# Patient Record
Sex: MALE | Race: WHITE | Employment: FULL TIME | URBAN - METROPOLITAN AREA
[De-identification: names, ages, dates, MRNs, and addresses within clinical notes are randomized per-mention and may not be internally consistent; named-entity substitution may affect disease eponyms.]

---

## 2017-11-23 ENCOUNTER — APPOINTMENT (OUTPATIENT)
Dept: GENERAL RADIOLOGY | Age: 67
End: 2017-11-23
Payer: COMMERCIAL

## 2017-11-23 ENCOUNTER — HOSPITAL ENCOUNTER (EMERGENCY)
Age: 67
Discharge: HOME | End: 2017-11-23
Attending: EMERGENCY MEDICINE
Payer: COMMERCIAL

## 2017-11-23 ENCOUNTER — APPOINTMENT (OUTPATIENT)
Dept: CT IMAGING | Age: 67
End: 2017-11-23
Payer: COMMERCIAL

## 2017-11-23 VITALS
RESPIRATION RATE: 18 BRPM | OXYGEN SATURATION: 98 % | SYSTOLIC BLOOD PRESSURE: 117 MMHG | WEIGHT: 175 LBS | BODY MASS INDEX: 25.05 KG/M2 | DIASTOLIC BLOOD PRESSURE: 91 MMHG | HEIGHT: 70 IN | TEMPERATURE: 99.6 F | HEART RATE: 77 BPM

## 2017-11-23 DIAGNOSIS — I26.99 PULMONARY INFARCT (HCC): Primary | ICD-10-CM

## 2017-11-23 LAB
ALBUMIN SERPL-MCNC: 2.9 G/DL (ref 3.9–4.9)
ALP BLD-CCNC: 71 U/L (ref 35–104)
ALT SERPL-CCNC: 63 U/L (ref 0–41)
ANION GAP SERPL CALCULATED.3IONS-SCNC: 17 MEQ/L (ref 7–13)
APTT: 29.2 SEC (ref 21.6–35.4)
AST SERPL-CCNC: 40 U/L (ref 0–40)
BASOPHILS ABSOLUTE: 0.1 K/UL (ref 0–0.2)
BASOPHILS RELATIVE PERCENT: 1.4 %
BILIRUB SERPL-MCNC: 0.3 MG/DL (ref 0–1.2)
BILIRUBIN URINE: NEGATIVE
BLOOD, URINE: ABNORMAL
BUN BLDV-MCNC: 21 MG/DL (ref 8–23)
CALCIUM SERPL-MCNC: 8.9 MG/DL (ref 8.6–10.2)
CHLORIDE BLD-SCNC: 94 MEQ/L (ref 98–107)
CLARITY: ABNORMAL
CO2: 20 MEQ/L (ref 22–29)
COLOR: YELLOW
CREAT SERPL-MCNC: 1 MG/DL (ref 0.7–1.2)
EOSINOPHILS ABSOLUTE: 0.1 K/UL (ref 0–0.7)
EOSINOPHILS RELATIVE PERCENT: 1.4 %
GFR AFRICAN AMERICAN: >60
GFR NON-AFRICAN AMERICAN: >60
GLOBULIN: 3.8 G/DL (ref 2.3–3.5)
GLUCOSE BLD-MCNC: 123 MG/DL (ref 74–109)
GLUCOSE URINE: NEGATIVE MG/DL
HCT VFR BLD CALC: 33.1 % (ref 42–52)
HEMOGLOBIN: 11.2 G/DL (ref 14–18)
INR BLD: 1.2
KETONES, URINE: NEGATIVE MG/DL
LEUKOCYTE ESTERASE, URINE: NEGATIVE
LYMPHOCYTES ABSOLUTE: 1.8 K/UL (ref 1–4.8)
LYMPHOCYTES RELATIVE PERCENT: 25.2 %
MCH RBC QN AUTO: 31 PG (ref 27–31.3)
MCHC RBC AUTO-ENTMCNC: 33.9 % (ref 33–37)
MCV RBC AUTO: 91.4 FL (ref 80–100)
MONOCYTES ABSOLUTE: 0.8 K/UL (ref 0.2–0.8)
MONOCYTES RELATIVE PERCENT: 11.6 %
MUCUS: PRESENT
NEUTROPHILS ABSOLUTE: 4.3 K/UL (ref 1.4–6.5)
NEUTROPHILS RELATIVE PERCENT: 60.4 %
NITRITE, URINE: NEGATIVE
PDW BLD-RTO: 16.6 % (ref 11.5–14.5)
PH UA: 6 (ref 5–9)
PLATELET # BLD: 240 K/UL (ref 130–400)
POTASSIUM SERPL-SCNC: 4.5 MEQ/L (ref 3.5–5.1)
PROTEIN UA: 100 MG/DL
PROTHROMBIN TIME: 12.8 SEC (ref 8.1–13.7)
RBC # BLD: 3.62 M/UL (ref 4.7–6.1)
RBC UA: ABNORMAL /HPF (ref 0–2)
SODIUM BLD-SCNC: 131 MEQ/L (ref 132–144)
SPECIFIC GRAVITY UA: 1.02 (ref 1–1.03)
TOTAL PROTEIN: 6.7 G/DL (ref 6.4–8.1)
TROPONIN: <0.01 NG/ML (ref 0–0.01)
URINE REFLEX TO CULTURE: YES
UROBILINOGEN, URINE: 0.2 E.U./DL
WBC # BLD: 7.1 K/UL (ref 4.8–10.8)
WBC UA: ABNORMAL /HPF (ref 0–5)

## 2017-11-23 PROCEDURE — 80053 COMPREHEN METABOLIC PANEL: CPT

## 2017-11-23 PROCEDURE — 96361 HYDRATE IV INFUSION ADD-ON: CPT

## 2017-11-23 PROCEDURE — 71020 XR CHEST STANDARD TWO VW: CPT

## 2017-11-23 PROCEDURE — 96375 TX/PRO/DX INJ NEW DRUG ADDON: CPT

## 2017-11-23 PROCEDURE — 74176 CT ABD & PELVIS W/O CONTRAST: CPT

## 2017-11-23 PROCEDURE — 84484 ASSAY OF TROPONIN QUANT: CPT

## 2017-11-23 PROCEDURE — 87086 URINE CULTURE/COLONY COUNT: CPT

## 2017-11-23 PROCEDURE — 85610 PROTHROMBIN TIME: CPT

## 2017-11-23 PROCEDURE — 6360000002 HC RX W HCPCS: Performed by: EMERGENCY MEDICINE

## 2017-11-23 PROCEDURE — 2580000003 HC RX 258: Performed by: EMERGENCY MEDICINE

## 2017-11-23 PROCEDURE — 85025 COMPLETE CBC W/AUTO DIFF WBC: CPT

## 2017-11-23 PROCEDURE — 93005 ELECTROCARDIOGRAM TRACING: CPT

## 2017-11-23 PROCEDURE — 36415 COLL VENOUS BLD VENIPUNCTURE: CPT

## 2017-11-23 PROCEDURE — 96374 THER/PROPH/DIAG INJ IV PUSH: CPT

## 2017-11-23 PROCEDURE — 85730 THROMBOPLASTIN TIME PARTIAL: CPT

## 2017-11-23 PROCEDURE — 81001 URINALYSIS AUTO W/SCOPE: CPT

## 2017-11-23 PROCEDURE — 99284 EMERGENCY DEPT VISIT MOD MDM: CPT

## 2017-11-23 PROCEDURE — 87040 BLOOD CULTURE FOR BACTERIA: CPT

## 2017-11-23 RX ORDER — ASCORBIC ACID 500 MG
500 TABLET ORAL DAILY
Status: ON HOLD | COMMUNITY
End: 2018-11-09

## 2017-11-23 RX ORDER — LORAZEPAM 2 MG/ML
2 INJECTION INTRAMUSCULAR ONCE
Status: COMPLETED | OUTPATIENT
Start: 2017-11-23 | End: 2017-11-23

## 2017-11-23 RX ORDER — LEVOFLOXACIN 500 MG/1
500 TABLET, FILM COATED ORAL DAILY
Status: ON HOLD | COMMUNITY
End: 2018-11-09 | Stop reason: HOSPADM

## 2017-11-23 RX ORDER — KETOROLAC TROMETHAMINE 30 MG/ML
30 INJECTION, SOLUTION INTRAMUSCULAR; INTRAVENOUS ONCE
Status: COMPLETED | OUTPATIENT
Start: 2017-11-23 | End: 2017-11-23

## 2017-11-23 RX ORDER — PREDNISONE 1 MG/1
5 TABLET ORAL DAILY
COMMUNITY

## 2017-11-23 RX ORDER — OXYCODONE HYDROCHLORIDE AND ACETAMINOPHEN 5; 325 MG/1; MG/1
1-2 TABLET ORAL EVERY 6 HOURS PRN
Qty: 20 TABLET | Refills: 0 | Status: SHIPPED | OUTPATIENT
Start: 2017-11-23 | End: 2017-11-30

## 2017-11-23 RX ORDER — LANOLIN ALCOHOL/MO/W.PET/CERES
1000 CREAM (GRAM) TOPICAL DAILY
Status: ON HOLD | COMMUNITY
End: 2018-11-09

## 2017-11-23 RX ORDER — 0.9 % SODIUM CHLORIDE 0.9 %
500 INTRAVENOUS SOLUTION INTRAVENOUS ONCE
Status: COMPLETED | OUTPATIENT
Start: 2017-11-23 | End: 2017-11-23

## 2017-11-23 RX ORDER — AZITHROMYCIN 250 MG/1
TABLET, FILM COATED ORAL
Qty: 1 PACKET | Refills: 0 | Status: ON HOLD | OUTPATIENT
Start: 2017-11-23 | End: 2018-11-09

## 2017-11-23 RX ORDER — ALENDRONATE SODIUM 70 MG/1
70 TABLET ORAL
Status: ON HOLD | COMMUNITY
End: 2019-01-11

## 2017-11-23 RX ORDER — ONDANSETRON 2 MG/ML
4 INJECTION INTRAMUSCULAR; INTRAVENOUS ONCE
Status: COMPLETED | OUTPATIENT
Start: 2017-11-23 | End: 2017-11-23

## 2017-11-23 RX ADMIN — SODIUM CHLORIDE 500 ML: 9 INJECTION, SOLUTION INTRAVENOUS at 17:50

## 2017-11-23 RX ADMIN — KETOROLAC TROMETHAMINE 30 MG: 30 INJECTION, SOLUTION INTRAMUSCULAR at 17:51

## 2017-11-23 RX ADMIN — CEFTRIAXONE 1 G: 1 INJECTION, POWDER, FOR SOLUTION INTRAMUSCULAR; INTRAVENOUS at 20:43

## 2017-11-23 RX ADMIN — HYDROMORPHONE HYDROCHLORIDE 0.5 MG: 1 INJECTION, SOLUTION INTRAMUSCULAR; INTRAVENOUS; SUBCUTANEOUS at 20:22

## 2017-11-23 RX ADMIN — ONDANSETRON 4 MG: 2 INJECTION INTRAMUSCULAR; INTRAVENOUS at 20:22

## 2017-11-23 RX ADMIN — LORAZEPAM 2 MG: 2 INJECTION INTRAMUSCULAR at 17:51

## 2017-11-23 ASSESSMENT — ENCOUNTER SYMPTOMS
RHINORRHEA: 0
PHOTOPHOBIA: 0
CONSTIPATION: 0
VOMITING: 0
WHEEZING: 0
CHOKING: 0
ANAL BLEEDING: 0
ABDOMINAL PAIN: 0
SORE THROAT: 0
DIARRHEA: 0
ABDOMINAL DISTENTION: 0
EYE ITCHING: 0
CHEST TIGHTNESS: 0
COLOR CHANGE: 0
TROUBLE SWALLOWING: 0
VOICE CHANGE: 0
BACK PAIN: 0
SINUS PRESSURE: 0
EYE PAIN: 0
EYE REDNESS: 0
EYE DISCHARGE: 0
BLOOD IN STOOL: 0
COUGH: 0
NAUSEA: 0
SHORTNESS OF BREATH: 1
FACIAL SWELLING: 0
STRIDOR: 0

## 2017-11-23 ASSESSMENT — PAIN DESCRIPTION - PAIN TYPE: TYPE: ACUTE PAIN

## 2017-11-23 ASSESSMENT — PAIN SCALES - GENERAL
PAINLEVEL_OUTOF10: 7
PAINLEVEL_OUTOF10: 7
PAINLEVEL_OUTOF10: 4
PAINLEVEL_OUTOF10: 3

## 2017-11-23 ASSESSMENT — PAIN DESCRIPTION - FREQUENCY: FREQUENCY: CONTINUOUS

## 2017-11-23 ASSESSMENT — PAIN DESCRIPTION - ORIENTATION: ORIENTATION: RIGHT

## 2017-11-23 ASSESSMENT — PAIN DESCRIPTION - DESCRIPTORS: DESCRIPTORS: SHARP

## 2017-11-23 ASSESSMENT — PAIN DESCRIPTION - PROGRESSION: CLINICAL_PROGRESSION: GRADUALLY WORSENING

## 2017-11-23 ASSESSMENT — PAIN DESCRIPTION - ONSET: ONSET: SUDDEN

## 2017-11-23 ASSESSMENT — PAIN DESCRIPTION - LOCATION: LOCATION: BACK

## 2017-11-23 NOTE — ED TRIAGE NOTES
Pt to ER with c/o pain in back 7/10, pt states he was in [facility] and was released on Monday and was diagnosed with 2 PE's and an unknown infection, pt states that the pain is getting worse, pt is a&ox3, resp even and deep, pt gasping and grimacing when he breathes

## 2017-11-23 NOTE — ED PROVIDER NOTES
[address] [facility] ED  eMERGENCY dEPARTMENT eNCOUnter      Pt Name: Alexandrea Granados  MRN: [id number]  [place] 1950  Date of evaluation: 11/23/2017  Provider: Lior Lara MD    CHIEF COMPLAINT       Chief Complaint   Patient presents with    Back Pain     diagnosed with 2 PE's on monday, is on eliquis says pain and sob getting worse today         HISTORY OF PRESENT ILLNESS   (Location/Symptom, Timing/Onset, Context/Setting, Quality, Duration, Modifying Factors, Severity)  Note limiting factors. Alexandrea Granados is a 74 y.o. male who presents to the emergency department With right flank and posterior back pain. He states this started a week ago he went to the [facility] clinic was diagnosed with a pulmonary embolism. He was admitted for 3 days, discharged and felt relatively well until 5 or 6 hours ago when the pain returned. He describes the pain as sharp and unrelenting it is not spasmodic in nature and does not radiate he points to an area in his right flank posteriorly between the ninth and 12th ribs. HPI    Nursing Notes were reviewed. REVIEW OF SYSTEMS    (2-9 systems for level 4, 10 or more for level 5)     Review of Systems   Constitutional: Negative for appetite change, chills, diaphoresis, fatigue and fever. HENT: Negative for congestion, dental problem, ear discharge, ear pain, facial swelling, hearing loss, mouth sores, nosebleeds, postnasal drip, rhinorrhea, sinus pressure, sneezing, sore throat, tinnitus, trouble swallowing and voice change. Eyes: Negative for photophobia, pain, discharge, redness, itching and visual disturbance. Respiratory: Positive for shortness of breath. Negative for cough, choking, chest tightness, wheezing and stridor. Cardiovascular: Negative for chest pain, palpitations and leg swelling. Gastrointestinal: Negative for abdominal distention, abdominal pain, anal bleeding, blood in stool, constipation, diarrhea, nausea and vomiting. Endocrine: Negative for cold intolerance, heat intolerance, polydipsia and polyphagia. Genitourinary: Positive for flank pain. Negative for decreased urine volume, difficulty urinating, dysuria, enuresis, frequency, genital sores, hematuria and urgency. Musculoskeletal: Negative for arthralgias, back pain, gait problem, joint swelling, myalgias, neck pain and neck stiffness. Skin: Negative for color change, pallor, rash and wound. Allergic/Immunologic: Negative for environmental allergies and food allergies. Neurological: Negative for dizziness, tremors, seizures, syncope, facial asymmetry, speech difficulty, weakness, light-headedness, numbness and headaches. Hematological: Negative for adenopathy. Does not bruise/bleed easily. Psychiatric/Behavioral: Negative for agitation, behavioral problems, confusion, decreased concentration, dysphoric mood, hallucinations, self-injury, sleep disturbance and suicidal ideas. The patient is not nervous/anxious and is not hyperactive. Except as noted above the remainder of the review of systems was reviewed and negative.        PAST MEDICAL HISTORY     Past Medical History:   Diagnosis Date    Depression 2007    Hypertension     RA (rheumatoid arthritis) ([facility], [place] [address]     [facility] Dr. Harsha Logan arthritis(714.0)          SURGICAL HISTORY       Past Surgical History:   Procedure Laterality Date    FRACTURE SURGERY  2004    OPEN REDUCTION LEFT LEG         CURRENT MEDICATIONS       Discharge Medication List as of 11/23/2017  8:36 PM      CONTINUE these medications which have NOT CHANGED    Details   apixaban (ELIQUIS) 5 MG TABS tablet Take 10 mg by mouth 2 times dailyHistorical Med      LACTOBACILLUS RHAMNOSUS, GG, PO Take 1 capsule by mouth dailyHistorical Med      levofloxacin (LEVAQUIN) 500 MG tablet Take 500 mg by mouth dailyHistorical Med      metoprolol tartrate (LOPRESSOR) 25 MG tablet Take 12.5 mg by mouth 2 times dailyHistorical Med      alendronate well-nourished. No distress. HENT:   Head: Normocephalic and atraumatic. Right Ear: External ear normal.   Left Ear: External ear normal.   Nose: Nose normal.   Mouth/Throat: Oropharynx is clear and moist. No oropharyngeal exudate. Eyes: Conjunctivae and EOM are normal. Pupils are equal, round, and reactive to light. Right eye exhibits no discharge. Left eye exhibits no discharge. No scleral icterus. Neck: Normal range of motion. Neck supple. No JVD present. No tracheal deviation present. No thyromegaly present. Cardiovascular: Normal rate, regular rhythm, normal heart sounds and intact distal pulses. Exam reveals no gallop and no friction rub. No murmur heard. Pulmonary/Chest: Effort normal and breath sounds normal. No stridor. No respiratory distress. He has no wheezes. He has no rales. He exhibits no tenderness. Abdominal: Soft. Bowel sounds are normal. He exhibits no distension and no mass. There is no tenderness. There is no rebound and no guarding. Genitourinary:   Genitourinary Comments: Tender right flank to palpation and percussion   Musculoskeletal: Normal range of motion. He exhibits no edema or tenderness. Lymphadenopathy:     He has no cervical adenopathy. Neurological: He is alert and oriented to person, place, and time. He has normal reflexes. No cranial nerve deficit. He exhibits normal muscle tone. Coordination normal.   Skin: Skin is warm and dry. No rash noted. He is not diaphoretic. No erythema. No pallor. Psychiatric: He has a normal mood and affect. His behavior is normal. Judgment and thought content normal.   Nursing note and vitals reviewed. DIAGNOSTIC RESULTS     EKG: All EKG's are interpreted by the Emergency Department Physician who either signs or Co-signs this chart in the absence of a cardiologist.    A 12-lead EKG was performed which shows sinus tachycardia with a rate of 109 bpm there is a right axis deviation.   No ST segment elevation or depression in any limb later precordial lead. There is no ectopy. Summary: Borderline EKG without acute findings of a STEMI MI    RADIOLOGY:   Non-plain film images such as CT, Ultrasound and MRI are read by the radiologist. Plain radiographic images are visualized and preliminarily interpreted by the emergency physician with the below findings:    CT scan of the abdomen was ordered    Interpretation per the Radiologist below, if available at the time of this note:    RADIOLOGY REPORT   Final Result      XR CHEST STANDARD (2 VW)   Final Result      MILD INFILTRATES OF THE LOWER LOBES, LIKELY INFARCTS RELATED TO THE PATIENT'S KNOWN PULMONARY EMBOLI. CT ABDOMEN PELVIS WO IV CONTRAST Additional Contrast? None   Final Result   1. NORMAL SIZE KIDNEYS WITHOUT NEPHROLITHIASIS OR HYDRONEPHROSIS. 2. THERE IS NO UROLITHIASIS OR CT EVIDENCE OF OBSTRUCTIVE UROPATHY. 3. APPENDIX NOT CLEARLY VISUALIZED HOWEVER, NO CT FINDINGS SUGGESTING APPENDICITIS. 4. MILD COLONIC DIVERTICULOSIS WITHOUT CT EVIDENCE OF DIVERTICULITIS. 5. NO MASS, \"FREE FLUID\", ABSCESS OR PNEUMOPERITONEUM IN THE ABDOMEN. 6. BIBASILAR PULMONARY INFILTRATES OR PNEUMONIA WITH LARGEST PULMONARY INFILTRATE IN RLL. All CT scans at this facility use dose modulation, iterative reconstruction, and/or weight based dosing when appropriate to reduce radiation dose to as low as reasonably achievable.             ED BEDSIDE ULTRASOUND:   Performed by ED Physician - none    LABS:  Labs Reviewed   CBC WITH AUTO DIFFERENTIAL - Abnormal; Notable for the following:        Result Value    RBC 3.62 (*)     Hemoglobin 11.2 (*)     Hematocrit 33.1 (*)     RDW 16.6 (*)     All other components within normal limits   COMPREHENSIVE METABOLIC PANEL - Abnormal; Notable for the following:     Sodium 131 (*)     Chloride 94 (*)     CO2 20 (*)     Anion Gap 17 (*)     Glucose 123 (*)     Alb 2.9 (*)     ALT 63 (*)     Globulin 3.8 (*)     All other components within normal limits   URINE RT REFLEX TO CULTURE - Abnormal; Notable for the following:     Clarity, UA CLOUDY (*)     Blood, Urine MODERATE (*)     Protein,  (*)     All other components within normal limits   MICROSCOPIC URINALYSIS - Abnormal; Notable for the following:     RBC, UA 3-5 (*)     All other components within normal limits   CULTURE BLOOD #1    Narrative:     ORDER#: [id number]                          ORDERED BY: DAVIS CORLEY  SOURCE: Blood                              COLLECTED:  11/23/17 21:12  ANTIBIOTICS AT TRISH.:                      RECEIVED :  11/23/17 21:12   URINE CULTURE    Narrative:     ORDER#: [id number]                          ORDERED BY: ARSENIO BAIRD  SOURCE: Urine Clean Catch                  COLLECTED:  11/23/17 18:00  ANTIBIOTICS AT TRISH.:                      RECEIVED :  11/23/17 20:15   TROPONIN   PROTIME-INR   APTT       All other labs were within normal range or not returned as of this dictation. EMERGENCY DEPARTMENT COURSE and DIFFERENTIAL DIAGNOSIS/MDM:   Vitals:    Vitals:    11/23/17 1740 11/23/17 1810 11/23/17 1905 11/23/17 2026   BP:  129/79 115/78 (!) 117/91   Pulse:  94 86 77   Resp: 22 20 18 18   Temp:       TempSrc:       SpO2:  96% 94% 98%   Weight:       Height:           The patient will be DISCHARGED. MDM      CRITICAL CARE TIME     CONSULTS:  None    PROCEDURES:  Unless otherwise noted below, none     Procedures    FINAL IMPRESSION      1. Pulmonary infarct [facility])          DISPOSITION/PLAN   DISPOSITION Decision to Discharge    PATIENT REFERRED TO:  return or primary doctor 2-4 days            DISCHARGE MEDICATIONS:  Discharge Medication List as of 11/23/2017  8:36 PM      START taking these medications    Details   oxyCODONE-acetaminophen (PERCOCET) 5-325 MG per tablet Take 1-2 tablets by mouth every 6 hours as needed for Pain  WARNING:  May cause drowsiness. May impair ability to operate vehicles or machinery. Do not use in combination with alcohol. ., Disp-20 tablet, R-0Print      azithromycin (ZITHROMAX) 250 MG tablet Take 2 tablets (500 mg) on Day 1, followed by 1 tablet (250 mg) once daily on Days 2 through 5., Disp-1 packet, R-0Print                (Please note that portions of this note were completed with a voice recognition program.  Efforts were made to edit the dictations but occasionally words are mis-transcribed.)    Elian Gleason MD (electronically signed)  Attending Emergency Physician            Elian Gleason MD  12/09/17 5298

## 2017-11-24 NOTE — ED PROVIDER NOTES
[address] [facility] ED  eMERGENCY dEPARTMENT eNCOUnter      Pt Name: Ricky Powell  MRN: [id number]  [place] 1950  Date of evaluation: 11/23/2017  Provider: Charly Torre MD    CHIEF COMPLAINT       Chief Complaint   Patient presents with    Back Pain     diagnosed with 2 PE's on monday, is on eliquis says pain and sob getting worse today         HISTORY OF PRESENT ILLNESS   (Location/Symptom, Timing/Onset, Context/Setting, Quality, Duration, Modifying Factors, Severity)  Note limiting factors. Ricky Powell is a 74 y.o. male who presents to the emergency department Who was first seen by Dr. Tori Schwartz, please see note by him. He was diagnosed with 2 pulmonary embolisms at the clinic clinic and sent home on [facility].  He had pain in the right-sided flank pain at that point, was not diagnosed with kidney stones. There was shortness of breath at that time which has resolved. Tonight, he got suddenly worse in the right flank region. Again no history dysuria frequency or fever. Shortness of breath and anterior chest pain are not present. Productive cough and hemoptysis or not present. Pain is somewhat better now    HPI    Nursing Notes were reviewed. REVIEW OF SYSTEMS    (2-9 systems for level 4, 10 or more for level 5)     Review of Systems  Constitutional symptoms:  no Fatigue, no fever, no chills. Skin symptoms:  Negative except as documented in HPI. ENMT symptoms:  Negative except as documented in HPI. Respiratory symptoms:  Negative except as documented in HPI. Right-sided flank pain as above  Cardiovascular symptoms:  Negative except as documented in HPI. Gastrointestinal symptoms: Negative except for documented as above in the HPI   Genitourinary symptoms:  Negative except as documented in HPI. Musculoskeletal symptoms:  Negative except as documented in HPI. Neurologic symptoms:  Negative except as documented in HPI.    Remainder of 10 systems, all negative except for -Good turgor warm and dry. -Apparent lesions or rashes: No    NEURO: -Patient: alert                -Oriented to: person, place and time. -Appearance and judgment: appropriate.                -Cranial Nerves: Normal.                -Speech: Normal                -Finger-to-Nose testing: Normal                - and plantar flexion: Equal      DIAGNOSTIC RESULTS     EKG: All EKG's are interpreted by the Emergency Department Physician who either signs or Co-signs this chart in the absence of a cardiologist.    EKG was revewed  and revealed normal sinus rhythm, rate is 70-85. no acute ST elevations,no flipped T waves , no Q waves. DC interval is normal.QRS duration is normal. Axes are normal. There are no PVCs    RADIOLOGY:   Non-plain film images such as CT, Ultrasound and MRI are read by the radiologist. Plain radiographic images are visualized and preliminarily interpreted by the emergency physician with the below findings:    Chest x-ray reveals very mild oppose cities in the bases. CT scan of the abdomen pelvis fails to reveal any pathology but does show possible acute pulmonary infarct on top of a pulmonary embolism already present. Unclear that this could also represent an infection patient had been on antibiotics until 2 days ago. He is not having hemoptysis productive cough fever or malaise. He was advised to be admitted to the hospital for observation because pulmonary embolism and superimposed pulmonary infarct can suddenly get worse even cause an effusion. Also sudden death was a possibility mentioned to him and his wife, however he wished to go home enjoy the rest of us thinks having dinner with pain medications and antibiotics she will return for worsening also close family doctor follow-up he will not fail to take his Eliquis.     Interpretation per the Radiologist below, if available at the time of this note:    CT ABDOMEN PELVIS WO IV CONTRAST Additional Contrast? None (Results Pending)   XR CHEST STANDARD (2 VW)    (Results Pending)         ED BEDSIDE ULTRASOUND:   Performed by ED Physician - none    LABS:  Labs Reviewed   CBC WITH AUTO DIFFERENTIAL - Abnormal; Notable for the following:        Result Value    RBC 3.62 (*)     Hemoglobin 11.2 (*)     Hematocrit 33.1 (*)     RDW 16.6 (*)     All other components within normal limits   COMPREHENSIVE METABOLIC PANEL - Abnormal; Notable for the following:     Sodium 131 (*)     Chloride 94 (*)     CO2 20 (*)     Anion Gap 17 (*)     Glucose 123 (*)     Alb 2.9 (*)     ALT 63 (*)     Globulin 3.8 (*)     All other components within normal limits   URINE RT REFLEX TO CULTURE - Abnormal; Notable for the following:     Clarity, UA CLOUDY (*)     Blood, Urine MODERATE (*)     Protein,  (*)     All other components within normal limits   CULTURE BLOOD #1   URINE CULTURE   TROPONIN   PROTIME-INR   APTT   MICROSCOPIC URINALYSIS       All other labs were within normal range or not returned as of this dictation. EMERGENCY DEPARTMENT COURSE and DIFFERENTIAL DIAGNOSIS/MDM:   Vitals:    Vitals:    11/23/17 1702 11/23/17 1740 11/23/17 1810 11/23/17 1905   BP: 133/78  129/79 115/78   Pulse: 123  94 86   Resp:  22 20 18   Temp: 99.6 °F (37.6 °C)      TempSrc: Oral      SpO2: 96%  96% 94%   Weight: 175 lb (79.4 kg)      Height: 5' 10\" (1.778 m)              MDM    CRITICAL CARE TIME   Total Critical Care time was  minutes, excluding separately reportable procedures. There was a high probability of clinically significant/life threatening deterioration in the patient's condition which required my urgent intervention. CONSULTS:  None    PROCEDURES:  Unless otherwise noted below, none     Procedures    FINAL IMPRESSION      1.  Pulmonary infarct [facility])          DISPOSITION/PLAN   DISPOSITION Decision to Discharge    PATIENT REFERRED TO:  return or primary doctor 2-4 days            DISCHARGE MEDICATIONS:  New Prescriptions    AZITHROMYCIN (ZITHROMAX) 250 MG TABLET    Take 2 tablets (500 mg) on Day 1, followed by 1 tablet (250 mg) once daily on Days 2 through 5. OXYCODONE-ACETAMINOPHEN (PERCOCET) 5-325 MG PER TABLET    Take 1-2 tablets by mouth every 6 hours as needed for Pain  WARNING:  May cause drowsiness. May impair ability to operate vehicles or machinery. Do not use in combination with alcohol. .          (Please note that portions of this note were completed with a voice recognition program.  Efforts were made to edit the dictations but occasionally words are mis-transcribed.)    Jeniffer Perez MD (electronically signed)  Attending Emergency Physician          Jeniffer Perez MD  11/23/17 2014

## 2017-11-25 LAB — URINE CULTURE, ROUTINE: NORMAL

## 2017-11-27 LAB
EKG ATRIAL RATE: 109 BPM
EKG P AXIS: 54 DEGREES
EKG P-R INTERVAL: 144 MS
EKG Q-T INTERVAL: 310 MS
EKG QRS DURATION: 82 MS
EKG QTC CALCULATION (BAZETT): 417 MS
EKG R AXIS: 105 DEGREES
EKG T AXIS: 59 DEGREES
EKG VENTRICULAR RATE: 109 BPM

## 2017-11-28 LAB — BLOOD CULTURE, ROUTINE: NORMAL

## 2018-11-09 ENCOUNTER — HOSPITAL ENCOUNTER (INPATIENT)
Age: 68
LOS: 2 days | Discharge: HOME | DRG: 378 | End: 2018-11-11
Attending: INTERNAL MEDICINE | Admitting: INTERNAL MEDICINE
Payer: COMMERCIAL

## 2018-11-09 ENCOUNTER — APPOINTMENT (OUTPATIENT)
Dept: GENERAL RADIOLOGY | Age: 68
DRG: 378 | End: 2018-11-09
Payer: COMMERCIAL

## 2018-11-09 DIAGNOSIS — R77.8 ELEVATED TROPONIN: ICD-10-CM

## 2018-11-09 DIAGNOSIS — K92.2 GASTROINTESTINAL HEMORRHAGE, UNSPECIFIED GASTROINTESTINAL HEMORRHAGE TYPE: Primary | ICD-10-CM

## 2018-11-09 PROBLEM — D62 ACUTE BLOOD LOSS ANEMIA: Status: ACTIVE | Noted: 2018-11-09

## 2018-11-09 LAB
ABO/RH: NORMAL
ALBUMIN SERPL-MCNC: 3.4 G/DL (ref 3.9–4.9)
ALP BLD-CCNC: 38 U/L (ref 35–104)
ALT SERPL-CCNC: 27 U/L (ref 0–41)
ANION GAP SERPL CALCULATED.3IONS-SCNC: 10 MEQ/L (ref 7–13)
ANTIBODY SCREEN: NORMAL
APTT: 21.9 SEC (ref 21.6–35.4)
AST SERPL-CCNC: 30 U/L (ref 0–40)
BASOPHILS ABSOLUTE: 0 K/UL (ref 0–0.2)
BASOPHILS RELATIVE PERCENT: 0.4 %
BILIRUB SERPL-MCNC: 0.5 MG/DL (ref 0–1.2)
BLOOD BANK DISPENSE STATUS: NORMAL
BLOOD BANK DISPENSE STATUS: NORMAL
BLOOD BANK PRODUCT CODE: NORMAL
BLOOD BANK PRODUCT CODE: NORMAL
BPU ID: NORMAL
BPU ID: NORMAL
BUN BLDV-MCNC: 28 MG/DL (ref 8–23)
CALCIUM SERPL-MCNC: 8.9 MG/DL (ref 8.6–10.2)
CHLORIDE BLD-SCNC: 103 MEQ/L (ref 98–107)
CO2: 25 MEQ/L (ref 22–29)
CREAT SERPL-MCNC: 0.77 MG/DL (ref 0.7–1.2)
DESCRIPTION BLOOD BANK: NORMAL
DESCRIPTION BLOOD BANK: NORMAL
EOSINOPHILS ABSOLUTE: 0.1 K/UL (ref 0–0.7)
EOSINOPHILS RELATIVE PERCENT: 0.6 %
FERRITIN: 87.3 NG/ML (ref 30–400)
GFR AFRICAN AMERICAN: >60
GFR NON-AFRICAN AMERICAN: >60
GLOBULIN: 2 G/DL (ref 2.3–3.5)
GLUCOSE BLD-MCNC: 94 MG/DL (ref 74–109)
HCT VFR BLD CALC: 18.9 % (ref 42–52)
HCT VFR BLD CALC: 22.9 % (ref 42–52)
HEMOGLOBIN: 6.4 G/DL (ref 14–18)
HEMOGLOBIN: 7.8 G/DL (ref 14–18)
INR BLD: 1.2
IRON SATURATION: 25 % (ref 11–46)
IRON: 66 UG/DL (ref 59–158)
LYMPHOCYTES ABSOLUTE: 1 K/UL (ref 1–4.8)
LYMPHOCYTES RELATIVE PERCENT: 8 %
MAGNESIUM: 1.8 MG/DL (ref 1.7–2.3)
MCH RBC QN AUTO: 33.6 PG (ref 27–31.3)
MCHC RBC AUTO-ENTMCNC: 33.8 % (ref 33–37)
MCV RBC AUTO: 99.2 FL (ref 80–100)
MONOCYTES ABSOLUTE: 0.8 K/UL (ref 0.2–0.8)
MONOCYTES RELATIVE PERCENT: 7 %
NEUTROPHILS ABSOLUTE: 10.2 K/UL (ref 1.4–6.5)
NEUTROPHILS RELATIVE PERCENT: 84 %
PDW BLD-RTO: 16.8 % (ref 11.5–14.5)
PLATELET # BLD: 263 K/UL (ref 130–400)
POTASSIUM SERPL-SCNC: 5 MEQ/L (ref 3.5–5.1)
PRO-BNP: 747 PG/ML
PROTHROMBIN TIME: 12.1 SEC (ref 9.6–12.3)
RBC # BLD: 1.91 M/UL (ref 4.7–6.1)
SODIUM BLD-SCNC: 138 MEQ/L (ref 132–144)
TOTAL CK: 93 U/L (ref 0–190)
TOTAL IRON BINDING CAPACITY: 268 UG/DL (ref 178–450)
TOTAL PROTEIN: 5.4 G/DL (ref 6.4–8.1)
TROPONIN: 0.01 NG/ML (ref 0–0.01)
TROPONIN: <0.01 NG/ML (ref 0–0.01)
TROPONIN: <0.01 NG/ML (ref 0–0.01)
WBC # BLD: 12.2 K/UL (ref 4.8–10.8)

## 2018-11-09 PROCEDURE — 82728 ASSAY OF FERRITIN: CPT

## 2018-11-09 PROCEDURE — 71045 X-RAY EXAM CHEST 1 VIEW: CPT

## 2018-11-09 PROCEDURE — 2580000003 HC RX 258: Performed by: NURSE PRACTITIONER

## 2018-11-09 PROCEDURE — APPNB45 APP NON BILLABLE 31-45 MINUTES: Performed by: NURSE PRACTITIONER

## 2018-11-09 PROCEDURE — 82550 ASSAY OF CK (CPK): CPT

## 2018-11-09 PROCEDURE — C9113 INJ PANTOPRAZOLE SODIUM, VIA: HCPCS | Performed by: PHYSICIAN ASSISTANT

## 2018-11-09 PROCEDURE — 85730 THROMBOPLASTIN TIME PARTIAL: CPT

## 2018-11-09 PROCEDURE — 83735 ASSAY OF MAGNESIUM: CPT

## 2018-11-09 PROCEDURE — 36415 COLL VENOUS BLD VENIPUNCTURE: CPT

## 2018-11-09 PROCEDURE — P9016 RBC LEUKOCYTES REDUCED: HCPCS

## 2018-11-09 PROCEDURE — 85018 HEMOGLOBIN: CPT

## 2018-11-09 PROCEDURE — 83540 ASSAY OF IRON: CPT

## 2018-11-09 PROCEDURE — 85610 PROTHROMBIN TIME: CPT

## 2018-11-09 PROCEDURE — 86923 COMPATIBILITY TEST ELECTRIC: CPT

## 2018-11-09 PROCEDURE — 2060000000 HC ICU INTERMEDIATE R&B

## 2018-11-09 PROCEDURE — C9113 INJ PANTOPRAZOLE SODIUM, VIA: HCPCS | Performed by: NURSE PRACTITIONER

## 2018-11-09 PROCEDURE — 99285 EMERGENCY DEPT VISIT HI MDM: CPT

## 2018-11-09 PROCEDURE — 84484 ASSAY OF TROPONIN QUANT: CPT

## 2018-11-09 PROCEDURE — 83550 IRON BINDING TEST: CPT

## 2018-11-09 PROCEDURE — 86850 RBC ANTIBODY SCREEN: CPT

## 2018-11-09 PROCEDURE — 80053 COMPREHEN METABOLIC PANEL: CPT

## 2018-11-09 PROCEDURE — 86900 BLOOD TYPING SEROLOGIC ABO: CPT

## 2018-11-09 PROCEDURE — 83880 ASSAY OF NATRIURETIC PEPTIDE: CPT

## 2018-11-09 PROCEDURE — 2580000003 HC RX 258: Performed by: PHYSICIAN ASSISTANT

## 2018-11-09 PROCEDURE — 6360000002 HC RX W HCPCS: Performed by: PHYSICIAN ASSISTANT

## 2018-11-09 PROCEDURE — 86901 BLOOD TYPING SEROLOGIC RH(D): CPT

## 2018-11-09 PROCEDURE — 99222 1ST HOSP IP/OBS MODERATE 55: CPT | Performed by: INTERNAL MEDICINE

## 2018-11-09 PROCEDURE — 93010 ELECTROCARDIOGRAM REPORT: CPT | Performed by: INTERNAL MEDICINE

## 2018-11-09 PROCEDURE — 6370000000 HC RX 637 (ALT 250 FOR IP): Performed by: NURSE PRACTITIONER

## 2018-11-09 PROCEDURE — 85014 HEMATOCRIT: CPT

## 2018-11-09 PROCEDURE — 6370000000 HC RX 637 (ALT 250 FOR IP): Performed by: INTERNAL MEDICINE

## 2018-11-09 PROCEDURE — 85025 COMPLETE CBC W/AUTO DIFF WBC: CPT

## 2018-11-09 PROCEDURE — 93005 ELECTROCARDIOGRAM TRACING: CPT

## 2018-11-09 PROCEDURE — 6360000002 HC RX W HCPCS: Performed by: NURSE PRACTITIONER

## 2018-11-09 PROCEDURE — 36430 TRANSFUSION BLD/BLD COMPNT: CPT

## 2018-11-09 RX ORDER — FOLIC ACID 1 MG/1
1 TABLET ORAL DAILY
COMMUNITY

## 2018-11-09 RX ORDER — ONDANSETRON 2 MG/ML
4 INJECTION INTRAMUSCULAR; INTRAVENOUS EVERY 6 HOURS PRN
Status: DISCONTINUED | OUTPATIENT
Start: 2018-11-09 | End: 2018-11-11 | Stop reason: HOSPADM

## 2018-11-09 RX ORDER — 0.9 % SODIUM CHLORIDE 0.9 %
10 VIAL (ML) INJECTION EVERY 12 HOURS
Status: DISCONTINUED | OUTPATIENT
Start: 2018-11-09 | End: 2018-11-11 | Stop reason: HOSPADM

## 2018-11-09 RX ORDER — LEFLUNOMIDE 10 MG/1
20 TABLET ORAL DAILY
Status: DISCONTINUED | OUTPATIENT
Start: 2018-11-09 | End: 2018-11-11 | Stop reason: HOSPADM

## 2018-11-09 RX ORDER — 0.9 % SODIUM CHLORIDE 0.9 %
10 VIAL (ML) INJECTION ONCE
Status: DISCONTINUED | OUTPATIENT
Start: 2018-11-09 | End: 2018-11-09

## 2018-11-09 RX ORDER — ATORVASTATIN CALCIUM 80 MG/1
80 TABLET, FILM COATED ORAL NIGHTLY
Status: DISCONTINUED | OUTPATIENT
Start: 2018-11-09 | End: 2018-11-11 | Stop reason: HOSPADM

## 2018-11-09 RX ORDER — METOPROLOL TARTRATE 50 MG/1
50 TABLET, FILM COATED ORAL 2 TIMES DAILY
Status: ON HOLD | COMMUNITY
End: 2018-11-11 | Stop reason: HOSPADM

## 2018-11-09 RX ORDER — SODIUM CHLORIDE 0.9 % (FLUSH) 0.9 %
10 SYRINGE (ML) INJECTION PRN
Status: DISCONTINUED | OUTPATIENT
Start: 2018-11-09 | End: 2018-11-11 | Stop reason: HOSPADM

## 2018-11-09 RX ORDER — PANTOPRAZOLE SODIUM 40 MG/10ML
40 INJECTION, POWDER, LYOPHILIZED, FOR SOLUTION INTRAVENOUS ONCE
Status: COMPLETED | OUTPATIENT
Start: 2018-11-09 | End: 2018-11-09

## 2018-11-09 RX ORDER — SODIUM CHLORIDE 0.9 % (FLUSH) 0.9 %
10 SYRINGE (ML) INJECTION EVERY 12 HOURS SCHEDULED
Status: DISCONTINUED | OUTPATIENT
Start: 2018-11-09 | End: 2018-11-09

## 2018-11-09 RX ORDER — PANTOPRAZOLE SODIUM 40 MG/10ML
40 INJECTION, POWDER, LYOPHILIZED, FOR SOLUTION INTRAVENOUS EVERY 12 HOURS
Status: DISCONTINUED | OUTPATIENT
Start: 2018-11-09 | End: 2018-11-11 | Stop reason: HOSPADM

## 2018-11-09 RX ORDER — ACETAMINOPHEN 325 MG/1
650 TABLET ORAL EVERY 4 HOURS PRN
Status: DISCONTINUED | OUTPATIENT
Start: 2018-11-09 | End: 2018-11-11 | Stop reason: HOSPADM

## 2018-11-09 RX ORDER — LISINOPRIL 10 MG/1
10 TABLET ORAL DAILY
COMMUNITY

## 2018-11-09 RX ORDER — CLOPIDOGREL BISULFATE 75 MG/1
75 TABLET ORAL DAILY
Status: DISCONTINUED | OUTPATIENT
Start: 2018-11-10 | End: 2018-11-11 | Stop reason: HOSPADM

## 2018-11-09 RX ORDER — ESOMEPRAZOLE MAGNESIUM 40 MG/1
40 FOR SUSPENSION ORAL DAILY
COMMUNITY

## 2018-11-09 RX ORDER — ATORVASTATIN CALCIUM 80 MG/1
80 TABLET, FILM COATED ORAL DAILY
COMMUNITY
End: 2018-11-19 | Stop reason: DRUGHIGH

## 2018-11-09 RX ORDER — 0.9 % SODIUM CHLORIDE 0.9 %
1000 INTRAVENOUS SOLUTION INTRAVENOUS ONCE
Status: COMPLETED | OUTPATIENT
Start: 2018-11-09 | End: 2018-11-09

## 2018-11-09 RX ORDER — SODIUM CHLORIDE 9 MG/ML
INJECTION, SOLUTION INTRAVENOUS CONTINUOUS
Status: DISCONTINUED | OUTPATIENT
Start: 2018-11-09 | End: 2018-11-09

## 2018-11-09 RX ORDER — CLOPIDOGREL BISULFATE 75 MG/1
75 TABLET ORAL DAILY
COMMUNITY

## 2018-11-09 RX ORDER — 0.9 % SODIUM CHLORIDE 0.9 %
250 INTRAVENOUS SOLUTION INTRAVENOUS ONCE
Status: COMPLETED | OUTPATIENT
Start: 2018-11-09 | End: 2018-11-10

## 2018-11-09 RX ADMIN — METOPROLOL TARTRATE 25 MG: 25 TABLET ORAL at 21:14

## 2018-11-09 RX ADMIN — ACETAMINOPHEN 650 MG: 325 TABLET ORAL at 17:32

## 2018-11-09 RX ADMIN — SODIUM CHLORIDE 250 ML: 9 INJECTION, SOLUTION INTRAVENOUS at 12:46

## 2018-11-09 RX ADMIN — PANTOPRAZOLE SODIUM 40 MG: 40 INJECTION, POWDER, FOR SOLUTION INTRAVENOUS at 10:43

## 2018-11-09 RX ADMIN — PANTOPRAZOLE SODIUM 40 MG: 40 INJECTION, POWDER, FOR SOLUTION INTRAVENOUS at 21:14

## 2018-11-09 RX ADMIN — SODIUM CHLORIDE 1000 ML: 9 INJECTION, SOLUTION INTRAVENOUS at 10:43

## 2018-11-09 RX ADMIN — Medication 10 ML: at 21:14

## 2018-11-09 RX ADMIN — ATORVASTATIN CALCIUM 80 MG: 80 TABLET, FILM COATED ORAL at 21:14

## 2018-11-09 ASSESSMENT — ENCOUNTER SYMPTOMS
ABDOMINAL PAIN: 0
PHOTOPHOBIA: 0
GASTROINTESTINAL NEGATIVE: 1
DIARRHEA: 0
BACK PAIN: 0
STRIDOR: 0
SHORTNESS OF BREATH: 0
COUGH: 0
CHEST TIGHTNESS: 0
APNEA: 0
SORE THROAT: 0
SHORTNESS OF BREATH: 1
EYE PAIN: 0
EYES NEGATIVE: 1
RHINORRHEA: 0
BLOOD IN STOOL: 1
WHEEZING: 0
ALLERGIC/IMMUNOLOGIC NEGATIVE: 1
CHOKING: 0
VOMITING: 0
NAUSEA: 0

## 2018-11-09 ASSESSMENT — PAIN SCALES - GENERAL
PAINLEVEL_OUTOF10: 7
PAINLEVEL_OUTOF10: 5
PAINLEVEL_OUTOF10: 0

## 2018-11-09 NOTE — ED PROVIDER NOTES
No    Sexual activity: Not on file     Other Topics Concern    Not on file     Social History Narrative    No narrative on file       SCREENINGS      @FLOW([id number])@      PHYSICAL EXAM    (up to 7 for level 4, 8 or more for level 5)     ED Triage Vitals [11/09/18 0815]   BP Temp Temp Source Pulse Resp SpO2 Height Weight   109/67 98.1 °F (36.7 °C) Temporal 86 18 96 % 5' 10\" (1.778 m) 202 lb (91.6 kg)       Physical Exam   Constitutional: He is oriented to person, place, and time. He appears well-developed and well-nourished. He is active. No distress. HENT:   Head: Normocephalic and atraumatic. Mouth/Throat: Mucous membranes are normal.   Neck: Normal range of motion. Neck supple. Cardiovascular: Normal rate, regular rhythm, normal heart sounds, intact distal pulses and normal pulses. Pulmonary/Chest: Effort normal and breath sounds normal.   Abdominal: Soft. Normal appearance and bowel sounds are normal. There is no tenderness. Abdomen is soft nontender. Dark stool, guaic positive    Neurological: He is alert and oriented to person, place, and time. He has normal strength. Skin: Skin is warm, dry and intact. No rash noted. He is not diaphoretic. Nursing note and vitals reviewed. All other labs were within normal range or not returned as of this dictation. EMERGENCY DEPARTMENT COURSE and DIFFERENTIALDIAGNOSIS/MDM:   Vitals:    Vitals:    11/09/18 0815 11/09/18 0900 11/09/18 1000   BP: 109/67 (!) 110/53 (!) 111/59   Pulse: 86 76 72   Resp: 18 18 18   Temp: 98.1 °F (36.7 °C)     TempSrc: Temporal     SpO2: 96% 98% 98%   Weight: 202 lb (91.6 kg)     Height: 5' 10\" (1.778 m)              Patient is nontoxic no acute distress. He is afebrile and hemodynamically stable. Patient has a history of indigestion. He has not had an endoscopy. Patient has a hemoglobin of 6.4. He has positive guaiac on exam.  I suspect GI bleed at this time.   I think that this is the intervening factor of his shortness of breath and generalized weakness. Patient does have a mild elevation in his troponin of 0.015. EKG revealed Normal sinus rhythm at 72 bpm with T-wave inversion in leads 3 and aVF. No acute ST changes and no ectopy at this time. Chest x-ray shows no acute pathology. Patient was given fluids, Protonix and crossmatched for 1 unit of blood while in the emergency department. Patient has remained hemodynamic was stable while in the emergency department and will require further evaluation and treatment inpatient late. Dr. Brennan Unger accepted the pt. Critical care time 35 minutes. PROCEDURES:  Unless otherwise noted below, none     Procedures      FINAL IMPRESSION      1. Gastrointestinal hemorrhage, unspecified gastrointestinal hemorrhage type    2.  Elevated troponin          DISPOSITION/PLAN   DISPOSITION Admitted 11/09/2018 10:30:18 AM          Saba Arevalo (electronically signed)  Attending Emergency Physician       Tonya Cancino PA-C  11/09/18 [address] [place], [place]  12/07/18 2023

## 2018-11-09 NOTE — CONSULTS
11/09/18 1246    sodium chloride flush 0.9 % injection 10 mL  10 mL Intravenous PRN Berneta Cely, APRN - CNP        acetaminophen (TYLENOL) tablet 650 mg  650 mg Oral Q4H PRN Berneta Cely, APRN - CNP        [facility] 4 mg  4 mg Intravenous Q6H PRN Berneta Cely, APRN - CNP        0.9 % sodium chloride infusion   Intravenous Continuous Berneta Cely, APRN -  mL/hr at 11/09/18 1235      pantoprazole (PROTONIX) injection 40 mg  40 mg Intravenous Q12H Berneta Cely, APRN - CNP        And    sodium chloride (PF) 0.9 % injection 10 mL  10 mL Intravenous Q12H Berneta Cely, APRN - CNP        [START ON 11/10/2018] clopidogrel (PLAVIX) tablet 75 mg  75 mg Oral Daily Lerry Cools, APRN - CNP        metoprolol tartrate (LOPRESSOR) tablet 25 mg  25 mg Oral BID Hillor Lorrain Najjar, MD        leflunomide (ARAVA) tablet 20 mg  20 mg Oral Daily Hillor Lorrain Najjar, MD        atorvastatin (LIPITOR) tablet 80 mg  80 mg Oral Nightly Hillor Lorrain Najjar, MD           ALLERGIES: Antihistamine [diphenhydramine hcl]    Review of Systems   Constitutional: Negative. Negative for chills and fever. HENT: Negative. Eyes: Negative. Respiratory: Negative for shortness of breath and wheezing. Cardiovascular: Negative for chest pain, palpitations and leg swelling. Gastrointestinal: Negative. Negative for abdominal pain, nausea and vomiting. Endocrine: Negative. Genitourinary: Negative. Musculoskeletal: Negative. Skin: Negative. Negative for rash. Allergic/Immunologic: Negative. Neurological: Negative for dizziness, weakness and headaches. Psychiatric/Behavioral: Negative. VITALS:  Blood pressure 130/69, pulse 69, temperature 97.8 °F (36.6 °C), temperature source Oral, resp. rate 18, height 5' 10\" (1.778 m), weight 205 lb 0.4 oz (93 kg), SpO2 100 %. Body mass index is 29.42 kg/m². Physical Exam   Constitutional: He is oriented to person, place, and time.  He appears

## 2018-11-10 ENCOUNTER — ANESTHESIA (OUTPATIENT)
Dept: OPERATING ROOM | Age: 68
DRG: 378 | End: 2018-11-10
Payer: COMMERCIAL

## 2018-11-10 ENCOUNTER — ANESTHESIA EVENT (OUTPATIENT)
Dept: OPERATING ROOM | Age: 68
DRG: 378 | End: 2018-11-10
Payer: COMMERCIAL

## 2018-11-10 VITALS
RESPIRATION RATE: 22 BRPM | SYSTOLIC BLOOD PRESSURE: 190 MMHG | DIASTOLIC BLOOD PRESSURE: 101 MMHG | OXYGEN SATURATION: 97 %

## 2018-11-10 LAB
ANION GAP SERPL CALCULATED.3IONS-SCNC: 9 MEQ/L (ref 7–13)
BUN BLDV-MCNC: 15 MG/DL (ref 8–23)
CALCIUM SERPL-MCNC: 8.2 MG/DL (ref 8.6–10.2)
CHLORIDE BLD-SCNC: 106 MEQ/L (ref 98–107)
CO2: 25 MEQ/L (ref 22–29)
CREAT SERPL-MCNC: 0.8 MG/DL (ref 0.7–1.2)
GFR AFRICAN AMERICAN: >60
GFR NON-AFRICAN AMERICAN: >60
GLUCOSE BLD-MCNC: 86 MG/DL (ref 74–109)
HCT VFR BLD CALC: 24.4 % (ref 42–52)
HEMOGLOBIN: 8.5 G/DL (ref 14–18)
MCH RBC QN AUTO: 33.6 PG (ref 27–31.3)
MCHC RBC AUTO-ENTMCNC: 34.9 % (ref 33–37)
MCV RBC AUTO: 96.2 FL (ref 80–100)
PDW BLD-RTO: 16.1 % (ref 11.5–14.5)
PLATELET # BLD: 205 K/UL (ref 130–400)
POTASSIUM REFLEX MAGNESIUM: 3.8 MEQ/L (ref 3.5–5.1)
RBC # BLD: 2.53 M/UL (ref 4.7–6.1)
SODIUM BLD-SCNC: 140 MEQ/L (ref 132–144)
WBC # BLD: 9.6 K/UL (ref 4.8–10.8)

## 2018-11-10 PROCEDURE — 6370000000 HC RX 637 (ALT 250 FOR IP): Performed by: NURSE PRACTITIONER

## 2018-11-10 PROCEDURE — 2060000000 HC ICU INTERMEDIATE R&B

## 2018-11-10 PROCEDURE — 3700000001 HC ADD 15 MINUTES (ANESTHESIA): Performed by: SPECIALIST

## 2018-11-10 PROCEDURE — 0W3P8ZZ CONTROL BLEEDING IN GASTROINTESTINAL TRACT, VIA NATURAL OR ARTIFICIAL OPENING ENDOSCOPIC: ICD-10-PCS | Performed by: SPECIALIST

## 2018-11-10 PROCEDURE — C9113 INJ PANTOPRAZOLE SODIUM, VIA: HCPCS | Performed by: NURSE PRACTITIONER

## 2018-11-10 PROCEDURE — 7100000000 HC PACU RECOVERY - FIRST 15 MIN: Performed by: SPECIALIST

## 2018-11-10 PROCEDURE — 2720000010 HC SURG SUPPLY STERILE: Performed by: SPECIALIST

## 2018-11-10 PROCEDURE — 6360000002 HC RX W HCPCS: Performed by: NURSE PRACTITIONER

## 2018-11-10 PROCEDURE — 2500000003 HC RX 250 WO HCPCS: Performed by: ANESTHESIOLOGY

## 2018-11-10 PROCEDURE — 6360000002 HC RX W HCPCS: Performed by: ANESTHESIOLOGY

## 2018-11-10 PROCEDURE — 3700000000 HC ANESTHESIA ATTENDED CARE: Performed by: SPECIALIST

## 2018-11-10 PROCEDURE — 6360000002 HC RX W HCPCS: Performed by: SPECIALIST

## 2018-11-10 PROCEDURE — 6370000000 HC RX 637 (ALT 250 FOR IP): Performed by: INTERNAL MEDICINE

## 2018-11-10 PROCEDURE — 2709999900 HC NON-CHARGEABLE SUPPLY: Performed by: SPECIALIST

## 2018-11-10 PROCEDURE — 2580000003 HC RX 258: Performed by: ANESTHESIOLOGY

## 2018-11-10 PROCEDURE — 36415 COLL VENOUS BLD VENIPUNCTURE: CPT

## 2018-11-10 PROCEDURE — 3609017100 HC EGD: Performed by: SPECIALIST

## 2018-11-10 PROCEDURE — 7100000001 HC PACU RECOVERY - ADDTL 15 MIN: Performed by: SPECIALIST

## 2018-11-10 PROCEDURE — 80048 BASIC METABOLIC PNL TOTAL CA: CPT

## 2018-11-10 PROCEDURE — 2580000003 HC RX 258: Performed by: NURSE PRACTITIONER

## 2018-11-10 PROCEDURE — 85027 COMPLETE CBC AUTOMATED: CPT

## 2018-11-10 RX ORDER — FOLIC ACID 1 MG/1
1 TABLET ORAL DAILY
Status: DISCONTINUED | OUTPATIENT
Start: 2018-11-10 | End: 2018-11-11 | Stop reason: HOSPADM

## 2018-11-10 RX ORDER — EPINEPHRINE 1 MG/ML
INJECTION, SOLUTION, CONCENTRATE INTRAVENOUS PRN
Status: DISCONTINUED | OUTPATIENT
Start: 2018-11-10 | End: 2018-11-10 | Stop reason: HOSPADM

## 2018-11-10 RX ORDER — PROPOFOL 10 MG/ML
INJECTION, EMULSION INTRAVENOUS CONTINUOUS PRN
Status: DISCONTINUED | OUTPATIENT
Start: 2018-11-10 | End: 2018-11-10 | Stop reason: SDUPTHER

## 2018-11-10 RX ORDER — SODIUM CHLORIDE, SODIUM LACTATE, POTASSIUM CHLORIDE, CALCIUM CHLORIDE 600; 310; 30; 20 MG/100ML; MG/100ML; MG/100ML; MG/100ML
INJECTION, SOLUTION INTRAVENOUS CONTINUOUS PRN
Status: DISCONTINUED | OUTPATIENT
Start: 2018-11-10 | End: 2018-11-10 | Stop reason: SDUPTHER

## 2018-11-10 RX ORDER — LIDOCAINE HYDROCHLORIDE 20 MG/ML
INJECTION, SOLUTION INFILTRATION; PERINEURAL PRN
Status: DISCONTINUED | OUTPATIENT
Start: 2018-11-10 | End: 2018-11-10 | Stop reason: SDUPTHER

## 2018-11-10 RX ORDER — SODIUM CHLORIDE, SODIUM LACTATE, POTASSIUM CHLORIDE, CALCIUM CHLORIDE 600; 310; 30; 20 MG/100ML; MG/100ML; MG/100ML; MG/100ML
INJECTION, SOLUTION INTRAVENOUS
Status: COMPLETED
Start: 2018-11-10 | End: 2018-11-10

## 2018-11-10 RX ORDER — PROPOFOL 10 MG/ML
INJECTION, EMULSION INTRAVENOUS PRN
Status: DISCONTINUED | OUTPATIENT
Start: 2018-11-10 | End: 2018-11-10 | Stop reason: SDUPTHER

## 2018-11-10 RX ORDER — ONDANSETRON 2 MG/ML
4 INJECTION INTRAMUSCULAR; INTRAVENOUS
Status: DISCONTINUED | OUTPATIENT
Start: 2018-11-10 | End: 2018-11-10 | Stop reason: HOSPADM

## 2018-11-10 RX ORDER — LISINOPRIL 10 MG/1
10 TABLET ORAL DAILY
Status: DISCONTINUED | OUTPATIENT
Start: 2018-11-10 | End: 2018-11-11 | Stop reason: HOSPADM

## 2018-11-10 RX ADMIN — PANTOPRAZOLE SODIUM 40 MG: 40 INJECTION, POWDER, FOR SOLUTION INTRAVENOUS at 10:28

## 2018-11-10 RX ADMIN — ACETAMINOPHEN 650 MG: 325 TABLET ORAL at 16:40

## 2018-11-10 RX ADMIN — SODIUM CHLORIDE, POTASSIUM CHLORIDE, SODIUM LACTATE AND CALCIUM CHLORIDE: 600; 310; 30; 20 INJECTION, SOLUTION INTRAVENOUS at 13:42

## 2018-11-10 RX ADMIN — METOPROLOL TARTRATE 25 MG: 25 TABLET ORAL at 21:39

## 2018-11-10 RX ADMIN — LIDOCAINE HYDROCHLORIDE 40 MG: 20 INJECTION, SOLUTION INFILTRATION; PERINEURAL at 13:49

## 2018-11-10 RX ADMIN — LEFLUNOMIDE 20 MG: 10 TABLET ORAL at 16:39

## 2018-11-10 RX ADMIN — PROPOFOL 60 MG: 10 INJECTION, EMULSION INTRAVENOUS at 13:49

## 2018-11-10 RX ADMIN — Medication 10 ML: at 21:40

## 2018-11-10 RX ADMIN — FOLIC ACID 1 MG: 1 TABLET ORAL at 16:40

## 2018-11-10 RX ADMIN — ATORVASTATIN CALCIUM 80 MG: 80 TABLET, FILM COATED ORAL at 21:39

## 2018-11-10 RX ADMIN — PANTOPRAZOLE SODIUM 40 MG: 40 INJECTION, POWDER, FOR SOLUTION INTRAVENOUS at 21:39

## 2018-11-10 RX ADMIN — ACETAMINOPHEN 650 MG: 325 TABLET ORAL at 21:41

## 2018-11-10 RX ADMIN — LISINOPRIL 10 MG: 10 TABLET ORAL at 16:40

## 2018-11-10 RX ADMIN — Medication 10 ML: at 10:29

## 2018-11-10 RX ADMIN — ACETAMINOPHEN 650 MG: 325 TABLET ORAL at 09:46

## 2018-11-10 RX ADMIN — PROPOFOL 75 MCG/KG/MIN: 10 INJECTION, EMULSION INTRAVENOUS at 13:50

## 2018-11-10 ASSESSMENT — PULMONARY FUNCTION TESTS
PIF_VALUE: 0
PIF_VALUE: 2
PIF_VALUE: 0

## 2018-11-10 ASSESSMENT — PAIN SCALES - GENERAL
PAINLEVEL_OUTOF10: 6
PAINLEVEL_OUTOF10: 5
PAINLEVEL_OUTOF10: 5
PAINLEVEL_OUTOF10: 7

## 2018-11-10 NOTE — OP NOTE
Endoscopy Note    Patient: Norma Parikh  YOB: 1950  MRN: [id number]  Date of Procedure: 11/10/2018    Pre-Op Diagnosis: Anemia and GI bleeding. Post-Op Diagnosis: Same , duodenal dilefoys ulcer       Procedure(s):  EGD ESOPHAGOGASTRODUODENOSCOPY and hemostasis    Anesthesia: Other    Surgeon(s):  Tevin Maldonado MD    Staff:  Scrub Person First: Keren Black     Estimated Blood Loss: * No values recorded between 11/10/2018  1:46 PM and 11/10/2018  2:06 PM *    Specimens:   * No specimens in log *    Indications: This is a 79y.o. year old male who presents for upper endoscopy with h/o. Passing dark stool and anemia. Patient patient had a recent coronary stenting and has been on aspirin and Plavix. Description of Procedure:  Informed consent was obtained from the patient after explanation of indications, benefits and possible risks and complications of the procedure. The patient was then taken to the endoscopy suite, placed in the left lateral decubitus position and the above IV sedation was administrered. Olympus video gastroscope was inserted into the esophagus and advanced up to the distal duodenum. Esophageal mucosa is normal.  No inflammation or ulcerations were seen. GE junction is about 36 cm from the incisors. Stomach fundus body antrum and pylorus was examined that was normal.  Duodenal bulb and post bulbar duodenum was examined. In the third part of the duodenum there was some fresh blood seen. This was irrigated water and there was continued oozing of fresh blood seen from this area. The mucosa was lifted using a biopsy forceps and I could see an area were the oozing of fresh blood seen. No definite ulcer crater was seen. 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 The area was observed for some time and the active bleeding seem to have stopped.     The patient tolerated the

## 2018-11-10 NOTE — PLAN OF CARE
Problem: Infection:  Goal: Will remain free from infection  Will remain free from infection   Outcome: Met This Shift

## 2018-11-10 NOTE — ANESTHESIA PRE PROCEDURE
Facility-Administered Medications   Medication Dose Route Frequency Provider Last Rate Last Dose    sodium chloride flush 0.9 % injection 10 mL  10 mL Intravenous PRN Gutierrez Malling, APRN - CNP        acetaminophen (TYLENOL) tablet 650 mg  650 mg Oral Q4H PRN Gutierrez Malling, APRN - CNP   650 mg at 11/10/18 0946    ondansetron (ZOFRAN) injection 4 mg  4 mg Intravenous Q6H PRN Gutierrez Malling, APRN - CNP        pantoprazole (PROTONIX) injection 40 mg  40 mg Intravenous Q12H Gutierrez Malling, APRN - CNP   40 mg at 11/10/18 1028    And    sodium chloride (PF) 0.9 % injection 10 mL  10 mL Intravenous Q12H Gutierrez Malling, APRN - CNP   10 mL at 11/10/18 1029    clopidogrel (PLAVIX) tablet 75 mg  75 mg Oral Daily Yeny Cummings, APRN - CNP        metoprolol tartrate (LOPRESSOR) tablet 25 mg  25 mg Oral BID Jasmin Chand MD   25 mg at 11/09/18 2114    leflunomide (ARAVA) tablet 20 mg  20 mg Oral Daily Jasmin Chand MD        atorvastatin (LIPITOR) tablet 80 mg  80 mg Oral Nightly Jasmin Chand MD   80 mg at 11/09/18 2114       Allergies:     Allergies   Allergen Reactions    Antihistamine [Diphenhydramine Hcl] Other (See Comments)     jittery       Problem List:    Patient Active Problem List   Diagnosis Code    Hypertension I10    Rheumatoid arthritis ([facility] [address] M06.9    Seasonal allergic rhinitis J30.2    Chronic sinusitis J32.9    Acute blood loss anemia D62    Gastrointestinal hemorrhage K92.2       Past Medical History:        Diagnosis Date    Depression 2007    Hyperlipidemia     Hypertension     Osteoarthritis     RA (rheumatoid arthritis) ([facility] [address]     [facility] Dr. Jermain Foster Rheumatoid arthritis(714.0)        Past Surgical History:        Procedure Laterality Date    FRACTURE SURGERY  2004    OPEN REDUCTION LEFT LEG       Social History:    Social History   Substance Use Topics    Smoking status: Former Smoker     Packs/day: 1.00     Years: 45.00     Types: Cigarettes     Quit date: 11/16/2017   

## 2018-11-10 NOTE — CONSULTS
Social History Narrative    None       Family History   Problem Relation Age of Onset    High Blood Pressure Father     Heart Attack Father        Current Facility-Administered Medications   Medication Dose Route Frequency Provider Last Rate Last Dose    sodium chloride flush 0.9 % injection 10 mL  10 mL Intravenous PRN Marlea Klaus, APRN - CNP        acetaminophen (TYLENOL) tablet 650 mg  650 mg Oral Q4H PRN Marlea Klaus, APRN - CNP   650 mg at 11/09/18 1732    ondansetron (ZOFRAN) injection 4 mg  4 mg Intravenous Q6H PRN Marlea Klaus, APRN - CNP        pantoprazole (PROTONIX) injection 40 mg  40 mg Intravenous Q12H Marlea Klaus, APRN - CNP   40 mg at 11/09/18 2114    And    sodium chloride (PF) 0.9 % injection 10 mL  10 mL Intravenous Q12H Marlea Klaus, APRN - CNP   10 mL at 11/09/18 2114    clopidogrel (PLAVIX) tablet 75 mg  75 mg Oral Daily Cedric Moran APRN - CNP        metoprolol tartrate (LOPRESSOR) tablet 25 mg  25 mg Oral BID Jasmin Thakkar MD   25 mg at 11/09/18 2114    leflunomide (ARAVA) tablet 20 mg  20 mg Oral Daily Jasmin Thakkar MD        atorvastatin (LIPITOR) tablet 80 mg  80 mg Oral Nightly Jasmin Thakkar MD   80 mg at 11/09/18 2114       ALLERGIES: Antihistamine [diphenhydramine hcl]    Review of Systems   Constitutional: Negative. Negative for chills and fever. HENT: Negative. Eyes: Negative. Respiratory: Negative for shortness of breath and wheezing. Cardiovascular: Negative for chest pain, palpitations and leg swelling. Gastrointestinal: Negative. Negative for abdominal pain, nausea and vomiting. Endocrine: Negative. Genitourinary: Negative. Musculoskeletal: Negative. Skin: Negative. Negative for rash. Allergic/Immunologic: Negative. Neurological: Negative for dizziness, weakness and headaches. Psychiatric/Behavioral: Negative.           VITALS:  Blood pressure 121/62, pulse 85, temperature 100.2 °F (37.9 °C), temperature as documented in his note . History element: 79year old male with afib CAD, recent PCI. GIB, holding NOAC and ASA. Doing well no complaints. Physical element: NAD, RRR, CTA B soft, no edema  Plan element: Endoscopy. Holding NOAC and ASA. Rosendo Madden MD [facility] Director of Cardiology Services and Cardiac Catheterization Laboratory  [facility], [facility]

## 2018-11-10 NOTE — CONSULTS
Karina [address]                      [address], [address] [facility]                                  CONSULTATION    PATIENT NAME: Alejandra Novoa                  :        1950  MED REC NO:   [id number]                            ROOM:       W175  ACCOUNT NO:   [de-identified]                           ADMIT DATE: 2018  PROVIDER:     Thania Lopez MD    CONSULT DATE:  2018    REASON FOR CONSULTATION:  Anemia and passing dark stool. HISTORY OF PRESENT ILLNESS:  The patient is a 21-year-old male who was  admitted because of weakness and fatigue. The patient almost passed out  at home. He was found to be very pale. He reported having passed some  dark stool few days ago. The patient had a recent cardiac intervention  when he was admitted with chest discomfort and underwent angioplasty and  has been on Plavix and aspirin. He has been experiencing occasional  dyspeptic symptoms and has been taking omeprazole with no complete  relief. He reports no prior history of any ulcer disease. No history  of any dysphagia. No weight loss. No history of any rectal bleeding. PAST MEDICAL HISTORY:  Includes history of hypertension, depression, and  rheumatoid arthritis. SOCIAL HISTORY:  Ex-smoker, currently not smoking. Drinks alcohol very  occasionally. The patient does not take any nonsteroidal  anti-inflammatory agents. HOME MEDICATIONS:  Eliquis, Levaquin, Lopressor, Fosamax, B12,  Rheumatrex, and folic acid. ALLERGIES:  Include ANTIHISTAMINE. FAMILY HISTORY:  Unremarkable. PHYSICAL EXAMINATION:  GENERAL:  A 21-year-old male who seems to be in no acute distress. He  states he is feeling better after getting transfusion. VITAL SIGNS:  All stable. HEENT:  Oropharynx is normal.  HEART:  S1 and S2 regular. ABDOMEN:  Soft and nontender. No palpable mass. EXTREMITIES:  Shows no edema.     LABORATORY DATA:  His lab shows sodium 138, potassium 5,

## 2018-11-11 VITALS
DIASTOLIC BLOOD PRESSURE: 66 MMHG | HEART RATE: 93 BPM | BODY MASS INDEX: 29.35 KG/M2 | RESPIRATION RATE: 17 BRPM | OXYGEN SATURATION: 97 % | TEMPERATURE: 99 F | WEIGHT: 205.03 LBS | SYSTOLIC BLOOD PRESSURE: 147 MMHG | HEIGHT: 70 IN

## 2018-11-11 LAB
ANION GAP SERPL CALCULATED.3IONS-SCNC: 11 MEQ/L (ref 7–13)
BUN BLDV-MCNC: 15 MG/DL (ref 8–23)
CALCIUM SERPL-MCNC: 8.5 MG/DL (ref 8.6–10.2)
CHLORIDE BLD-SCNC: 102 MEQ/L (ref 98–107)
CO2: 23 MEQ/L (ref 22–29)
CREAT SERPL-MCNC: 0.78 MG/DL (ref 0.7–1.2)
EKG ATRIAL RATE: 102 BPM
EKG ATRIAL RATE: 72 BPM
EKG P AXIS: 29 DEGREES
EKG P AXIS: 36 DEGREES
EKG P-R INTERVAL: 140 MS
EKG P-R INTERVAL: 142 MS
EKG Q-T INTERVAL: 326 MS
EKG Q-T INTERVAL: 362 MS
EKG QRS DURATION: 86 MS
EKG QRS DURATION: 88 MS
EKG QTC CALCULATION (BAZETT): 396 MS
EKG QTC CALCULATION (BAZETT): 424 MS
EKG R AXIS: 75 DEGREES
EKG R AXIS: 76 DEGREES
EKG T AXIS: -6 DEGREES
EKG T AXIS: -6 DEGREES
EKG VENTRICULAR RATE: 102 BPM
EKG VENTRICULAR RATE: 72 BPM
GFR AFRICAN AMERICAN: >60
GFR NON-AFRICAN AMERICAN: >60
GLUCOSE BLD-MCNC: 97 MG/DL (ref 74–109)
HCT VFR BLD CALC: 26.6 % (ref 42–52)
HEMOGLOBIN: 9.1 G/DL (ref 14–18)
MCH RBC QN AUTO: 33.3 PG (ref 27–31.3)
MCHC RBC AUTO-ENTMCNC: 34.3 % (ref 33–37)
MCV RBC AUTO: 97.2 FL (ref 80–100)
PDW BLD-RTO: 15.8 % (ref 11.5–14.5)
PLATELET # BLD: 211 K/UL (ref 130–400)
POTASSIUM REFLEX MAGNESIUM: 4.1 MEQ/L (ref 3.5–5.1)
RBC # BLD: 2.74 M/UL (ref 4.7–6.1)
SODIUM BLD-SCNC: 136 MEQ/L (ref 132–144)
WBC # BLD: 6.7 K/UL (ref 4.8–10.8)

## 2018-11-11 PROCEDURE — C9113 INJ PANTOPRAZOLE SODIUM, VIA: HCPCS | Performed by: NURSE PRACTITIONER

## 2018-11-11 PROCEDURE — 36415 COLL VENOUS BLD VENIPUNCTURE: CPT

## 2018-11-11 PROCEDURE — 6370000000 HC RX 637 (ALT 250 FOR IP): Performed by: INTERNAL MEDICINE

## 2018-11-11 PROCEDURE — 99232 SBSQ HOSP IP/OBS MODERATE 35: CPT | Performed by: INTERNAL MEDICINE

## 2018-11-11 PROCEDURE — 85027 COMPLETE CBC AUTOMATED: CPT

## 2018-11-11 PROCEDURE — 6370000000 HC RX 637 (ALT 250 FOR IP): Performed by: NURSE PRACTITIONER

## 2018-11-11 PROCEDURE — 80048 BASIC METABOLIC PNL TOTAL CA: CPT

## 2018-11-11 PROCEDURE — 6360000002 HC RX W HCPCS: Performed by: NURSE PRACTITIONER

## 2018-11-11 PROCEDURE — 93005 ELECTROCARDIOGRAM TRACING: CPT

## 2018-11-11 PROCEDURE — 2580000003 HC RX 258: Performed by: NURSE PRACTITIONER

## 2018-11-11 RX ORDER — GLUCOSAMINE/MSM/CHONDROITIN A 500-83-400
50 TABLET ORAL 2 TIMES DAILY
COMMUNITY

## 2018-11-11 RX ADMIN — METOPROLOL TARTRATE 25 MG: 25 TABLET ORAL at 10:00

## 2018-11-11 RX ADMIN — CLOPIDOGREL BISULFATE 75 MG: 75 TABLET ORAL at 09:59

## 2018-11-11 RX ADMIN — Medication 10 ML: at 12:41

## 2018-11-11 RX ADMIN — PANTOPRAZOLE SODIUM 40 MG: 40 INJECTION, POWDER, FOR SOLUTION INTRAVENOUS at 09:59

## 2018-11-11 RX ADMIN — LEFLUNOMIDE 20 MG: 10 TABLET ORAL at 10:00

## 2018-11-11 RX ADMIN — FOLIC ACID 1 MG: 1 TABLET ORAL at 09:59

## 2018-11-11 RX ADMIN — LISINOPRIL 10 MG: 10 TABLET ORAL at 10:00

## 2018-11-11 ASSESSMENT — ENCOUNTER SYMPTOMS
APNEA: 0
FACIAL SWELLING: 0
EYE ITCHING: 0
ABDOMINAL PAIN: 0
SHORTNESS OF BREATH: 0
VOMITING: 0
COLOR CHANGE: 0
STRIDOR: 0
CHEST TIGHTNESS: 0
NAUSEA: 0
WHEEZING: 0
EYES NEGATIVE: 1
ALLERGIC/IMMUNOLOGIC NEGATIVE: 1
BACK PAIN: 0
GASTROINTESTINAL NEGATIVE: 1
EYE DISCHARGE: 0
COUGH: 0
SINUS PRESSURE: 0
CHOKING: 0
SORE THROAT: 0
TROUBLE SWALLOWING: 0

## 2018-11-11 ASSESSMENT — PAIN SCALES - GENERAL
PAINLEVEL_OUTOF10: 0

## 2018-11-11 NOTE — DISCHARGE SUMMARY
by mouth daily             Coenzyme Q10 (COQ-10) 50 MG CAPS  Take 50 mg by mouth 2 times daily             esomeprazole Magnesium (NEXIUM) 40 MG PACK  Take 40 mg by mouth daily             folic acid (FOLVITE) 1 MG tablet  Take 1 mg by mouth daily             LACTOBACILLUS RHAMNOSUS, GG, PO  Take 1 capsule by mouth daily             leflunomide (ARAVA) 20 MG tablet  Take 1 tablet by mouth daily. lisinopril (PRINIVIL;ZESTRIL) 10 MG tablet  Take 10 mg by mouth daily             methotrexate (RHEUMATREX) 2.5 MG chemo tablet  Take 4-6 tabs by mouth once a week with dinner. No alcohol. Hold if on antibiotics or ill.             metoprolol tartrate (LOPRESSOR) 25 MG tablet  Take 1 tablet by mouth 2 times daily             predniSONE (DELTASONE) 5 MG tablet  Take 5 mg by mouth daily             Tofacitinib Citrate 11 MG TB24  Take 1 tablet by mouth daily             VITAMIN D, CHOLECALCIFEROL, PO  Take 4,000 Int'l Units by mouth daily                  Disposition:   If discharged to Home, Any [place] [id number] needs that were indicated and/or required as been addressed and set up by Social Work. Condition at discharge: Pt was medically stable at the time of discharge. Activity: activity as tolerated    Total time taken for discharging this patient: 40 minutes. Greater than 70% of time was spent focused exclusively on this patient. Time was taken to review chart, discuss plans with consultants, reconciling medications, discussing plan answering questions with patient.      [facility]igned[facility]  11/11/2018, 12:24 PM  ----------------------------------------------------------------------------------------------------------------------    Dajuan Kenney

## 2018-11-11 NOTE — PROGRESS NOTES
Progress Note  Patient: Yoan Wiseman  Unit/Bed: A283/W484-20  YOB: 1950  MRN: [id number]  Acct: [de-identified]   Admitting Diagnosis: Acute blood loss anemia [D62]  Admit Date:  11/9/2018  Hospital Day: 2    Chief Complaint:  SOB  Pain in legs  Dark color stool   recent PCI          Patient is a 79 y.o. male who presents with a chief complaint of GIB. Patient is followed on a regular basis by Dr. Joaquin Reddy MD. S/p recent PCI of RCA in mid October- in emergency setting-d/t indigestion and chest pain. Patient also with hx of Afib- which was related to PE at that time- pt was on xarelto - he has not had any episodes of afib since. He was On triple therapy for recent PCI and Afibb. Noted to have GIB and severe anemia needing PRBC transfusion now. Pt denies chest pain,  fatigue,  nausea, vomiting, diarrhea, constipation, motor weakness, insomnia, weight loss, syncope, dizziness, lightheadedness, palpitations, PND, orthopnea, or claudication.      Today - pt is doing good, he is s/p endoscope with tx for duodenal ulcer tx, he denies any chest pain, he does not require supplemental 02 and he feels ok. He has no dizziness, SOB or chest discomfort. He continues not to have any CP or SOB as before          Review of Systems:   Review of Systems   Constitutional: Negative for appetite change, chills, diaphoresis, fatigue and fever. HENT: Negative for congestion, ear pain, facial swelling, hearing loss, sinus pressure, sore throat, tinnitus and trouble swallowing. Eyes: Negative for discharge, itching and visual disturbance. Respiratory: Negative for apnea, cough, choking, chest tightness, shortness of breath, wheezing and stridor. Cardiovascular: Negative for chest pain, palpitations and leg swelling. Gastrointestinal: Negative. Endocrine: Negative for cold intolerance, heat intolerance, polydipsia and polyuria.    Genitourinary: Negative for dysuria, flank pain, frequency, hematuria related to PE and illness then- had not had any episodes of A-fib here   3. Cont with Plavix given recent PCI of RCA  4. Monitor on tele  5. GI/DVT prophylaxix- is on Protonix form GI  6. Status post endoscopy with duodenal ulcer tx 11/10  7. GI recs  8.  Ok to d/c per cardio  9- EKG   he would like to follow up with dr toney for cardiology from now on  Electronically signedby JANES Jones - CNP on 11/11/2018 at 8:55 AM

## 2018-11-12 PROCEDURE — 93010 ELECTROCARDIOGRAM REPORT: CPT | Performed by: INTERNAL MEDICINE

## 2018-11-16 PROBLEM — T38.0X5A STEROID-INDUCED OSTEOPOROSIS: Status: ACTIVE | Noted: 2017-02-10

## 2018-11-16 PROBLEM — I21.4 NSTEMI (NON-ST ELEVATED MYOCARDIAL INFARCTION) (HCC): Status: ACTIVE | Noted: 2018-10-19

## 2018-11-16 PROBLEM — Z79.899 LONG-TERM USE OF HIGH-RISK MEDICATION: Status: ACTIVE | Noted: 2017-06-26

## 2018-11-16 PROBLEM — I26.99 PE (PULMONARY THROMBOEMBOLISM) (HCC): Status: ACTIVE | Noted: 2017-11-16

## 2018-11-16 PROBLEM — Z79.631 METHOTREXATE, LONG TERM, CURRENT USE: Status: ACTIVE | Noted: 2017-02-10

## 2018-11-16 PROBLEM — Z98.61 POST PTCA: Status: ACTIVE | Noted: 2018-10-22

## 2018-11-16 PROBLEM — I25.2 HISTORY OF NON-ST ELEVATION MYOCARDIAL INFARCTION (NSTEMI): Status: ACTIVE | Noted: 2018-10-19

## 2018-11-16 PROBLEM — I48.0 PAROXYSMAL ATRIAL FIBRILLATION WITH RVR (HCC): Status: ACTIVE | Noted: 2017-11-18

## 2018-11-16 PROBLEM — M81.8 STEROID-INDUCED OSTEOPOROSIS: Status: ACTIVE | Noted: 2017-02-10

## 2018-11-16 PROBLEM — E78.9 LIPID DISORDER: Status: ACTIVE | Noted: 2018-10-22

## 2018-11-16 RX ORDER — METOPROLOL TARTRATE 50 MG/1
50 TABLET, FILM COATED ORAL 2 TIMES DAILY
COMMUNITY
Start: 2017-12-06 | End: 2019-02-27

## 2018-11-16 RX ORDER — PANTOPRAZOLE SODIUM 40 MG/1
40 TABLET, DELAYED RELEASE ORAL DAILY
COMMUNITY
Start: 2018-10-30 | End: 2019-01-23

## 2018-11-16 RX ORDER — ASPIRIN 81 MG/1
81 TABLET, CHEWABLE ORAL
Status: ON HOLD | COMMUNITY
Start: 2018-10-24 | End: 2019-01-11

## 2018-11-19 ENCOUNTER — OFFICE VISIT (OUTPATIENT)
Dept: CARDIOLOGY CLINIC | Age: 68
End: 2018-11-19
Payer: COMMERCIAL

## 2018-11-19 VITALS
HEIGHT: 70 IN | WEIGHT: 201.8 LBS | OXYGEN SATURATION: 98 % | BODY MASS INDEX: 28.89 KG/M2 | SYSTOLIC BLOOD PRESSURE: 124 MMHG | DIASTOLIC BLOOD PRESSURE: 82 MMHG | HEART RATE: 83 BPM | RESPIRATION RATE: 22 BRPM

## 2018-11-19 DIAGNOSIS — I48.0 PAROXYSMAL ATRIAL FIBRILLATION WITH RVR (HCC): Primary | ICD-10-CM

## 2018-11-19 DIAGNOSIS — K62.5 GASTROINTESTINAL HEMORRHAGE ASSOCIATED WITH ANORECTAL SOURCE: ICD-10-CM

## 2018-11-19 DIAGNOSIS — I26.99 PE (PULMONARY THROMBOEMBOLISM) (HCC): ICD-10-CM

## 2018-11-19 DIAGNOSIS — Z09 HOSPITAL DISCHARGE FOLLOW-UP: ICD-10-CM

## 2018-11-19 DIAGNOSIS — Z98.61 POST PTCA: ICD-10-CM

## 2018-11-19 DIAGNOSIS — I25.2 HISTORY OF NON-ST ELEVATION MYOCARDIAL INFARCTION (NSTEMI): ICD-10-CM

## 2018-11-19 DIAGNOSIS — I10 ESSENTIAL HYPERTENSION: ICD-10-CM

## 2018-11-19 PROCEDURE — 99214 OFFICE O/P EST MOD 30 MIN: CPT | Performed by: INTERNAL MEDICINE

## 2018-11-19 RX ORDER — ATORVASTATIN CALCIUM 20 MG/1
TABLET, FILM COATED ORAL
Refills: 0 | COMMUNITY
Start: 2018-08-14 | End: 2019-02-26 | Stop reason: DRUGHIGH

## 2018-11-19 ASSESSMENT — ENCOUNTER SYMPTOMS
WHEEZING: 0
VOICE CHANGE: 0
FACIAL SWELLING: 0
TROUBLE SWALLOWING: 0
CHEST TIGHTNESS: 0
ABDOMINAL DISTENTION: 0
BLOOD IN STOOL: 0
ANAL BLEEDING: 0
COLOR CHANGE: 0
SHORTNESS OF BREATH: 0
APNEA: 0
DIARRHEA: 0
VOMITING: 0
NAUSEA: 0

## 2018-11-19 NOTE — PROGRESS NOTES
distal pulses and normal pulses. PMI is not displaced. No murmur heard. Pulmonary/Chest: He has no wheezes. He has no rales. He exhibits no tenderness. Abdominal: Soft. Bowel sounds are normal. He exhibits no distension and no mass. There is no splenomegaly or hepatomegaly. There is no tenderness. No hernia. Neurological: He is alert and oriented to person, place, and time. He has normal motor skills. Gait normal.   Skin: Skin is warm and dry. No cyanosis. No jaundice. Nails show no clubbing.        LABS:  CBC:   Lab Results   Component Value Date    WBC 6.7 11/11/2018    RBC 2.74 11/11/2018    RBC 4.38 04/26/2012    HGB 9.1 11/11/2018    HCT 26.6 11/11/2018    MCV 97.2 11/11/2018    MCH 33.3 11/11/2018    MCHC 34.3 11/11/2018    RDW 15.8 11/11/2018     11/11/2018    MPV 8.3 04/26/2012     Lipids:  Lab Results   Component Value Date    CHOL 244 (H) 06/20/2013     Lab Results   Component Value Date    TRIG 86 06/20/2013     Lab Results   Component Value Date    HDL 59 06/20/2013     Lab Results   Component Value Date    LDLCALC 168 (H) 06/20/2013     No results found for: LABVLDL, VLDL  No results found for: CHOLHDLRATIO  CMP:    Lab Results   Component Value Date     11/11/2018    K 4.1 11/11/2018     11/11/2018    CO2 23 11/11/2018    BUN 15 11/11/2018    CREATININE 0.78 11/11/2018    GFRAA >60.0 11/11/2018    LABGLOM >60.0 11/11/2018    GLUCOSE 97 11/11/2018    GLUCOSE 95 04/26/2012    PROT 5.4 11/09/2018    LABALBU 3.4 11/09/2018    LABALBU 4.5 04/26/2012    CALCIUM 8.5 11/11/2018    BILITOT 0.5 11/09/2018    ALKPHOS 38 11/09/2018    AST 30 11/09/2018    ALT 27 11/09/2018     BMP:    Lab Results   Component Value Date     11/11/2018    K 4.1 11/11/2018     11/11/2018    CO2 23 11/11/2018    BUN 15 11/11/2018    LABALBU 3.4 11/09/2018    LABALBU 4.5 04/26/2012    CREATININE 0.78 11/11/2018    CALCIUM 8.5 11/11/2018    GFRAA >60.0 11/11/2018    LABGLOM >60.0 11/11/2018

## 2018-12-03 ENCOUNTER — OFFICE VISIT (OUTPATIENT)
Dept: CARDIOLOGY CLINIC | Age: 68
End: 2018-12-03
Payer: COMMERCIAL

## 2018-12-03 VITALS
OXYGEN SATURATION: 95 % | RESPIRATION RATE: 22 BRPM | HEART RATE: 77 BPM | BODY MASS INDEX: 28.4 KG/M2 | WEIGHT: 198.4 LBS | DIASTOLIC BLOOD PRESSURE: 84 MMHG | SYSTOLIC BLOOD PRESSURE: 132 MMHG | HEIGHT: 70 IN

## 2018-12-03 DIAGNOSIS — Z98.61 POST PTCA: ICD-10-CM

## 2018-12-03 DIAGNOSIS — I10 ESSENTIAL HYPERTENSION: ICD-10-CM

## 2018-12-03 DIAGNOSIS — I48.0 PAROXYSMAL ATRIAL FIBRILLATION WITH RVR (HCC): Primary | ICD-10-CM

## 2018-12-03 DIAGNOSIS — I25.2 HISTORY OF NON-ST ELEVATION MYOCARDIAL INFARCTION (NSTEMI): ICD-10-CM

## 2018-12-03 PROCEDURE — 99214 OFFICE O/P EST MOD 30 MIN: CPT | Performed by: INTERNAL MEDICINE

## 2018-12-03 ASSESSMENT — ENCOUNTER SYMPTOMS
APNEA: 0
DIARRHEA: 0
CHEST TIGHTNESS: 0
VOMITING: 0
NAUSEA: 0
BLOOD IN STOOL: 0
SHORTNESS OF BREATH: 0

## 2018-12-03 NOTE — PROGRESS NOTES
11/11/2018     11/11/2018    CO2 23 11/11/2018    BUN 15 11/11/2018    LABALBU 3.4 11/09/2018    LABALBU 4.5 04/26/2012    CREATININE 0.78 11/11/2018    CALCIUM 8.5 11/11/2018    GFRAA >60.0 11/11/2018    LABGLOM >60.0 11/11/2018    GLUCOSE 97 11/11/2018    GLUCOSE 95 04/26/2012     Magnesium:    Lab Results   Component Value Date    MG 1.8 11/09/2018     TSH:No results found for: TSHFT4, TSH    Patient Active Problem List   Diagnosis    Essential hypertension    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 (HCC)    Seasonal allergic rhinitis    Chronic sinusitis    Acute blood loss anemia    Gastrointestinal hemorrhage    Elevated troponin    Depression    Lipid disorder    Paroxysmal atrial fibrillation with RVR (HCC)    Post PTCA    Steroid-induced osteoporosis    Vitamin D deficiency    Synovitis    PE (pulmonary throm[facility]embolism) ([facility] [address]    History of non-ST elevation myocardial infarction (NSTEMI)    Long-term use of high-risk medication    Methotrexate, long term, current use    Nicotine dependence, uncomplicated       There are no discontinued medications. Modified Medications    No medications on file       No orders of the defined types were placed in this encounter. Assessment:    1. Paroxysmal atrial fibrillation with RVR ([facility] [address]    2. Essential hypertension    3. History of non-ST elevation myocardial infarction (NSTEMI)  - [facility]    4. Post PTCA  - [facility]       Plan:   Patient referred to cardiac rehab. Stay on same medications. Patient to see me in 3 months. This note was partially generated using Dragon voice recognition system, and there may be some incorrect words, spellings, punctuation that were not noticed in checking the note before saving.         Electronically signed by Valentino Willson MD on 12/4/2018 at 3:01 PM

## 2018-12-04 ASSESSMENT — ENCOUNTER SYMPTOMS
COLOR CHANGE: 0
ABDOMINAL DISTENTION: 0

## 2018-12-05 ENCOUNTER — HOSPITAL ENCOUNTER (OUTPATIENT)
Dept: CARDIAC REHAB | Age: 68
Setting detail: THERAPIES SERIES
Discharge: HOME | End: 2018-12-05
Payer: COMMERCIAL

## 2018-12-05 PROCEDURE — 93798 PHYS/QHP OP CAR RHAB W/ECG: CPT

## 2018-12-10 ENCOUNTER — HOSPITAL ENCOUNTER (OUTPATIENT)
Dept: CARDIAC REHAB | Age: 68
Setting detail: THERAPIES SERIES
Discharge: HOME | End: 2018-12-10
Payer: COMMERCIAL

## 2018-12-10 PROCEDURE — 93798 PHYS/QHP OP CAR RHAB W/ECG: CPT

## 2018-12-12 ENCOUNTER — HOSPITAL ENCOUNTER (OUTPATIENT)
Dept: CARDIAC REHAB | Age: 68
Setting detail: THERAPIES SERIES
Discharge: HOME | End: 2018-12-12
Payer: COMMERCIAL

## 2018-12-12 PROCEDURE — 93798 PHYS/QHP OP CAR RHAB W/ECG: CPT

## 2018-12-13 ENCOUNTER — HOSPITAL ENCOUNTER (OUTPATIENT)
Dept: CARDIAC REHAB | Age: 68
Setting detail: THERAPIES SERIES
Discharge: HOME | End: 2018-12-13
Payer: COMMERCIAL

## 2018-12-13 PROCEDURE — 93798 PHYS/QHP OP CAR RHAB W/ECG: CPT

## 2018-12-17 ENCOUNTER — HOSPITAL ENCOUNTER (OUTPATIENT)
Dept: CARDIAC REHAB | Age: 68
Setting detail: THERAPIES SERIES
Discharge: HOME | End: 2018-12-17
Payer: COMMERCIAL

## 2018-12-17 PROCEDURE — 93798 PHYS/QHP OP CAR RHAB W/ECG: CPT

## 2018-12-19 ENCOUNTER — HOSPITAL ENCOUNTER (OUTPATIENT)
Dept: CARDIAC REHAB | Age: 68
Setting detail: THERAPIES SERIES
Discharge: HOME | End: 2018-12-19
Payer: COMMERCIAL

## 2018-12-19 PROCEDURE — 93798 PHYS/QHP OP CAR RHAB W/ECG: CPT

## 2018-12-20 ENCOUNTER — HOSPITAL ENCOUNTER (OUTPATIENT)
Dept: CARDIAC REHAB | Age: 68
Setting detail: THERAPIES SERIES
Discharge: HOME | End: 2018-12-20
Payer: COMMERCIAL

## 2018-12-20 PROCEDURE — 93798 PHYS/QHP OP CAR RHAB W/ECG: CPT

## 2018-12-24 ENCOUNTER — APPOINTMENT (OUTPATIENT)
Dept: CARDIAC REHAB | Age: 68
End: 2018-12-24
Payer: COMMERCIAL

## 2018-12-26 ENCOUNTER — HOSPITAL ENCOUNTER (OUTPATIENT)
Dept: CARDIAC REHAB | Age: 68
Setting detail: THERAPIES SERIES
Discharge: HOME | End: 2018-12-26
Payer: COMMERCIAL

## 2018-12-26 PROCEDURE — 93798 PHYS/QHP OP CAR RHAB W/ECG: CPT

## 2018-12-27 ENCOUNTER — HOSPITAL ENCOUNTER (OUTPATIENT)
Dept: CARDIAC REHAB | Age: 68
Setting detail: THERAPIES SERIES
Discharge: HOME | End: 2018-12-27
Payer: COMMERCIAL

## 2018-12-27 PROCEDURE — 93798 PHYS/QHP OP CAR RHAB W/ECG: CPT

## 2018-12-31 ENCOUNTER — HOSPITAL ENCOUNTER (OUTPATIENT)
Dept: CARDIAC REHAB | Age: 68
Setting detail: THERAPIES SERIES
Discharge: HOME | End: 2018-12-31
Payer: COMMERCIAL

## 2018-12-31 PROCEDURE — 93798 PHYS/QHP OP CAR RHAB W/ECG: CPT

## 2019-01-02 ENCOUNTER — HOSPITAL ENCOUNTER (OUTPATIENT)
Dept: CARDIAC REHAB | Age: 69
Setting detail: THERAPIES SERIES
Discharge: HOME | End: 2019-01-02
Payer: COMMERCIAL

## 2019-01-03 ENCOUNTER — HOSPITAL ENCOUNTER (OUTPATIENT)
Dept: CARDIAC REHAB | Age: 69
Setting detail: THERAPIES SERIES
Discharge: HOME | End: 2019-01-03
Payer: COMMERCIAL

## 2019-01-03 PROCEDURE — 93798 PHYS/QHP OP CAR RHAB W/ECG: CPT

## 2019-01-07 ENCOUNTER — HOSPITAL ENCOUNTER (OUTPATIENT)
Dept: CARDIAC REHAB | Age: 69
Setting detail: THERAPIES SERIES
Discharge: HOME | End: 2019-01-07
Payer: COMMERCIAL

## 2019-01-07 PROCEDURE — 93798 PHYS/QHP OP CAR RHAB W/ECG: CPT

## 2019-01-09 ENCOUNTER — OFFICE VISIT (OUTPATIENT)
Dept: CARDIOLOGY CLINIC | Age: 69
End: 2019-01-09
Payer: COMMERCIAL

## 2019-01-09 VITALS
OXYGEN SATURATION: 98 % | RESPIRATION RATE: 20 BRPM | WEIGHT: 205.4 LBS | DIASTOLIC BLOOD PRESSURE: 82 MMHG | HEIGHT: 70 IN | BODY MASS INDEX: 29.41 KG/M2 | SYSTOLIC BLOOD PRESSURE: 130 MMHG | HEART RATE: 78 BPM

## 2019-01-09 DIAGNOSIS — I25.10 CORONARY ARTERY DISEASE INVOLVING NATIVE CORONARY ARTERY OF NATIVE HEART WITHOUT ANGINA PECTORIS: Primary | ICD-10-CM

## 2019-01-09 DIAGNOSIS — I10 ESSENTIAL HYPERTENSION: ICD-10-CM

## 2019-01-09 DIAGNOSIS — I47.20 VENTRICULAR TACHYCARDIA: ICD-10-CM

## 2019-01-09 DIAGNOSIS — E78.5 HYPERLIPIDEMIA, UNSPECIFIED HYPERLIPIDEMIA TYPE: ICD-10-CM

## 2019-01-09 PROCEDURE — 99214 OFFICE O/P EST MOD 30 MIN: CPT | Performed by: INTERNAL MEDICINE

## 2019-01-09 RX ORDER — ISOSORBIDE MONONITRATE 30 MG/1
30 TABLET, EXTENDED RELEASE ORAL DAILY
Qty: 30 TABLET | Refills: 3 | Status: SHIPPED | OUTPATIENT
Start: 2019-01-09 | End: 2019-01-23 | Stop reason: ALTCHOICE

## 2019-01-11 ENCOUNTER — HOSPITAL ENCOUNTER (OUTPATIENT)
Dept: CARDIAC CATH/INVASIVE PROCEDURES | Age: 69
Discharge: HOME | End: 2019-01-12
Attending: INTERNAL MEDICINE | Admitting: INTERNAL MEDICINE
Payer: COMMERCIAL

## 2019-01-11 DIAGNOSIS — I25.10 CORONARY ARTERY DISEASE INVOLVING NATIVE CORONARY ARTERY OF NATIVE HEART WITHOUT ANGINA PECTORIS: ICD-10-CM

## 2019-01-11 DIAGNOSIS — I47.20 VENTRICULAR TACHYCARDIA: ICD-10-CM

## 2019-01-11 LAB
ANION GAP SERPL CALCULATED.3IONS-SCNC: 12 MEQ/L (ref 7–13)
BUN BLDV-MCNC: 20 MG/DL (ref 8–23)
CALCIUM SERPL-MCNC: 8.5 MG/DL (ref 8.6–10.2)
CHLORIDE BLD-SCNC: 103 MEQ/L (ref 98–107)
CO2: 22 MEQ/L (ref 22–29)
CREAT SERPL-MCNC: 0.79 MG/DL (ref 0.7–1.2)
EKG ATRIAL RATE: 68 BPM
EKG P AXIS: 45 DEGREES
EKG P-R INTERVAL: 144 MS
EKG Q-T INTERVAL: 392 MS
EKG QRS DURATION: 92 MS
EKG QTC CALCULATION (BAZETT): 416 MS
EKG R AXIS: 80 DEGREES
EKG T AXIS: 17 DEGREES
EKG VENTRICULAR RATE: 68 BPM
GFR AFRICAN AMERICAN: >60
GFR NON-AFRICAN AMERICAN: >60
GLUCOSE BLD-MCNC: 115 MG/DL (ref 74–109)
HCT VFR BLD CALC: 27.4 % (ref 42–52)
HEMOGLOBIN: 9.1 G/DL (ref 14–18)
INR BLD: 1
MCH RBC QN AUTO: 28.4 PG (ref 27–31.3)
MCHC RBC AUTO-ENTMCNC: 33.4 % (ref 33–37)
MCV RBC AUTO: 85 FL (ref 80–100)
PDW BLD-RTO: 21.2 % (ref 11.5–14.5)
PERFORMED ON: ABNORMAL
PLATELET # BLD: 244 K/UL (ref 130–400)
POC ACTIVATED CLOTTING TIME KAOLIN: 169 SEC (ref 82–152)
POC ACTIVATED CLOTTING TIME KAOLIN: 191 SEC (ref 82–152)
POC ACTIVATED CLOTTING TIME KAOLIN: 197 SEC (ref 82–152)
POC ACTIVATED CLOTTING TIME KAOLIN: 219 SEC (ref 82–152)
POC ACTIVATED CLOTTING TIME KAOLIN: 241 SEC (ref 82–152)
POC SAMPLE TYPE: ABNORMAL
POTASSIUM SERPL-SCNC: 4.2 MEQ/L (ref 3.5–5.1)
PROTHROMBIN TIME: 10 SEC (ref 9–11.5)
RBC # BLD: 3.23 M/UL (ref 4.7–6.1)
SODIUM BLD-SCNC: 137 MEQ/L (ref 132–144)
WBC # BLD: 8.1 K/UL (ref 4.8–10.8)

## 2019-01-11 PROCEDURE — 85347 COAGULATION TIME ACTIVATED: CPT

## 2019-01-11 PROCEDURE — C1894 INTRO/SHEATH, NON-LASER: HCPCS

## 2019-01-11 PROCEDURE — 2580000003 HC RX 258

## 2019-01-11 PROCEDURE — 80048 BASIC METABOLIC PNL TOTAL CA: CPT

## 2019-01-11 PROCEDURE — 93571 IV DOP VEL&/PRESS C FLO 1ST: CPT | Performed by: INTERNAL MEDICINE

## 2019-01-11 PROCEDURE — 6370000000 HC RX 637 (ALT 250 FOR IP)

## 2019-01-11 PROCEDURE — 92978 ENDOLUMINL IVUS OCT C 1ST: CPT | Performed by: INTERNAL MEDICINE

## 2019-01-11 PROCEDURE — 93010 ELECTROCARDIOGRAM REPORT: CPT | Performed by: INTERNAL MEDICINE

## 2019-01-11 PROCEDURE — C1725 CATH, TRANSLUMIN NON-LASER: HCPCS

## 2019-01-11 PROCEDURE — 85027 COMPLETE CBC AUTOMATED: CPT

## 2019-01-11 PROCEDURE — C1769 GUIDE WIRE: HCPCS

## 2019-01-11 PROCEDURE — C1887 CATHETER, GUIDING: HCPCS

## 2019-01-11 PROCEDURE — 6360000002 HC RX W HCPCS

## 2019-01-11 PROCEDURE — 2580000003 HC RX 258: Performed by: NURSE PRACTITIONER

## 2019-01-11 PROCEDURE — 93458 L HRT ARTERY/VENTRICLE ANGIO: CPT | Performed by: INTERNAL MEDICINE

## 2019-01-11 PROCEDURE — 6370000000 HC RX 637 (ALT 250 FOR IP): Performed by: NURSE PRACTITIONER

## 2019-01-11 PROCEDURE — 92928 PRQ TCAT PLMT NTRAC ST 1 LES: CPT | Performed by: INTERNAL MEDICINE

## 2019-01-11 PROCEDURE — C1874 STENT, COATED/COV W/DEL SYS: HCPCS

## 2019-01-11 PROCEDURE — 85610 PROTHROMBIN TIME: CPT

## 2019-01-11 PROCEDURE — 2500000003 HC RX 250 WO HCPCS

## 2019-01-11 PROCEDURE — 93005 ELECTROCARDIOGRAM TRACING: CPT

## 2019-01-11 PROCEDURE — C1753 CATH, INTRAVAS ULTRASOUND: HCPCS

## 2019-01-11 PROCEDURE — 2709999900 HC NON-CHARGEABLE SUPPLY

## 2019-01-11 RX ORDER — LISINOPRIL 10 MG/1
10 TABLET ORAL DAILY
Status: DISCONTINUED | OUTPATIENT
Start: 2019-01-11 | End: 2019-01-12 | Stop reason: HOSPADM

## 2019-01-11 RX ORDER — FOLIC ACID 1 MG/1
1 TABLET ORAL DAILY
Status: DISCONTINUED | OUTPATIENT
Start: 2019-01-11 | End: 2019-01-12 | Stop reason: HOSPADM

## 2019-01-11 RX ORDER — PANTOPRAZOLE SODIUM 40 MG/1
40 TABLET, DELAYED RELEASE ORAL
Status: DISCONTINUED | OUTPATIENT
Start: 2019-01-12 | End: 2019-01-11 | Stop reason: SDUPTHER

## 2019-01-11 RX ORDER — SODIUM CHLORIDE 0.9 % (FLUSH) 0.9 %
10 SYRINGE (ML) INJECTION PRN
Status: DISCONTINUED | OUTPATIENT
Start: 2019-01-11 | End: 2019-01-12 | Stop reason: HOSPADM

## 2019-01-11 RX ORDER — SODIUM CHLORIDE 9 MG/ML
INJECTION, SOLUTION INTRAVENOUS CONTINUOUS
Status: DISCONTINUED | OUTPATIENT
Start: 2019-01-11 | End: 2019-01-12 | Stop reason: HOSPADM

## 2019-01-11 RX ORDER — CLOPIDOGREL BISULFATE 75 MG/1
75 TABLET ORAL DAILY
Status: DISCONTINUED | OUTPATIENT
Start: 2019-01-11 | End: 2019-01-12 | Stop reason: HOSPADM

## 2019-01-11 RX ORDER — SODIUM CHLORIDE 0.9 % (FLUSH) 0.9 %
10 SYRINGE (ML) INJECTION PRN
Status: DISCONTINUED | OUTPATIENT
Start: 2019-01-11 | End: 2019-01-11

## 2019-01-11 RX ORDER — SODIUM CHLORIDE 0.9 % (FLUSH) 0.9 %
10 SYRINGE (ML) INJECTION EVERY 12 HOURS SCHEDULED
Status: DISCONTINUED | OUTPATIENT
Start: 2019-01-11 | End: 2019-01-11

## 2019-01-11 RX ORDER — ISOSORBIDE MONONITRATE 30 MG/1
30 TABLET, EXTENDED RELEASE ORAL DAILY
Status: DISCONTINUED | OUTPATIENT
Start: 2019-01-11 | End: 2019-01-12 | Stop reason: HOSPADM

## 2019-01-11 RX ORDER — METOPROLOL TARTRATE 50 MG/1
50 TABLET, FILM COATED ORAL 2 TIMES DAILY
Status: DISCONTINUED | OUTPATIENT
Start: 2019-01-11 | End: 2019-01-12 | Stop reason: HOSPADM

## 2019-01-11 RX ORDER — ATORVASTATIN CALCIUM 20 MG/1
20 TABLET, FILM COATED ORAL DAILY
Status: DISCONTINUED | OUTPATIENT
Start: 2019-01-11 | End: 2019-01-12 | Stop reason: HOSPADM

## 2019-01-11 RX ORDER — ESOMEPRAZOLE MAGNESIUM 40 MG/1
40 FOR SUSPENSION ORAL DAILY
Status: DISCONTINUED | OUTPATIENT
Start: 2019-01-11 | End: 2019-01-11 | Stop reason: CLARIF

## 2019-01-11 RX ORDER — PANTOPRAZOLE SODIUM 40 MG/1
40 TABLET, DELAYED RELEASE ORAL
Status: DISCONTINUED | OUTPATIENT
Start: 2019-01-12 | End: 2019-01-12 | Stop reason: HOSPADM

## 2019-01-11 RX ORDER — SODIUM CHLORIDE 0.9 % (FLUSH) 0.9 %
10 SYRINGE (ML) INJECTION EVERY 12 HOURS SCHEDULED
Status: DISCONTINUED | OUTPATIENT
Start: 2019-01-11 | End: 2019-01-12 | Stop reason: HOSPADM

## 2019-01-11 RX ORDER — PREDNISONE 1 MG/1
5 TABLET ORAL DAILY
Status: DISCONTINUED | OUTPATIENT
Start: 2019-01-11 | End: 2019-01-12 | Stop reason: HOSPADM

## 2019-01-11 RX ORDER — ASPIRIN 81 MG/1
81 TABLET, CHEWABLE ORAL DAILY
Status: DISCONTINUED | OUTPATIENT
Start: 2019-01-11 | End: 2019-01-12 | Stop reason: HOSPADM

## 2019-01-11 RX ORDER — SODIUM CHLORIDE 9 MG/ML
INJECTION, SOLUTION INTRAVENOUS CONTINUOUS
Status: DISCONTINUED | OUTPATIENT
Start: 2019-01-11 | End: 2019-01-11

## 2019-01-11 RX ORDER — ACETAMINOPHEN 325 MG/1
650 TABLET ORAL EVERY 4 HOURS PRN
Status: DISCONTINUED | OUTPATIENT
Start: 2019-01-11 | End: 2019-01-12 | Stop reason: HOSPADM

## 2019-01-11 RX ADMIN — METOPROLOL TARTRATE 50 MG: 50 TABLET ORAL at 20:38

## 2019-01-11 RX ADMIN — Medication 10 ML: at 20:41

## 2019-01-11 RX ADMIN — LISINOPRIL 10 MG: 10 TABLET ORAL at 15:34

## 2019-01-11 ASSESSMENT — PAIN SCALES - GENERAL
PAINLEVEL_OUTOF10: 0

## 2019-01-12 VITALS
HEART RATE: 78 BPM | TEMPERATURE: 98.5 F | DIASTOLIC BLOOD PRESSURE: 70 MMHG | RESPIRATION RATE: 20 BRPM | SYSTOLIC BLOOD PRESSURE: 137 MMHG | OXYGEN SATURATION: 98 %

## 2019-01-12 PROCEDURE — 6370000000 HC RX 637 (ALT 250 FOR IP): Performed by: NURSE PRACTITIONER

## 2019-01-12 PROCEDURE — 2580000003 HC RX 258: Performed by: NURSE PRACTITIONER

## 2019-01-12 PROCEDURE — 99024 POSTOP FOLLOW-UP VISIT: CPT | Performed by: INTERNAL MEDICINE

## 2019-01-12 RX ADMIN — ISOSORBIDE MONONITRATE 30 MG: 30 TABLET, EXTENDED RELEASE ORAL at 09:05

## 2019-01-12 RX ADMIN — CLOPIDOGREL BISULFATE 75 MG: 75 TABLET ORAL at 09:05

## 2019-01-12 RX ADMIN — LISINOPRIL 10 MG: 10 TABLET ORAL at 14:08

## 2019-01-12 RX ADMIN — Medication 10 ML: at 09:11

## 2019-01-12 RX ADMIN — PREDNISONE 5 MG: 5 TABLET ORAL at 09:06

## 2019-01-12 RX ADMIN — PANTOPRAZOLE SODIUM 40 MG: 40 TABLET, DELAYED RELEASE ORAL at 09:05

## 2019-01-12 RX ADMIN — ASPIRIN 81 MG 81 MG: 81 TABLET ORAL at 09:06

## 2019-01-12 RX ADMIN — FOLIC ACID 1 MG: 1 TABLET ORAL at 09:06

## 2019-01-12 RX ADMIN — METOPROLOL TARTRATE 50 MG: 50 TABLET ORAL at 09:05

## 2019-01-12 RX ADMIN — ATORVASTATIN CALCIUM 20 MG: 20 TABLET, FILM COATED ORAL at 09:06

## 2019-01-12 ASSESSMENT — PAIN SCALES - GENERAL
PAINLEVEL_OUTOF10: 0

## 2019-01-23 ENCOUNTER — APPOINTMENT (OUTPATIENT)
Dept: CARDIAC REHAB | Age: 69
End: 2019-01-23
Payer: COMMERCIAL

## 2019-01-23 ENCOUNTER — OFFICE VISIT (OUTPATIENT)
Dept: CARDIOLOGY CLINIC | Age: 69
End: 2019-01-23
Payer: COMMERCIAL

## 2019-01-23 VITALS
DIASTOLIC BLOOD PRESSURE: 64 MMHG | OXYGEN SATURATION: 96 % | SYSTOLIC BLOOD PRESSURE: 116 MMHG | BODY MASS INDEX: 29.35 KG/M2 | RESPIRATION RATE: 16 BRPM | HEART RATE: 96 BPM | WEIGHT: 205 LBS | HEIGHT: 70 IN

## 2019-01-23 DIAGNOSIS — I25.2 HISTORY OF NON-ST ELEVATION MYOCARDIAL INFARCTION (NSTEMI): Primary | ICD-10-CM

## 2019-01-23 DIAGNOSIS — I48.0 PAROXYSMAL ATRIAL FIBRILLATION WITH RVR (HCC): ICD-10-CM

## 2019-01-23 DIAGNOSIS — Z98.61 POST PTCA: ICD-10-CM

## 2019-01-23 DIAGNOSIS — I26.99 PE (PULMONARY THROMBOEMBOLISM) (HCC): ICD-10-CM

## 2019-01-23 DIAGNOSIS — E78.9 LIPID DISORDER: ICD-10-CM

## 2019-01-23 DIAGNOSIS — I47.20 VENTRICULAR TACHYCARDIA: ICD-10-CM

## 2019-01-23 DIAGNOSIS — I10 ESSENTIAL HYPERTENSION: ICD-10-CM

## 2019-01-23 PROCEDURE — 99214 OFFICE O/P EST MOD 30 MIN: CPT | Performed by: INTERNAL MEDICINE

## 2019-01-31 ENCOUNTER — HOSPITAL ENCOUNTER (OUTPATIENT)
Dept: CARDIAC CATH/INVASIVE PROCEDURES | Age: 69
Discharge: HOME | End: 2019-01-31
Attending: INTERNAL MEDICINE | Admitting: INTERNAL MEDICINE
Payer: COMMERCIAL

## 2019-01-31 ENCOUNTER — APPOINTMENT (OUTPATIENT)
Dept: CARDIAC REHAB | Age: 69
End: 2019-01-31
Payer: COMMERCIAL

## 2019-01-31 VITALS
HEART RATE: 68 BPM | HEIGHT: 70 IN | DIASTOLIC BLOOD PRESSURE: 62 MMHG | BODY MASS INDEX: 29.2 KG/M2 | OXYGEN SATURATION: 96 % | RESPIRATION RATE: 13 BRPM | SYSTOLIC BLOOD PRESSURE: 132 MMHG | WEIGHT: 204 LBS

## 2019-01-31 DIAGNOSIS — Z98.61 POST PTCA: ICD-10-CM

## 2019-01-31 LAB
ANION GAP SERPL CALCULATED.3IONS-SCNC: 13 MEQ/L (ref 7–13)
BUN BLDV-MCNC: 16 MG/DL (ref 8–23)
CALCIUM SERPL-MCNC: 8.8 MG/DL (ref 8.6–10.2)
CHLORIDE BLD-SCNC: 102 MEQ/L (ref 98–107)
CO2: 22 MEQ/L (ref 22–29)
CREAT SERPL-MCNC: 0.86 MG/DL (ref 0.7–1.2)
GFR AFRICAN AMERICAN: >60
GFR NON-AFRICAN AMERICAN: >60
GLUCOSE BLD-MCNC: 104 MG/DL (ref 74–109)
HCT VFR BLD CALC: 26.9 % (ref 42–52)
HEMOGLOBIN: 8.8 G/DL (ref 14–18)
MCH RBC QN AUTO: 26.4 PG (ref 27–31.3)
MCHC RBC AUTO-ENTMCNC: 32.6 % (ref 33–37)
MCV RBC AUTO: 81.2 FL (ref 80–100)
PDW BLD-RTO: 23.4 % (ref 11.5–14.5)
PLATELET # BLD: 239 K/UL (ref 130–400)
POTASSIUM REFLEX MAGNESIUM: 4.1 MEQ/L (ref 3.5–5.1)
RBC # BLD: 3.31 M/UL (ref 4.7–6.1)
SODIUM BLD-SCNC: 137 MEQ/L (ref 132–144)
WBC # BLD: 6.8 K/UL (ref 4.8–10.8)

## 2019-01-31 PROCEDURE — 85027 COMPLETE CBC AUTOMATED: CPT

## 2019-01-31 PROCEDURE — 6360000002 HC RX W HCPCS

## 2019-01-31 PROCEDURE — 92928 PRQ TCAT PLMT NTRAC ST 1 LES: CPT | Performed by: INTERNAL MEDICINE

## 2019-01-31 PROCEDURE — 93571 IV DOP VEL&/PRESS C FLO 1ST: CPT | Performed by: INTERNAL MEDICINE

## 2019-01-31 PROCEDURE — 85347 COAGULATION TIME ACTIVATED: CPT

## 2019-01-31 PROCEDURE — C1725 CATH, TRANSLUMIN NON-LASER: HCPCS

## 2019-01-31 PROCEDURE — 2709999900 HC NON-CHARGEABLE SUPPLY

## 2019-01-31 PROCEDURE — C1769 GUIDE WIRE: HCPCS

## 2019-01-31 PROCEDURE — 2500000003 HC RX 250 WO HCPCS

## 2019-01-31 PROCEDURE — 80048 BASIC METABOLIC PNL TOTAL CA: CPT

## 2019-01-31 PROCEDURE — C1887 CATHETER, GUIDING: HCPCS

## 2019-01-31 PROCEDURE — C1874 STENT, COATED/COV W/DEL SYS: HCPCS

## 2019-01-31 PROCEDURE — C1894 INTRO/SHEATH, NON-LASER: HCPCS

## 2019-01-31 PROCEDURE — 2580000003 HC RX 258

## 2019-01-31 PROCEDURE — 6370000000 HC RX 637 (ALT 250 FOR IP)

## 2019-01-31 RX ORDER — ONDANSETRON 2 MG/ML
4 INJECTION INTRAMUSCULAR; INTRAVENOUS EVERY 6 HOURS PRN
Status: DISCONTINUED | OUTPATIENT
Start: 2019-01-31 | End: 2019-01-31 | Stop reason: HOSPADM

## 2019-01-31 RX ORDER — SODIUM CHLORIDE 9 MG/ML
INJECTION, SOLUTION INTRAVENOUS CONTINUOUS
Status: DISCONTINUED | OUTPATIENT
Start: 2019-01-31 | End: 2019-01-31 | Stop reason: HOSPADM

## 2019-01-31 RX ORDER — SODIUM CHLORIDE 9 MG/ML
INJECTION, SOLUTION INTRAVENOUS CONTINUOUS
Status: DISCONTINUED | OUTPATIENT
Start: 2019-01-31 | End: 2019-01-31

## 2019-02-01 LAB
PERFORMED ON: ABNORMAL
POC ACTIVATED CLOTTING TIME KAOLIN: 164 SEC (ref 82–152)
POC SAMPLE TYPE: ABNORMAL

## 2019-02-11 ENCOUNTER — APPOINTMENT (OUTPATIENT)
Dept: CARDIAC REHAB | Age: 69
End: 2019-02-11
Payer: COMMERCIAL

## 2019-02-13 ENCOUNTER — OFFICE VISIT (OUTPATIENT)
Dept: CARDIOLOGY CLINIC | Age: 69
End: 2019-02-13
Payer: COMMERCIAL

## 2019-02-13 VITALS
HEIGHT: 70 IN | WEIGHT: 209 LBS | DIASTOLIC BLOOD PRESSURE: 62 MMHG | OXYGEN SATURATION: 96 % | RESPIRATION RATE: 16 BRPM | SYSTOLIC BLOOD PRESSURE: 130 MMHG | BODY MASS INDEX: 29.92 KG/M2 | HEART RATE: 42 BPM

## 2019-02-13 DIAGNOSIS — E78.9 LIPID DISORDER: ICD-10-CM

## 2019-02-13 DIAGNOSIS — I49.3 PVC (PREMATURE VENTRICULAR CONTRACTION): Primary | ICD-10-CM

## 2019-02-13 DIAGNOSIS — Z98.61 POST PTCA: ICD-10-CM

## 2019-02-13 DIAGNOSIS — I48.0 PAROXYSMAL ATRIAL FIBRILLATION WITH RVR (HCC): ICD-10-CM

## 2019-02-13 DIAGNOSIS — I47.20 VENTRICULAR TACHYCARDIA: ICD-10-CM

## 2019-02-13 DIAGNOSIS — I10 ESSENTIAL HYPERTENSION: ICD-10-CM

## 2019-02-13 PROCEDURE — 99213 OFFICE O/P EST LOW 20 MIN: CPT | Performed by: INTERNAL MEDICINE

## 2019-02-13 PROCEDURE — 93000 ELECTROCARDIOGRAM COMPLETE: CPT | Performed by: INTERNAL MEDICINE

## 2019-02-15 DIAGNOSIS — R07.89 OTHER CHEST PAIN: Primary | ICD-10-CM

## 2019-02-15 DIAGNOSIS — I25.118 CORONARY ARTERY DISEASE INVOLVING NATIVE CORONARY ARTERY OF NATIVE HEART WITH OTHER FORM OF ANGINA PECTORIS (HCC): ICD-10-CM

## 2019-02-18 ENCOUNTER — HOSPITAL ENCOUNTER (OUTPATIENT)
Dept: CARDIAC REHAB | Age: 69
Setting detail: THERAPIES SERIES
Discharge: HOME | End: 2019-02-18
Payer: COMMERCIAL

## 2019-02-18 PROCEDURE — 93798 PHYS/QHP OP CAR RHAB W/ECG: CPT

## 2019-02-20 ENCOUNTER — HOSPITAL ENCOUNTER (OUTPATIENT)
Dept: CARDIAC REHAB | Age: 69
Setting detail: THERAPIES SERIES
Discharge: HOME | End: 2019-02-20
Payer: COMMERCIAL

## 2019-02-20 PROCEDURE — 93798 PHYS/QHP OP CAR RHAB W/ECG: CPT

## 2019-02-21 ENCOUNTER — HOSPITAL ENCOUNTER (OUTPATIENT)
Dept: CARDIAC REHAB | Age: 69
Setting detail: THERAPIES SERIES
Discharge: HOME | End: 2019-02-21
Payer: COMMERCIAL

## 2019-02-21 PROCEDURE — 93798 PHYS/QHP OP CAR RHAB W/ECG: CPT

## 2019-02-25 ENCOUNTER — HOSPITAL ENCOUNTER (OUTPATIENT)
Dept: CARDIAC REHAB | Age: 69
Setting detail: THERAPIES SERIES
Discharge: HOME | End: 2019-02-25
Payer: COMMERCIAL

## 2019-02-25 PROCEDURE — 93798 PHYS/QHP OP CAR RHAB W/ECG: CPT

## 2019-02-26 RX ORDER — ATORVASTATIN CALCIUM 80 MG/1
80 TABLET, FILM COATED ORAL
COMMUNITY
Start: 2019-02-18 | End: 2019-08-17

## 2019-02-27 ENCOUNTER — HOSPITAL ENCOUNTER (EMERGENCY)
Age: 69
Discharge: HOME | End: 2019-02-27
Attending: EMERGENCY MEDICINE
Payer: COMMERCIAL

## 2019-02-27 ENCOUNTER — APPOINTMENT (OUTPATIENT)
Dept: CARDIAC REHAB | Age: 69
End: 2019-02-27
Payer: COMMERCIAL

## 2019-02-27 ENCOUNTER — APPOINTMENT (OUTPATIENT)
Dept: GENERAL RADIOLOGY | Age: 69
End: 2019-02-27
Payer: COMMERCIAL

## 2019-02-27 VITALS
OXYGEN SATURATION: 100 % | SYSTOLIC BLOOD PRESSURE: 143 MMHG | DIASTOLIC BLOOD PRESSURE: 81 MMHG | TEMPERATURE: 98 F | WEIGHT: 200 LBS | HEART RATE: 81 BPM | HEIGHT: 70 IN | BODY MASS INDEX: 28.63 KG/M2 | RESPIRATION RATE: 16 BRPM

## 2019-02-27 DIAGNOSIS — R00.0 TACHYCARDIA: Primary | ICD-10-CM

## 2019-02-27 LAB
ALBUMIN SERPL-MCNC: 4.1 G/DL (ref 3.5–4.6)
ALP BLD-CCNC: 44 U/L (ref 35–104)
ALT SERPL-CCNC: 26 U/L (ref 0–41)
ANION GAP SERPL CALCULATED.3IONS-SCNC: 10 MEQ/L (ref 9–15)
ANISOCYTOSIS: ABNORMAL
AST SERPL-CCNC: 24 U/L (ref 0–40)
BASOPHILS ABSOLUTE: 0.1 K/UL (ref 0–0.2)
BASOPHILS RELATIVE PERCENT: 1.5 %
BILIRUB SERPL-MCNC: <0.2 MG/DL (ref 0.2–0.7)
BUN BLDV-MCNC: 19 MG/DL (ref 8–23)
CALCIUM SERPL-MCNC: 8.7 MG/DL (ref 8.5–9.9)
CHLORIDE BLD-SCNC: 100 MEQ/L (ref 95–107)
CO2: 24 MEQ/L (ref 20–31)
CREAT SERPL-MCNC: 0.79 MG/DL (ref 0.7–1.2)
EKG ATRIAL RATE: 76 BPM
EKG P AXIS: 25 DEGREES
EKG P-R INTERVAL: 152 MS
EKG Q-T INTERVAL: 352 MS
EKG QRS DURATION: 84 MS
EKG QTC CALCULATION (BAZETT): 396 MS
EKG R AXIS: 71 DEGREES
EKG T AXIS: 24 DEGREES
EKG VENTRICULAR RATE: 76 BPM
EOSINOPHILS ABSOLUTE: 0.1 K/UL (ref 0–0.7)
EOSINOPHILS RELATIVE PERCENT: 2.4 %
GFR AFRICAN AMERICAN: >60
GFR NON-AFRICAN AMERICAN: >60
GLOBULIN: 2.8 G/DL (ref 2.3–3.5)
GLUCOSE BLD-MCNC: 95 MG/DL (ref 70–99)
HCT VFR BLD CALC: 24.7 % (ref 42–52)
HEMOGLOBIN: 7.9 G/DL (ref 14–18)
HYPOCHROMIA: ABNORMAL
LYMPHOCYTES ABSOLUTE: 1.4 K/UL (ref 1–4.8)
LYMPHOCYTES RELATIVE PERCENT: 27.1 %
MAGNESIUM: 1.8 MG/DL (ref 1.7–2.4)
MCH RBC QN AUTO: 23.7 PG (ref 27–31.3)
MCHC RBC AUTO-ENTMCNC: 31.9 % (ref 33–37)
MCV RBC AUTO: 74.3 FL (ref 80–100)
MICROCYTES: ABNORMAL
MONOCYTES ABSOLUTE: 0.7 K/UL (ref 0.2–0.8)
MONOCYTES RELATIVE PERCENT: 14.8 %
NEUTROPHILS ABSOLUTE: 2.7 K/UL (ref 1.4–6.5)
NEUTROPHILS RELATIVE PERCENT: 54.2 %
OVALOCYTES: ABNORMAL
PDW BLD-RTO: 24.2 % (ref 11.5–14.5)
PLATELET # BLD: 195 K/UL (ref 130–400)
POIKILOCYTES: ABNORMAL
POTASSIUM SERPL-SCNC: 3.9 MEQ/L (ref 3.4–4.9)
RBC # BLD: 3.32 M/UL (ref 4.7–6.1)
SLIDE REVIEW: ABNORMAL
SODIUM BLD-SCNC: 134 MEQ/L (ref 135–144)
TEAR DROP CELLS: ABNORMAL
TOTAL PROTEIN: 6.9 G/DL (ref 6.3–8)
TROPONIN: <0.01 NG/ML (ref 0–0.01)
WBC # BLD: 5.1 K/UL (ref 4.8–10.8)

## 2019-02-27 PROCEDURE — 84484 ASSAY OF TROPONIN QUANT: CPT

## 2019-02-27 PROCEDURE — 83735 ASSAY OF MAGNESIUM: CPT

## 2019-02-27 PROCEDURE — 36415 COLL VENOUS BLD VENIPUNCTURE: CPT

## 2019-02-27 PROCEDURE — 80053 COMPREHEN METABOLIC PANEL: CPT

## 2019-02-27 PROCEDURE — 99285 EMERGENCY DEPT VISIT HI MDM: CPT

## 2019-02-27 PROCEDURE — 71045 X-RAY EXAM CHEST 1 VIEW: CPT

## 2019-02-27 PROCEDURE — 85025 COMPLETE CBC W/AUTO DIFF WBC: CPT

## 2019-02-27 PROCEDURE — 93005 ELECTROCARDIOGRAM TRACING: CPT

## 2019-02-27 RX ORDER — METOPROLOL TARTRATE 50 MG/1
75 TABLET, FILM COATED ORAL 2 TIMES DAILY
Qty: 60 TABLET | Refills: 0 | Status: SHIPPED | OUTPATIENT
Start: 2019-02-27 | End: 2019-05-22 | Stop reason: SDUPTHER

## 2019-02-27 ASSESSMENT — ENCOUNTER SYMPTOMS
VOMITING: 0
SHORTNESS OF BREATH: 0
NAUSEA: 0
ABDOMINAL PAIN: 0
COUGH: 0
BACK PAIN: 0
DIARRHEA: 0
SORE THROAT: 0

## 2019-02-28 ENCOUNTER — HOSPITAL ENCOUNTER (OUTPATIENT)
Dept: CARDIAC REHAB | Age: 69
Setting detail: THERAPIES SERIES
Discharge: HOME | End: 2019-02-28
Payer: COMMERCIAL

## 2019-02-28 PROCEDURE — 93798 PHYS/QHP OP CAR RHAB W/ECG: CPT

## 2019-03-01 ENCOUNTER — HOSPITAL ENCOUNTER (OUTPATIENT)
Dept: NON INVASIVE DIAGNOSTICS | Age: 69
Discharge: HOME | End: 2019-03-01
Payer: COMMERCIAL

## 2019-03-01 DIAGNOSIS — I49.3 PVC (PREMATURE VENTRICULAR CONTRACTION): ICD-10-CM

## 2019-03-01 PROCEDURE — 93010 ELECTROCARDIOGRAM REPORT: CPT | Performed by: INTERNAL MEDICINE

## 2019-03-01 PROCEDURE — 93225 XTRNL ECG REC<48 HRS REC: CPT

## 2019-03-01 PROCEDURE — 93226 XTRNL ECG REC<48 HR SCAN A/R: CPT

## 2019-03-06 ENCOUNTER — HOSPITAL ENCOUNTER (OUTPATIENT)
Dept: CARDIAC REHAB | Age: 69
Setting detail: THERAPIES SERIES
Discharge: HOME | End: 2019-03-06
Payer: COMMERCIAL

## 2019-03-06 PROCEDURE — 93798 PHYS/QHP OP CAR RHAB W/ECG: CPT

## 2019-03-07 ENCOUNTER — HOSPITAL ENCOUNTER (OUTPATIENT)
Dept: CARDIAC REHAB | Age: 69
Setting detail: THERAPIES SERIES
Discharge: HOME | End: 2019-03-07
Payer: COMMERCIAL

## 2019-03-11 ENCOUNTER — HOSPITAL ENCOUNTER (OUTPATIENT)
Dept: CARDIAC REHAB | Age: 69
Setting detail: THERAPIES SERIES
Discharge: HOME | End: 2019-03-11
Payer: COMMERCIAL

## 2019-03-11 PROCEDURE — 93798 PHYS/QHP OP CAR RHAB W/ECG: CPT

## 2019-03-13 ENCOUNTER — OFFICE VISIT (OUTPATIENT)
Dept: CARDIOLOGY CLINIC | Age: 69
End: 2019-03-13
Payer: COMMERCIAL

## 2019-03-13 ENCOUNTER — HOSPITAL ENCOUNTER (OUTPATIENT)
Dept: CARDIAC REHAB | Age: 69
Setting detail: THERAPIES SERIES
Discharge: HOME | End: 2019-03-13
Payer: COMMERCIAL

## 2019-03-13 VITALS
OXYGEN SATURATION: 95 % | BODY MASS INDEX: 29.2 KG/M2 | SYSTOLIC BLOOD PRESSURE: 118 MMHG | HEART RATE: 66 BPM | RESPIRATION RATE: 16 BRPM | WEIGHT: 204 LBS | DIASTOLIC BLOOD PRESSURE: 62 MMHG | HEIGHT: 70 IN

## 2019-03-13 DIAGNOSIS — E78.9 LIPID DISORDER: ICD-10-CM

## 2019-03-13 DIAGNOSIS — I25.118 CORONARY ARTERY DISEASE INVOLVING NATIVE CORONARY ARTERY OF NATIVE HEART WITH OTHER FORM OF ANGINA PECTORIS (HCC): Primary | ICD-10-CM

## 2019-03-13 DIAGNOSIS — I47.20 VENTRICULAR TACHYCARDIA: ICD-10-CM

## 2019-03-13 DIAGNOSIS — R09.89 RIGHT CAROTID BRUIT: ICD-10-CM

## 2019-03-13 DIAGNOSIS — I48.0 PAROXYSMAL ATRIAL FIBRILLATION WITH RVR (HCC): ICD-10-CM

## 2019-03-13 DIAGNOSIS — I10 ESSENTIAL HYPERTENSION: ICD-10-CM

## 2019-03-13 DIAGNOSIS — I47.1 SVT (SUPRAVENTRICULAR TACHYCARDIA) (HCC): ICD-10-CM

## 2019-03-13 PROCEDURE — 93798 PHYS/QHP OP CAR RHAB W/ECG: CPT

## 2019-03-13 PROCEDURE — 99213 OFFICE O/P EST LOW 20 MIN: CPT | Performed by: INTERNAL MEDICINE

## 2019-03-13 RX ORDER — EZETIMIBE 10 MG/1
10 TABLET ORAL DAILY
COMMUNITY

## 2019-03-14 ENCOUNTER — HOSPITAL ENCOUNTER (OUTPATIENT)
Dept: CARDIAC REHAB | Age: 69
Setting detail: THERAPIES SERIES
Discharge: HOME | End: 2019-03-14
Payer: COMMERCIAL

## 2019-03-18 ENCOUNTER — HOSPITAL ENCOUNTER (OUTPATIENT)
Dept: CARDIAC REHAB | Age: 69
Setting detail: THERAPIES SERIES
Discharge: HOME | End: 2019-03-18
Payer: COMMERCIAL

## 2019-03-18 PROCEDURE — 93798 PHYS/QHP OP CAR RHAB W/ECG: CPT

## 2019-03-19 ENCOUNTER — HOSPITAL ENCOUNTER (OUTPATIENT)
Dept: ULTRASOUND IMAGING | Age: 69
Discharge: HOME | End: 2019-03-21
Payer: COMMERCIAL

## 2019-03-19 DIAGNOSIS — R09.89 RIGHT CAROTID BRUIT: ICD-10-CM

## 2019-03-19 PROCEDURE — 93880 EXTRACRANIAL BILAT STUDY: CPT

## 2019-03-20 ENCOUNTER — HOSPITAL ENCOUNTER (OUTPATIENT)
Dept: CARDIAC REHAB | Age: 69
Setting detail: THERAPIES SERIES
Discharge: HOME | End: 2019-03-20
Payer: COMMERCIAL

## 2019-03-20 PROCEDURE — 93798 PHYS/QHP OP CAR RHAB W/ECG: CPT

## 2019-03-21 ENCOUNTER — HOSPITAL ENCOUNTER (OUTPATIENT)
Dept: CARDIAC REHAB | Age: 69
Setting detail: THERAPIES SERIES
Discharge: HOME | End: 2019-03-21
Payer: COMMERCIAL

## 2019-03-25 ENCOUNTER — HOSPITAL ENCOUNTER (OUTPATIENT)
Dept: CARDIAC REHAB | Age: 69
Setting detail: THERAPIES SERIES
Discharge: HOME | End: 2019-03-25
Payer: COMMERCIAL

## 2019-03-25 PROCEDURE — 93798 PHYS/QHP OP CAR RHAB W/ECG: CPT

## 2019-03-27 ENCOUNTER — HOSPITAL ENCOUNTER (OUTPATIENT)
Dept: CARDIAC REHAB | Age: 69
Setting detail: THERAPIES SERIES
Discharge: HOME | End: 2019-03-27
Payer: COMMERCIAL

## 2019-03-27 PROCEDURE — 93798 PHYS/QHP OP CAR RHAB W/ECG: CPT

## 2019-03-28 ENCOUNTER — HOSPITAL ENCOUNTER (OUTPATIENT)
Dept: CARDIAC REHAB | Age: 69
Setting detail: THERAPIES SERIES
Discharge: HOME | End: 2019-03-28
Payer: COMMERCIAL

## 2019-03-28 PROCEDURE — 93798 PHYS/QHP OP CAR RHAB W/ECG: CPT

## 2019-04-01 ENCOUNTER — HOSPITAL ENCOUNTER (OUTPATIENT)
Dept: CARDIAC REHAB | Age: 69
Setting detail: THERAPIES SERIES
Discharge: HOME | End: 2019-04-01
Payer: COMMERCIAL

## 2019-04-01 PROCEDURE — 93798 PHYS/QHP OP CAR RHAB W/ECG: CPT

## 2019-04-02 ENCOUNTER — HOSPITAL ENCOUNTER (INPATIENT)
Age: 69
LOS: 2 days | Discharge: HOME | DRG: 813 | End: 2019-04-04
Attending: EMERGENCY MEDICINE | Admitting: INTERNAL MEDICINE
Payer: COMMERCIAL

## 2019-04-02 ENCOUNTER — APPOINTMENT (OUTPATIENT)
Dept: GENERAL RADIOLOGY | Age: 69
DRG: 813 | End: 2019-04-02
Payer: COMMERCIAL

## 2019-04-02 DIAGNOSIS — K92.2 GASTROINTESTINAL HEMORRHAGE, UNSPECIFIED GASTROINTESTINAL HEMORRHAGE TYPE: ICD-10-CM

## 2019-04-02 DIAGNOSIS — D62 ANEMIA DUE TO ACUTE BLOOD LOSS: Primary | ICD-10-CM

## 2019-04-02 LAB
ABO/RH: NORMAL
ACANTHOCYTES: ABNORMAL
ALBUMIN SERPL-MCNC: 4.3 G/DL (ref 3.5–4.6)
ALP BLD-CCNC: 38 U/L (ref 35–104)
ALT SERPL-CCNC: 22 U/L (ref 0–41)
ANION GAP SERPL CALCULATED.3IONS-SCNC: 14 MEQ/L (ref 9–15)
ANISOCYTOSIS: ABNORMAL
ANTIBODY SCREEN: NORMAL
AST SERPL-CCNC: 24 U/L (ref 0–40)
BASOPHILS ABSOLUTE: 0.1 K/UL (ref 0–0.2)
BASOPHILS RELATIVE PERCENT: 0.9 %
BILIRUB SERPL-MCNC: 0.5 MG/DL (ref 0.2–0.7)
BLOOD BANK DISPENSE STATUS: NORMAL
BLOOD BANK DISPENSE STATUS: NORMAL
BLOOD BANK PRODUCT CODE: NORMAL
BLOOD BANK PRODUCT CODE: NORMAL
BPU ID: NORMAL
BPU ID: NORMAL
BUN BLDV-MCNC: 22 MG/DL (ref 8–23)
CALCIUM SERPL-MCNC: 8.9 MG/DL (ref 8.5–9.9)
CHLORIDE BLD-SCNC: 96 MEQ/L (ref 95–107)
CO2: 20 MEQ/L (ref 20–31)
CREAT SERPL-MCNC: 0.92 MG/DL (ref 0.7–1.2)
DESCRIPTION BLOOD BANK: NORMAL
DESCRIPTION BLOOD BANK: NORMAL
EOSINOPHILS ABSOLUTE: 0.1 K/UL (ref 0–0.7)
EOSINOPHILS RELATIVE PERCENT: 1.5 %
FERRITIN: 17.3 NG/ML (ref 30–400)
FOLATE: >20 NG/ML (ref 7.3–26.1)
GFR AFRICAN AMERICAN: >60
GFR NON-AFRICAN AMERICAN: >60
GLOBULIN: 2.3 G/DL (ref 2.3–3.5)
GLUCOSE BLD-MCNC: 109 MG/DL (ref 70–99)
HCT VFR BLD CALC: 20.7 % (ref 42–52)
HCT VFR BLD CALC: 22.1 % (ref 42–52)
HEMOGLOBIN: 6.5 G/DL (ref 14–18)
HEMOGLOBIN: 6.9 G/DL (ref 14–18)
INR BLD: 1.2
IRON SATURATION: 14 % (ref 11–46)
IRON: 57 UG/DL (ref 59–158)
LV EF: 55 %
LVEF MODALITY: NORMAL
LYMPHOCYTES ABSOLUTE: 1 K/UL (ref 1–4.8)
LYMPHOCYTES RELATIVE PERCENT: 15.9 %
MCH RBC QN AUTO: 22.7 PG (ref 27–31.3)
MCHC RBC AUTO-ENTMCNC: 31.2 % (ref 33–37)
MCV RBC AUTO: 72.9 FL (ref 80–100)
MONOCYTES ABSOLUTE: 0.5 K/UL (ref 0.2–0.8)
MONOCYTES RELATIVE PERCENT: 8.4 %
NEUTROPHILS ABSOLUTE: 4.8 K/UL (ref 1.4–6.5)
NEUTROPHILS RELATIVE PERCENT: 73.3 %
OVALOCYTES: ABNORMAL
PDW BLD-RTO: 25.5 % (ref 11.5–14.5)
PLATELET # BLD: 206 K/UL (ref 130–400)
POIKILOCYTES: ABNORMAL
POTASSIUM SERPL-SCNC: 4.4 MEQ/L (ref 3.4–4.9)
PROTHROMBIN TIME: 12.4 SEC (ref 9–11.5)
RBC # BLD: 2.84 M/UL (ref 4.7–6.1)
SLIDE REVIEW: ABNORMAL
SODIUM BLD-SCNC: 130 MEQ/L (ref 135–144)
TEAR DROP CELLS: ABNORMAL
TOTAL IRON BINDING CAPACITY: 414 UG/DL (ref 178–450)
TOTAL PROTEIN: 6.6 G/DL (ref 6.3–8)
TROPONIN: <0.01 NG/ML (ref 0–0.01)
VITAMIN B-12: 451 PG/ML (ref 232–1245)
WBC # BLD: 6.5 K/UL (ref 4.8–10.8)

## 2019-04-02 PROCEDURE — 82607 VITAMIN B-12: CPT

## 2019-04-02 PROCEDURE — 99222 1ST HOSP IP/OBS MODERATE 55: CPT | Performed by: INTERNAL MEDICINE

## 2019-04-02 PROCEDURE — 86900 BLOOD TYPING SEROLOGIC ABO: CPT

## 2019-04-02 PROCEDURE — C9113 INJ PANTOPRAZOLE SODIUM, VIA: HCPCS | Performed by: PHYSICIAN ASSISTANT

## 2019-04-02 PROCEDURE — 93005 ELECTROCARDIOGRAM TRACING: CPT

## 2019-04-02 PROCEDURE — 93306 TTE W/DOPPLER COMPLETE: CPT

## 2019-04-02 PROCEDURE — 2580000003 HC RX 258: Performed by: SPECIALIST

## 2019-04-02 PROCEDURE — 85025 COMPLETE CBC W/AUTO DIFF WBC: CPT

## 2019-04-02 PROCEDURE — 2580000003 HC RX 258: Performed by: EMERGENCY MEDICINE

## 2019-04-02 PROCEDURE — 82728 ASSAY OF FERRITIN: CPT

## 2019-04-02 PROCEDURE — 85014 HEMATOCRIT: CPT

## 2019-04-02 PROCEDURE — 2580000003 HC RX 258: Performed by: PHYSICIAN ASSISTANT

## 2019-04-02 PROCEDURE — 84484 ASSAY OF TROPONIN QUANT: CPT

## 2019-04-02 PROCEDURE — 1210000000 HC MED SURG R&B

## 2019-04-02 PROCEDURE — 71045 X-RAY EXAM CHEST 1 VIEW: CPT

## 2019-04-02 PROCEDURE — 86901 BLOOD TYPING SEROLOGIC RH(D): CPT

## 2019-04-02 PROCEDURE — 99285 EMERGENCY DEPT VISIT HI MDM: CPT

## 2019-04-02 PROCEDURE — 6370000000 HC RX 637 (ALT 250 FOR IP): Performed by: PHYSICIAN ASSISTANT

## 2019-04-02 PROCEDURE — 83550 IRON BINDING TEST: CPT

## 2019-04-02 PROCEDURE — 80053 COMPREHEN METABOLIC PANEL: CPT

## 2019-04-02 PROCEDURE — 85018 HEMOGLOBIN: CPT

## 2019-04-02 PROCEDURE — 85610 PROTHROMBIN TIME: CPT

## 2019-04-02 PROCEDURE — P9016 RBC LEUKOCYTES REDUCED: HCPCS

## 2019-04-02 PROCEDURE — 6360000002 HC RX W HCPCS: Performed by: PHYSICIAN ASSISTANT

## 2019-04-02 PROCEDURE — 6370000000 HC RX 637 (ALT 250 FOR IP): Performed by: INTERNAL MEDICINE

## 2019-04-02 PROCEDURE — 36415 COLL VENOUS BLD VENIPUNCTURE: CPT

## 2019-04-02 PROCEDURE — 36430 TRANSFUSION BLD/BLD COMPNT: CPT

## 2019-04-02 PROCEDURE — 82746 ASSAY OF FOLIC ACID SERUM: CPT

## 2019-04-02 PROCEDURE — 83540 ASSAY OF IRON: CPT

## 2019-04-02 PROCEDURE — 99222 1ST HOSP IP/OBS MODERATE 55: CPT | Performed by: SPECIALIST

## 2019-04-02 PROCEDURE — 2500000003 HC RX 250 WO HCPCS: Performed by: INTERNAL MEDICINE

## 2019-04-02 PROCEDURE — 86850 RBC ANTIBODY SCREEN: CPT

## 2019-04-02 PROCEDURE — 86923 COMPATIBILITY TEST ELECTRIC: CPT

## 2019-04-02 RX ORDER — ACETAMINOPHEN 325 MG/1
650 TABLET ORAL EVERY 4 HOURS PRN
Status: DISCONTINUED | OUTPATIENT
Start: 2019-04-02 | End: 2019-04-04 | Stop reason: HOSPADM

## 2019-04-02 RX ORDER — 0.9 % SODIUM CHLORIDE 0.9 %
10 VIAL (ML) INJECTION EVERY 12 HOURS
Status: DISCONTINUED | OUTPATIENT
Start: 2019-04-02 | End: 2019-04-04 | Stop reason: HOSPADM

## 2019-04-02 RX ORDER — ATORVASTATIN CALCIUM 80 MG/1
80 TABLET, FILM COATED ORAL NIGHTLY
Status: DISCONTINUED | OUTPATIENT
Start: 2019-04-02 | End: 2019-04-04 | Stop reason: HOSPADM

## 2019-04-02 RX ORDER — 0.9 % SODIUM CHLORIDE 0.9 %
500 INTRAVENOUS SOLUTION INTRAVENOUS ONCE
Status: COMPLETED | OUTPATIENT
Start: 2019-04-02 | End: 2019-04-02

## 2019-04-02 RX ORDER — SODIUM CHLORIDE 0.9 % (FLUSH) 0.9 %
10 SYRINGE (ML) INJECTION EVERY 12 HOURS SCHEDULED
Status: DISCONTINUED | OUTPATIENT
Start: 2019-04-02 | End: 2019-04-04 | Stop reason: SDUPTHER

## 2019-04-02 RX ORDER — EZETIMIBE 10 MG/1
10 TABLET ORAL DAILY
Status: DISCONTINUED | OUTPATIENT
Start: 2019-04-02 | End: 2019-04-04 | Stop reason: HOSPADM

## 2019-04-02 RX ORDER — LEFLUNOMIDE 20 MG/1
20 TABLET ORAL DAILY
Status: DISCONTINUED | OUTPATIENT
Start: 2019-04-02 | End: 2019-04-04 | Stop reason: HOSPADM

## 2019-04-02 RX ORDER — 0.9 % SODIUM CHLORIDE 0.9 %
250 INTRAVENOUS SOLUTION INTRAVENOUS ONCE
Status: COMPLETED | OUTPATIENT
Start: 2019-04-02 | End: 2019-04-03

## 2019-04-02 RX ORDER — METOPROLOL TARTRATE 50 MG/1
50 TABLET, FILM COATED ORAL 2 TIMES DAILY
Status: DISCONTINUED | OUTPATIENT
Start: 2019-04-02 | End: 2019-04-02

## 2019-04-02 RX ORDER — FOLIC ACID 1 MG/1
1 TABLET ORAL DAILY
Status: DISCONTINUED | OUTPATIENT
Start: 2019-04-02 | End: 2019-04-04 | Stop reason: HOSPADM

## 2019-04-02 RX ORDER — ONDANSETRON 2 MG/ML
4 INJECTION INTRAMUSCULAR; INTRAVENOUS EVERY 6 HOURS PRN
Status: DISCONTINUED | OUTPATIENT
Start: 2019-04-02 | End: 2019-04-04 | Stop reason: HOSPADM

## 2019-04-02 RX ORDER — SODIUM CHLORIDE 0.9 % (FLUSH) 0.9 %
10 SYRINGE (ML) INJECTION EVERY 12 HOURS SCHEDULED
Status: DISCONTINUED | OUTPATIENT
Start: 2019-04-02 | End: 2019-04-04 | Stop reason: HOSPADM

## 2019-04-02 RX ORDER — UBIDECARENONE 100 MG
100 CAPSULE ORAL 2 TIMES DAILY
Status: DISCONTINUED | OUTPATIENT
Start: 2019-04-02 | End: 2019-04-04 | Stop reason: HOSPADM

## 2019-04-02 RX ORDER — PANTOPRAZOLE SODIUM 40 MG/10ML
40 INJECTION, POWDER, LYOPHILIZED, FOR SOLUTION INTRAVENOUS EVERY 12 HOURS
Status: DISCONTINUED | OUTPATIENT
Start: 2019-04-02 | End: 2019-04-04 | Stop reason: HOSPADM

## 2019-04-02 RX ORDER — SODIUM CHLORIDE 0.9 % (FLUSH) 0.9 %
10 SYRINGE (ML) INJECTION PRN
Status: DISCONTINUED | OUTPATIENT
Start: 2019-04-02 | End: 2019-04-04 | Stop reason: HOSPADM

## 2019-04-02 RX ORDER — METOPROLOL TARTRATE 5 MG/5ML
5 INJECTION INTRAVENOUS EVERY 6 HOURS
Status: DISCONTINUED | OUTPATIENT
Start: 2019-04-02 | End: 2019-04-04 | Stop reason: HOSPADM

## 2019-04-02 RX ORDER — SODIUM CHLORIDE 0.9 % (FLUSH) 0.9 %
10 SYRINGE (ML) INJECTION PRN
Status: DISCONTINUED | OUTPATIENT
Start: 2019-04-02 | End: 2019-04-04 | Stop reason: SDUPTHER

## 2019-04-02 RX ADMIN — PANTOPRAZOLE SODIUM 40 MG: 40 INJECTION, POWDER, FOR SOLUTION INTRAVENOUS at 13:01

## 2019-04-02 RX ADMIN — Medication 10 ML: at 13:02

## 2019-04-02 RX ADMIN — Medication 10 ML: at 23:59

## 2019-04-02 RX ADMIN — METOPROLOL TARTRATE 5 MG: 5 INJECTION INTRAVENOUS at 23:56

## 2019-04-02 RX ADMIN — SODIUM CHLORIDE 500 ML: 9 INJECTION, SOLUTION INTRAVENOUS at 10:27

## 2019-04-02 RX ADMIN — ATORVASTATIN CALCIUM 80 MG: 80 TABLET, FILM COATED ORAL at 21:54

## 2019-04-02 RX ADMIN — Medication 100 MG: at 21:54

## 2019-04-02 RX ADMIN — SODIUM CHLORIDE 250 ML: 9 INJECTION, SOLUTION INTRAVENOUS at 13:02

## 2019-04-02 RX ADMIN — NITROGLYCERIN 0.5 INCH: 20 OINTMENT TOPICAL at 21:54

## 2019-04-02 RX ADMIN — Medication 10 ML: at 21:55

## 2019-04-02 RX ADMIN — PANTOPRAZOLE SODIUM 40 MG: 40 INJECTION, POWDER, FOR SOLUTION INTRAVENOUS at 23:56

## 2019-04-02 RX ADMIN — NITROGLYCERIN 0.5 INCH: 20 OINTMENT TOPICAL at 15:01

## 2019-04-02 ASSESSMENT — ENCOUNTER SYMPTOMS
STRIDOR: 0
WHEEZING: 0
VOMITING: 0
ABDOMINAL DISTENTION: 0
SHORTNESS OF BREATH: 1
RESPIRATORY NEGATIVE: 1
ANAL BLEEDING: 0
ABDOMINAL PAIN: 0
SORE THROAT: 0
EYES NEGATIVE: 1
CHEST TIGHTNESS: 0
GASTROINTESTINAL NEGATIVE: 1
BLOOD IN STOOL: 0
EYE PAIN: 0
SHORTNESS OF BREATH: 0
NAUSEA: 0
COUGH: 0

## 2019-04-02 NOTE — CONSULTS
[address]                      [address], [address] [facility]                                  CONSULTATION    PATIENT NAME: Dennis Hinojosa                  :        1950  MED REC NO:   [id number]                            ROOM:       G080  ACCOUNT NO:   [de-identified]                           ADMIT DATE: 2019  PROVIDER:     Kelsey Tolliver MD    CONSULT DATE:  2019    REASON FOR CONSULTATION:  Anemia and heme-positive stool. HISTORY OF PRESENT ILLNESS:  The patient is a 62-year-old male who was  found to be severely anemic when he had a blood test done at his  rheumatologist's office. Because of low blood count, he was advised to  come to the hospital.  He was evaluated in the ER and was found to be  severely anemic and was admitted for further management. The stool was  positive for occult blood. He had a bleeding Dieulafoy's ulcer in the  duodenum which was diagnosed in 2018. The patient has been on  Xarelto and Plavix because of the history of atrial fibrillation and  pulmonary embolism. He reports no abdominal pain. No nausea. No  vomiting. Stool appears to be normal for him. No significant change in  bowel habits. He reports occasional nosebleed. PAST MEDICAL HISTORY:  Includes history of coronary artery disease,  rheumatoid arthritis, hypertension, and hyperlipidemia. MEDICATIONS:  Zetia, Lopressor, Lipitor, methotrexate, Xarelto, Plavix,  Nexium, folic acid, Prinivil, prednisone, tofacitinib, Arava, and  vitamin D.    SOCIAL HISTORY:  He used to smoke in the past, stopped many years ago. No history of any alcohol use. REVIEW OF SYSTEMS:  Has been feeling weak and tired lately. No chest  pain. No shortness of breath. No ankle swelling. History of  occasional epistaxis which he relates to dry nose. No significant  change in bowel habits. FAMILY HISTORY:  Unremarkable.     PHYSICAL EXAMINATION:  GENERAL:  A

## 2019-04-02 NOTE — CONSULTS
Consults    Patient Name: Abelardo Bustamante  Admit Date: 2019  9:38 AM  MR #: [id number]  : 1950    Attending Physician: Loren Esquivel DO  Reason for consult: GIB CAD     History of Presenting Illness:      Abelardo Bustamante is a 79 y.o. male on hospital day 0 with a history of . History Obtained From:  patient, electronic medical record    Pt sent by Rheumatoligist after abn Lab. Found to be severely anemia. Pt did not notice GIB. No Abd pain. Stool + for bleed. Has remote h/o Duodenal Ulcer     He has h/p Rempte Pulmonary Embolism and PAF for which he has been on Xarelto. Hehas known Carotid dz as well. He has extensive CAD with multiple recent Stents. 10/2018 RCA QIAN, 2019 LAD QIAN and 2019 CX QIAN- for which he has been on Plavix.      He currently has no angina  History:      EKG:  Past Medical History:   Diagnosis Date    Depression 2007    History of non-ST elevation myocardial infarction (NSTEMI) 10/19/2018    Overview:  H POA Recent  Chest pain with sweats A CE+ Loaded with plavix  for cath with Dr Emmanuel Dose yesterday PCI QIAN to RCA P Continue ASA, Plavix and xarelot for 1 month then DC ASA,continue on  BB and increased lipitor dose    Hyperlipidemia     Hypertension     Osteoarthritis     RA (rheumatoid arthritis) ([address]     [facility] Dr. Nelly Lugo Rheumatoid arthritis(714.0)      Past Surgical History:   Procedure Laterality Date    FRACTURE SURGERY  2004    OPEN REDUCTION LEFT LEG    UPPER GASTROINTESTINAL ENDOSCOPY N/A 11/10/2018    EGD ESOPHAGOGASTRODUODENOSCOPY performed by Jignesh Mari MD at [facility] [place]       Family History  Family History   Problem Relation Age of Onset    High Blood Pressure Father     Heart Attack Father      [] Unable to obtain due to ventilated and/ or neurologic status    Social History     Socioeconomic History    Marital status:      Spouse name: Not on file    Number of children: Not on file    Years of education: Not on file    Highest tablet, Take 75 mg by mouth daily  esomeprazole Magnesium (NEXIUM) 40 MG PACK, Take 40 mg by mouth daily  folic acid (FOLVITE) 1 MG tablet, Take 1 mg by mouth daily  lisinopril (PRINIVIL;ZESTRIL) 10 MG tablet, Take 10 mg by mouth daily  predniSONE (DELTASONE) 5 MG tablet, Take 5 mg by mouth daily  Tofacitinib Citrate 11 MG TB24, Take 1 tablet by mouth daily  leflunomide (ARAVA) 20 MG tablet, Take 1 tablet by mouth daily. VITAMIN D, CHOLECALCIFEROL, PO, Take 4,000 Int'l Units by mouth daily     Current Hospital Medications:     Scheduled Meds:   sodium chloride  250 mL Intravenous Once    sodium chloride flush  10 mL Intravenous 2 times per day    pantoprazole  40 mg Intravenous Q12H    And    sodium chloride (PF)  10 mL Intravenous Q12H    sodium chloride flush  10 mL Intravenous 2 times per day    atorvastatin  80 mg Oral Nightly    coenzyme Q10  100 mg Oral BID    ezetimibe  10 mg Oral Daily    folic acid  1 mg Oral Daily    metoprolol tartrate  50 mg Oral BID    vitamin D  4,000 Units Oral Daily    Tofacitinib Citrate  1 tablet Oral Daily    leflunomide  20 mg Oral Daily     Continuous Infusions:  PRN Meds:.sodium chloride flush, sodium chloride flush, acetaminophen, ondansetron  . Allergies: Allergies   Allergen Reactions    Antihistamine [Diphenhydramine Hcl] Other (See Comments)     jittery        Review of Systems:       Review of Systems   Constitutional: Negative. Negative for diaphoresis and fatigue. HENT: Negative. Eyes: Negative. Respiratory: Negative. Negative for cough, chest tightness, shortness of breath, wheezing and stridor. Cardiovascular: Negative. Negative for chest pain, palpitations and leg swelling. Gastrointestinal: Negative. Negative for blood in stool and nausea. Genitourinary: Negative. Musculoskeletal: Negative. Skin: Negative. Neurological: Negative. Negative for dizziness, syncope, weakness and light-headedness.    Hematological: Negative. Psychiatric/Behavioral: Negative. Objective Findings:     Vitals:BP (!) 148/58   Pulse 72   Temp 98.1 °F (36.7 °C)   Resp 16   Ht 5' 10\" (1.778 m)   Wt 205 lb (93 kg)   SpO2 100%   BMI 29.41 kg/m²      Physical Examination:    Physical Exam   Constitutional: He appears healthy. No distress. HENT:   Normal cephalic and Atraumatic   Eyes: Pupils are equal, round, and reactive to light. Neck: Normal range of motion and thyroid normal. Neck supple. No JVD present. No neck adenopathy. No thyromegaly present. Cardiovascular: Normal rate, regular rhythm, intact distal pulses and normal pulses. Murmur heard. Pulmonary/Chest: Effort normal and breath sounds normal. He has no wheezes. He has no rales. He exhibits no tenderness. Abdominal: Soft. Bowel sounds are normal. There is no tenderness. Musculoskeletal: Normal range of motion. He exhibits no edema or tenderness. Neurological: He is alert and oriented to person, place, and time. Skin: Skin is warm. No cyanosis. Nails show no clubbing.          Results/ Medications reviewed 4/2/2019, 2:01 PM     Laboratory, Microbiology, Pathology, Radiology, Cardiology, Medications and Transcriptions reviewed  Scheduled Meds:   sodium chloride  250 mL Intravenous Once    sodium chloride flush  10 mL Intravenous 2 times per day    pantoprazole  40 mg Intravenous Q12H    And    sodium chloride (PF)  10 mL Intravenous Q12H    sodium chloride flush  10 mL Intravenous 2 times per day    atorvastatin  80 mg Oral Nightly    coenzyme Q10  100 mg Oral BID    ezetimibe  10 mg Oral Daily    folic acid  1 mg Oral Daily    metoprolol tartrate  50 mg Oral BID    vitamin D  4,000 Units Oral Daily    Tofacitinib Citrate  1 tablet Oral Daily    leflunomide  20 mg Oral Daily     Continuous Infusions:    Recent Labs     04/02/19  1000   WBC 6.5   HGB 6.5*   HCT 20.7*   MCV 72.9*        Recent Labs     04/02/19  1000   *   K 4.4   CL 96 CO2 20   BUN 22   CREATININE 0.92     Recent Labs     04/02/19  1000   AST 24   ALT 22   BILITOT 0.5   ALKPHOS 38     No results for input(s): LIPASE, AMYLASE in the last 72 hours. Recent Labs     04/02/19  1000   PROT 6.6   INR 1.2     Xr Chest Portable    Result Date: 4/2/2019  XR CHEST PORTABLE: 4/2/2019 CLINICAL HISTORY:  sob . COMPARISON: 2/27/2019. Two portable upright AP radiographs of the chest were obtained. FINDINGS: Hyperinflation and coarsening of the bronchovascular structures consistent with COPD appears unchanged. There are no developing infiltrates, pleural effusion, cardiomegaly, vascular congestion, pneumothorax, or displaced fractures identified. NO EVIDENCE OF ACTIVE CARDIOPULMONARY DISEASE. Us Carotid Artery Bilateral    Result Date: 3/19/2019  US CAROTID ARTERY BILATERAL: 3/19/2019 CLINICAL HISTORY: R09.89 Right carotid bruit ICD10. COMPARISON: None available. Grayscale, color and waveform Doppler analysis of the cervical carotid and vertebral arteries was performed. Validated velocity measurements with angiographic measurements, velocity criteria are extrapolated from diameter data as defined by the Society of Radiologist in [address] [place] [address] Radiology 2003; 537;[id number]. FINDINGS: There is moderate to marked irregular heterogeneous atherosclerotic plaquing of the carotid bulbs, greater on the right. Maximum systolic velocity within the left internal and mid common carotid arteries are 245 and 102 cm/s, with an ICA/CCA ratio of approximately 2.4 , which indicates 70% to near occlusion by maximum velocity criteria, and 50-69% by velocity ratio criteria. Maximum systolic velocity within the right internal and mid common carotid arteries are 149 and 122 cm/s, with an ICA/CCA ratio of approximately 1.2, which indicates 50-69% by maximum velocity criteria, and less than 50% by velocity ratio criteria.  Maximum velocities within the right and left external carotid arteries are 90 and 106 cm/sec respectively. There is antegrade flow in both vertebral arteries. APPROXIMATELY 70% NARROWING OF THE ORIGIN OF THE RIGHT INTERNAL CAROTID ARTERY. APPROXIMATELY 50% NARROWING OF THE ORIGIN OF THE LEFT INTERNAL CAROTID ARTERY. FURTHER EVALUATION WITH NECK CTA IS SUGGESTED, AS CLINICALLY WARRANTED. Active Hospital Problems    Diagnosis Date Noted    GI bleed [K92.2] 04/02/2019     Priority: Low         Impression/Plan:   1. GIB iv PPI EGD  2. Extensive CAD with multiple recent QIAN- he need Plavix ASAP  3. Moderate ROCK stenosis- by Velocity not severe. 4. Change BB to iv for now. 5. Add Nitropaste for now  6. Will Review Echo     Thank you for allowing us to participate in the care of this patient. Will continue to follow. Please call if questions or concerns arise.     Electronically signed by Trace Carter MD on 4/2/2019 at 2:01 PM

## 2019-04-02 NOTE — H&P
intact  SKIN:  Pallor, no rash, warm, dry    DATA:     Diagnostic tests reviewed for today's visit:    Most recent labs and imaging results reviewed.      LABS:    Recent Results (from the past 24 hour(s))   CBC Auto Differential    Collection Time: 04/02/19 10:00 AM   Result Value Ref Range    WBC 6.5 4.8 - 10.8 K/uL    RBC 2.84 (L) 4.70 - 6.10 M/uL    Hemoglobin 6.5 (LL) 14.0 - 18.0 g/dL    Hematocrit 20.7 (LL) 42.0 - 52.0 %    MCV 72.9 (L) 80.0 - 100.0 fL    MCH 22.7 (L) 27.0 - 31.3 pg    MCHC 31.2 (L) 33.0 - 37.0 %    RDW 25.5 (H) 11.5 - 14.5 %    Platelets 117 581 - 249 K/uL    SLIDE REVIEW see below     Neutrophils % 73.3 %    Lymphocytes % 15.9 %    Monocytes % 8.4 %    Eosinophils % 1.5 %    Basophils % 0.9 %    Neutrophils # 4.8 1.4 - 6.5 K/uL    Lymphocytes # 1.0 1.0 - 4.8 K/uL    Monocytes # 0.5 0.2 - 0.8 K/uL    Eosinophils # 0.1 0.0 - 0.7 K/uL    Basophils # 0.1 0.0 - 0.2 K/uL    Anisocytosis 2+     Poikilocytes 3+     Acanthocytes 2+     Ovalocytes 2+     Tear Drop Cells 1+    Comprehensive Metabolic Panel    Collection Time: 04/02/19 10:00 AM   Result Value Ref Range    Sodium 130 (L) 135 - 144 mEq/L    Potassium 4.4 3.4 - 4.9 mEq/L    Chloride 96 95 - 107 mEq/L    CO2 20 20 - 31 mEq/L    Anion Gap 14 9 - 15 mEq/L    Glucose 109 (H) 70 - 99 mg/dL    BUN 22 8 - 23 mg/dL    CREATININE 0.92 0.70 - 1.20 mg/dL    GFR Non-African American >60.0 >60    GFR  >60.0 >60    Calcium 8.9 8.5 - 9.9 mg/dL    Total Protein 6.6 6.3 - 8.0 g/dL    Alb 4.3 3.5 - 4.6 g/dL    Total Bilirubin 0.5 0.2 - 0.7 mg/dL    Alkaline Phosphatase 38 35 - 104 U/L    ALT 22 0 - 41 U/L    AST 24 0 - 40 U/L    Globulin 2.3 2.3 - 3.5 g/dL   Troponin    Collection Time: 04/02/19 10:00 AM   Result Value Ref Range    Troponin <0.010 0.000 - 0.010 ng/mL   TYPE AND SCREEN    Collection Time: 04/02/19 10:00 AM   Result Value Ref Range    ABO/Rh A POS     Antibody Screen NEG    PREPARE RBC (CROSSMATCH), 2 Units    Collection Time: 04/02/19 10:00 AM   Result Value Ref Range    Product Code Blood Bank S6975V08     Description Blood Bank Red Blood Cells, Leuko-reduced     Unit Number [id number]     Dispense Status Blood Bank selected     Product Code Blood Bank [id number]     Description Blood Bank Red Blood Cells, Leuko-reduced     Unit Number [id number]     Dispense Status Blood Bank selected    Protime-INR    Collection Time: 04/02/19 10:00 AM   Result Value Ref Range    Protime 12.4 (H) 9.0 - 11.5 sec    INR 1.2    Vitamin B12 & Folate    Collection Time: 04/02/19 10:00 AM   Result Value Ref Range    Vitamin B-12 451 232 - 1245 pg/mL    Folate >20.0 7.3 - 26.1 ng/mL   Iron and TIBC    Collection Time: 04/02/19 10:00 AM   Result Value Ref Range    Iron 57 (L) 59 - 158 ug/dL    TIBC 414 178 - 450 ug/dL    Iron Saturation 14 11 - 46 %   Ferritin    Collection Time: 04/02/19 10:00 AM   Result Value Ref Range    Ferritin 17.3 (L) 30.0 - 400.0 ng/mL       IMAGING:  No results found.     VTE Prophylaxis: SCDs    ASSESSMENT AND PLAN  GI hemorrhage/guaiac positive stool  Severe anemia of blood loss  CAD s/p recent PCI  Hx MI  RA/OA      Admit inpt w/tele  Consult cardiology and GI  Transfuse 1 unit PRBCs, reassess hgb  protonix bid   Guaiac test all stool  Ortho VS now and qam  Hold all anticoagulation  Cbc, bmp, Mg in am    Plan of care discussed with: patient and spouse    SIGNATURE: Richi Segal PA-C  DATE: April 2, 2019  TIME: 11:41 AM     Dr. Lizet Plasencia - supervising physician

## 2019-04-02 NOTE — PROGRESS NOTES
Pt's vitals are stable, 0/10 pain. Last hgb was 6.5, first unit of blood is currently infusing. He tells me that he has not noticed any blood in his stool/black stools. No complaints of dizziness/light headed when ambulating. Orthos were negative. Nitro paste applied, tele on. Call light in reach. 1702: Pt's hgb came up to 6.9, will hang next unit of blood.

## 2019-04-02 NOTE — ED PROVIDER NOTES
[facility]  4W MED SURG UNIT  eMERGENCY dEPARTMENTeNCOUnter      Pt Name: Ashley Lara  MRN: [id number]  [place] 1950  Date ofevaluation: 4/2/2019  Provider: Wendy Stinson DO    CHIEF COMPLAINT       Chief Complaint   Patient presents with    Abnormal Test Results     6.7 hgb from [facility] blood test per patient. pale and weakness per patient. HISTORY OF PRESENT ILLNESS   (Location/Symptom, Timing/Onset,Context/Setting, Quality, Duration, Modifying Factors, Severity)  Note limiting factors. Ashley Lara is a 79 y.o. male who presents to the emergency department . Patient was sent to the ER by his rheumatologist after finding him to have a hemoglobin of 6.7. Patient does have some chronic anemia but not this low. He did have a GI bleed in the past and states that he had some bleeding between his stomach and small intestine. He is currently taking Nexium but is on Plavix and Xarelto. Patient has 4 coronary artery stents. He has not noticed any change in his bowel movements and has not had abdominal pain. He has felt lightheaded, weak and short of breath. HPI    NursingNotes were reviewed. REVIEW OF SYSTEMS    (2-9 systems for level 4, 10 or more for level 5)     Review of Systems   Constitutional: Positive for fatigue. Negative for activity change and appetite change. HENT: Negative for congestion and sore throat. Eyes: Negative for pain and visual disturbance. Respiratory: Positive for shortness of breath. Negative for chest tightness. Cardiovascular: Negative for chest pain. Gastrointestinal: Negative for abdominal distention, abdominal pain, anal bleeding, blood in stool, nausea and vomiting. Endocrine: Negative for polydipsia. Genitourinary: Negative for flank pain and urgency. Musculoskeletal: Negative for gait problem and neck stiffness. Skin: Negative for rash. Neurological: Positive for light-headedness. Negative for weakness and headaches. (DELTASONE) 5 MG tablet Take 5 mg by mouth daily      Tofacitinib Citrate 11 MG TB24 Take 1 tablet by mouth daily      leflunomide (ARAVA) 20 MG tablet Take 1 tablet by mouth daily.   Qty: 10 tablet, Refills: 0      VITAMIN D, CHOLECALCIFEROL, PO Take 4,000 Int'l Units by mouth daily              ALLERGIES     Antihistamine [diphenhydramine hcl]    FAMILY HISTORY       Family History   Problem Relation Age of Onset    High Blood Pressure Father     Heart Attack Father           SOCIAL HISTORY       Social History     Socioeconomic History    Marital status:      Spouse name: None    Number of children: None    Years of education: None    Highest education level: None   Occupational History    None   Social Needs    Financial resource strain: None    Food insecurity:     Worry: None     Inability: None    Transportation needs:     Medical: None     Non-medical: None   Tobacco Use    Smoking status: Former Smoker     Packs/day: 1.00     Years: 45.00     Pack years: 45.00     Types: Cigarettes     Last attempt to quit: 2017     Years since quittin.3    Smokeless tobacco: Never Used   Substance and Sexual Activity    Alcohol use: No    Drug use: No    Sexual activity: Not Currently   Lifestyle    Physical activity:     Days per week: None     Minutes per session: None    Stress: None   Relationships    Social connections:     Talks on phone: None     Gets together: None     Attends Hindu service: None     Active member of club or organization: None     Attends meetings of clubs or organizations: None     Relationship status: None    Intimate partner violence:     Fear of current or ex partner: None     Emotionally abused: None     Physically abused: None     Forced sexual activity: None   Other Topics Concern    None   Social History Narrative    LIVES W WIFE       SCREENINGS              PHYSICAL EXAM    (up to 7 for level 4, 8 or more for level 5)     ED Triage Vitals [19 available at the time ofthis note:    XR CHEST PORTABLE    (Results Pending)         ED BEDSIDE ULTRASOUND:   Performed by ED Physician - none    LABS:  Labs Reviewed   CBC WITH AUTO DIFFERENTIAL - Abnormal; Notable for the following components:       Result Value    RBC 2.84 (*)     Hemoglobin 6.5 (*)     Hematocrit 20.7 (*)     MCV 72.9 (*)     MCH 22.7 (*)     MCHC 31.2 (*)     RDW 25.5 (*)     All other components within normal limits    Narrative:     Anil Joseph  [facility] tel. [id number],  hgb and hct results called to and read back by  dr Reena Vuong, 04/02/2019 10:18,  by Nicole Manriquez   COMPREHENSIVE METABOLIC PANEL - Abnormal; Notable for the following components:    Sodium 130 (*)     Glucose 109 (*)     All other components within normal limits   PROTIME-INR - Abnormal; Notable for the following components:    Protime 12.4 (*)     All other components within normal limits   IRON AND TIBC - Abnormal; Notable for the following components:    Iron 57 (*)     All other components within normal limits   FERRITIN - Abnormal; Notable for the following components:    Ferritin 17.3 (*)     All other components within normal limits   TROPONIN   VITAMIN B12 & FOLATE   HEMOGLOBIN AND HEMATOCRIT, BLOOD   POCT OCCULT BLOOD STOOL NON CA SCREEN   TYPE AND SCREEN   PREPARE RBC (CROSSMATCH)       All other labs were within normal range or not returned as of this dictation. EMERGENCY DEPARTMENT COURSE and DIFFERENTIAL DIAGNOSIS/MDM:   Vitals:    Vitals:    04/02/19 0936 04/02/19 1103   BP: 136/88 122/65   Pulse: 77 73   Resp: 20 16   Temp: 97.7 °F (36.5 °C)    TempSrc: Oral    SpO2: 100% 100%   Weight: 205 lb (93 kg)    Height: 5' 10\" (1.778 m)        Patient presented with low hemoglobin. He is positive for blood on rectal exam.  Hemoglobin is 6.5. Patient was typed and crossed and will be transfused while in the hospital.  He does have a GI bleed and gastroenterology will contacted.   Patient's vitals are stable and he did not require ICU. Patient was started on IV Protonix. Unfortunately the patient has coronary artery stents and does require to be on antiplatelet therapy. MDM      REASSESSMENT          CRITICAL CARE TIME   Total Critical Care time was 0 minutes, excluding separatelyreportable procedures. There was a high probability ofclinically significant/life threatening deterioration in the patient's condition which required my urgent intervention. CONSULTS:  IP CONSULT TO HOSPITALIST  IP CONSULT TO GI  IP CONSULT TO GI  IP CONSULT TO CARDIOLOGY    PROCEDURES:  Unless otherwise noted below, none     Procedures    FINAL IMPRESSION      1. Anemia due to acute blood loss    2. Gastrointestinal hemorrhage, unspecified gastrointestinal hemorrhage type          DISPOSITION/PLAN   DISPOSITION Admitted 04/02/2019 11:03:10 AM      PATIENT REFERREDTO:  Araceli Raman MD  [place] [phone]            DISCHARGEMEDICATIONS:  Current Discharge Medication List        Controlled Substances Monitoring:     No flowsheet data found.     (Please note that portions of this note were completed with a voice recognition program.  Efforts were made to edit the dictations but occasionally words are mis-transcribed.)    Christo Fleming DO (electronically signed)  Attending Emergency Physician          Christo Fleming DO  04/02/19 2322

## 2019-04-03 ENCOUNTER — ANESTHESIA (OUTPATIENT)
Dept: OPERATING ROOM | Age: 69
DRG: 813 | End: 2019-04-03
Payer: COMMERCIAL

## 2019-04-03 ENCOUNTER — ANESTHESIA EVENT (OUTPATIENT)
Dept: OPERATING ROOM | Age: 69
DRG: 813 | End: 2019-04-03
Payer: COMMERCIAL

## 2019-04-03 VITALS — DIASTOLIC BLOOD PRESSURE: 67 MMHG | OXYGEN SATURATION: 100 % | SYSTOLIC BLOOD PRESSURE: 143 MMHG

## 2019-04-03 LAB
ALBUMIN SERPL-MCNC: 3.8 G/DL (ref 3.5–4.6)
ALP BLD-CCNC: 35 U/L (ref 35–104)
ALT SERPL-CCNC: 25 U/L (ref 0–41)
ANION GAP SERPL CALCULATED.3IONS-SCNC: 13 MEQ/L (ref 9–15)
AST SERPL-CCNC: 28 U/L (ref 0–40)
BASOPHILS ABSOLUTE: 0.1 K/UL (ref 0–0.2)
BASOPHILS RELATIVE PERCENT: 0.9 %
BILIRUB SERPL-MCNC: 0.9 MG/DL (ref 0.2–0.7)
BUN BLDV-MCNC: 11 MG/DL (ref 8–23)
CALCIUM SERPL-MCNC: 8.5 MG/DL (ref 8.5–9.9)
CHLORIDE BLD-SCNC: 107 MEQ/L (ref 95–107)
CO2: 22 MEQ/L (ref 20–31)
CREAT SERPL-MCNC: 0.82 MG/DL (ref 0.7–1.2)
EOSINOPHILS ABSOLUTE: 0.2 K/UL (ref 0–0.7)
EOSINOPHILS RELATIVE PERCENT: 3.4 %
GFR AFRICAN AMERICAN: >60
GFR NON-AFRICAN AMERICAN: >60
GLOBULIN: 2.2 G/DL (ref 2.3–3.5)
GLUCOSE BLD-MCNC: 83 MG/DL (ref 70–99)
HCT VFR BLD CALC: 26 % (ref 42–52)
HEMOGLOBIN: 8.2 G/DL (ref 14–18)
LYMPHOCYTES ABSOLUTE: 1.2 K/UL (ref 1–4.8)
LYMPHOCYTES RELATIVE PERCENT: 18.9 %
MCH RBC QN AUTO: 24.1 PG (ref 27–31.3)
MCHC RBC AUTO-ENTMCNC: 31.4 % (ref 33–37)
MCV RBC AUTO: 76.7 FL (ref 80–100)
MONOCYTES ABSOLUTE: 0.5 K/UL (ref 0.2–0.8)
MONOCYTES RELATIVE PERCENT: 7.5 %
NEUTROPHILS ABSOLUTE: 4.3 K/UL (ref 1.4–6.5)
NEUTROPHILS RELATIVE PERCENT: 69.3 %
PDW BLD-RTO: 25.5 % (ref 11.5–14.5)
PLATELET # BLD: 161 K/UL (ref 130–400)
POTASSIUM REFLEX MAGNESIUM: 4.3 MEQ/L (ref 3.4–4.9)
RBC # BLD: 3.39 M/UL (ref 4.7–6.1)
SODIUM BLD-SCNC: 142 MEQ/L (ref 135–144)
TOTAL PROTEIN: 6 G/DL (ref 6.3–8)
WBC # BLD: 6.2 K/UL (ref 4.8–10.8)

## 2019-04-03 PROCEDURE — 0DJ08ZZ INSPECTION OF UPPER INTESTINAL TRACT, VIA NATURAL OR ARTIFICIAL OPENING ENDOSCOPIC: ICD-10-PCS | Performed by: SPECIALIST

## 2019-04-03 PROCEDURE — 6370000000 HC RX 637 (ALT 250 FOR IP): Performed by: SPECIALIST

## 2019-04-03 PROCEDURE — 3700000000 HC ANESTHESIA ATTENDED CARE: Performed by: SPECIALIST

## 2019-04-03 PROCEDURE — 93000 ELECTROCARDIOGRAM COMPLETE: CPT | Performed by: INTERNAL MEDICINE

## 2019-04-03 PROCEDURE — 93005 ELECTROCARDIOGRAM TRACING: CPT

## 2019-04-03 PROCEDURE — 85025 COMPLETE CBC W/AUTO DIFF WBC: CPT

## 2019-04-03 PROCEDURE — 6370000000 HC RX 637 (ALT 250 FOR IP): Performed by: INTERNAL MEDICINE

## 2019-04-03 PROCEDURE — 36415 COLL VENOUS BLD VENIPUNCTURE: CPT

## 2019-04-03 PROCEDURE — 6360000002 HC RX W HCPCS: Performed by: SPECIALIST

## 2019-04-03 PROCEDURE — 99232 SBSQ HOSP IP/OBS MODERATE 35: CPT | Performed by: INTERNAL MEDICINE

## 2019-04-03 PROCEDURE — 7100000001 HC PACU RECOVERY - ADDTL 15 MIN: Performed by: SPECIALIST

## 2019-04-03 PROCEDURE — 2709999900 HC NON-CHARGEABLE SUPPLY: Performed by: SPECIALIST

## 2019-04-03 PROCEDURE — 2500000003 HC RX 250 WO HCPCS: Performed by: SPECIALIST

## 2019-04-03 PROCEDURE — 2500000003 HC RX 250 WO HCPCS: Performed by: INTERNAL MEDICINE

## 2019-04-03 PROCEDURE — 2500000003 HC RX 250 WO HCPCS: Performed by: NURSE ANESTHETIST, CERTIFIED REGISTERED

## 2019-04-03 PROCEDURE — 80053 COMPREHEN METABOLIC PANEL: CPT

## 2019-04-03 PROCEDURE — 3609017100 HC EGD: Performed by: SPECIALIST

## 2019-04-03 PROCEDURE — 43235 EGD DIAGNOSTIC BRUSH WASH: CPT | Performed by: SPECIALIST

## 2019-04-03 PROCEDURE — 2580000003 HC RX 258: Performed by: SPECIALIST

## 2019-04-03 PROCEDURE — 1210000000 HC MED SURG R&B

## 2019-04-03 PROCEDURE — 7100000000 HC PACU RECOVERY - FIRST 15 MIN: Performed by: SPECIALIST

## 2019-04-03 PROCEDURE — 6360000002 HC RX W HCPCS: Performed by: NURSE ANESTHETIST, CERTIFIED REGISTERED

## 2019-04-03 PROCEDURE — C9113 INJ PANTOPRAZOLE SODIUM, VIA: HCPCS | Performed by: SPECIALIST

## 2019-04-03 RX ORDER — 0.9 % SODIUM CHLORIDE 0.9 %
10 VIAL (ML) INJECTION PRN
Status: DISCONTINUED | OUTPATIENT
Start: 2019-04-03 | End: 2019-04-04 | Stop reason: SDUPTHER

## 2019-04-03 RX ORDER — PROPOFOL 10 MG/ML
INJECTION, EMULSION INTRAVENOUS CONTINUOUS PRN
Status: DISCONTINUED | OUTPATIENT
Start: 2019-04-03 | End: 2019-04-03 | Stop reason: SDUPTHER

## 2019-04-03 RX ORDER — SODIUM CHLORIDE 0.9 % (FLUSH) 0.9 %
10 SYRINGE (ML) INJECTION EVERY 12 HOURS SCHEDULED
Status: DISCONTINUED | OUTPATIENT
Start: 2019-04-03 | End: 2019-04-04 | Stop reason: SDUPTHER

## 2019-04-03 RX ORDER — LIDOCAINE HYDROCHLORIDE 20 MG/ML
INJECTION, SOLUTION INFILTRATION; PERINEURAL PRN
Status: DISCONTINUED | OUTPATIENT
Start: 2019-04-03 | End: 2019-04-03 | Stop reason: SDUPTHER

## 2019-04-03 RX ORDER — SODIUM CHLORIDE, SODIUM LACTATE, POTASSIUM CHLORIDE, CALCIUM CHLORIDE 600; 310; 30; 20 MG/100ML; MG/100ML; MG/100ML; MG/100ML
INJECTION, SOLUTION INTRAVENOUS CONTINUOUS
Status: DISCONTINUED | OUTPATIENT
Start: 2019-04-03 | End: 2019-04-03

## 2019-04-03 RX ORDER — MAGNESIUM HYDROXIDE 1200 MG/15ML
LIQUID ORAL PRN
Status: DISCONTINUED | OUTPATIENT
Start: 2019-04-03 | End: 2019-04-03 | Stop reason: ALTCHOICE

## 2019-04-03 RX ORDER — ONDANSETRON 2 MG/ML
4 INJECTION INTRAMUSCULAR; INTRAVENOUS
Status: DISCONTINUED | OUTPATIENT
Start: 2019-04-03 | End: 2019-04-03 | Stop reason: HOSPADM

## 2019-04-03 RX ORDER — PREDNISONE 1 MG/1
5 TABLET ORAL DAILY
Status: DISCONTINUED | OUTPATIENT
Start: 2019-04-03 | End: 2019-04-04 | Stop reason: HOSPADM

## 2019-04-03 RX ADMIN — LIDOCAINE HYDROCHLORIDE 30 MG: 20 INJECTION, SOLUTION INFILTRATION; PERINEURAL at 11:59

## 2019-04-03 RX ADMIN — PANTOPRAZOLE SODIUM 40 MG: 40 INJECTION, POWDER, FOR SOLUTION INTRAVENOUS at 13:10

## 2019-04-03 RX ADMIN — POLYETHYLENE GLYCOL 3350, SODIUM SULFATE ANHYDROUS, SODIUM BICARBONATE, SODIUM CHLORIDE, POTASSIUM CHLORIDE 2000 ML: 236; 22.74; 6.74; 5.86; 2.97 POWDER, FOR SOLUTION ORAL at 17:58

## 2019-04-03 RX ADMIN — NITROGLYCERIN 0.5 INCH: 20 OINTMENT TOPICAL at 06:41

## 2019-04-03 RX ADMIN — VITAMIN D, TAB 1000IU (100/BT) 4000 UNITS: 25 TAB at 14:13

## 2019-04-03 RX ADMIN — NITROGLYCERIN 0.5 INCH: 20 OINTMENT TOPICAL at 20:23

## 2019-04-03 RX ADMIN — FOLIC ACID 1 MG: 1 TABLET ORAL at 14:15

## 2019-04-03 RX ADMIN — PREDNISONE 5 MG: 5 TABLET ORAL at 15:58

## 2019-04-03 RX ADMIN — ATORVASTATIN CALCIUM 80 MG: 80 TABLET, FILM COATED ORAL at 20:22

## 2019-04-03 RX ADMIN — METOPROLOL TARTRATE 5 MG: 5 INJECTION INTRAVENOUS at 13:11

## 2019-04-03 RX ADMIN — Medication 10 ML: at 13:15

## 2019-04-03 RX ADMIN — METOPROLOL TARTRATE 5 MG: 5 INJECTION INTRAVENOUS at 20:23

## 2019-04-03 RX ADMIN — Medication 100 MG: at 20:22

## 2019-04-03 RX ADMIN — NITROGLYCERIN 0.5 INCH: 20 OINTMENT TOPICAL at 14:19

## 2019-04-03 RX ADMIN — Medication 100 MG: at 14:14

## 2019-04-03 RX ADMIN — METOPROLOL TARTRATE 5 MG: 5 INJECTION INTRAVENOUS at 07:50

## 2019-04-03 RX ADMIN — PROPOFOL 100 MCG/KG/MIN: 10 INJECTION, EMULSION INTRAVENOUS at 11:59

## 2019-04-03 RX ADMIN — SODIUM CHLORIDE, POTASSIUM CHLORIDE, SODIUM LACTATE AND CALCIUM CHLORIDE 1000 ML: 600; 310; 30; 20 INJECTION, SOLUTION INTRAVENOUS at 11:26

## 2019-04-03 ASSESSMENT — ENCOUNTER SYMPTOMS
WHEEZING: 0
RESPIRATORY NEGATIVE: 1
STRIDOR: 0
COUGH: 0
EYES NEGATIVE: 1
NAUSEA: 0
CHEST TIGHTNESS: 0
SHORTNESS OF BREATH: 0
BLOOD IN STOOL: 1

## 2019-04-03 ASSESSMENT — PULMONARY FUNCTION TESTS
PIF_VALUE: 2
PIF_VALUE: 1
PIF_VALUE: 1
PIF_VALUE: 2
PIF_VALUE: 1

## 2019-04-03 ASSESSMENT — LIFESTYLE VARIABLES: SMOKING_STATUS: 0

## 2019-04-03 NOTE — ANESTHESIA PRE PROCEDURE
Department of Anesthesiology  Preprocedure Note       Name:  Saulo Cespedes   Age:  79 y.o.  :  1950                                          MRN:  [id number]         Date:  4/3/2019      Surgeon: Phillip Pierce):  Juan Antonio Encarnacion MD    Procedure: EGD ESOPHAGOGASTRODUODENOSCOPY ROOM 477 (N/A )    Medications prior to admission:   Prior to Admission medications    Medication Sig Start Date End Date Taking? Authorizing Provider   ezetimibe (ZETIA) 10 MG tablet Take 10 mg by mouth daily   Yes Historical Provider, MD   metoprolol tartrate (LOPRESSOR) 50 MG tablet Take 1.5 tablets by mouth 2 times daily 19  Yes Steven Baptiste MD   atorvastatin (LIPITOR) 80 MG tablet Take 80 mg by mouth 19 Yes Historical Provider, MD   methotrexate (RHEUMATREX) 2.5 MG chemo tablet TAKE 10 TABLETS ONCE A WEEK WITH FOOD - NO ALCOHOL AND HOLD IF ON ANTIBIOTICS OR IF ILL 18  Yes Historical Provider, MD   Coenzyme Q10 (COQ-10) 50 MG CAPS Take 50 mg by mouth 2 times daily   Yes Historical Provider, MD   rivaroxaban (XARELTO) 20 MG TABS tablet Take 1 tablet by mouth daily 18  Yes Rosanne Jones APRN - CNP   clopidogrel (PLAVIX) 75 MG tablet Take 75 mg by mouth daily   Yes Historical Provider, MD   esomeprazole Magnesium (NEXIUM) 40 MG PACK Take 40 mg by mouth daily   Yes Historical Provider, MD   folic acid (FOLVITE) 1 MG tablet Take 1 mg by mouth daily   Yes Historical Provider, MD   lisinopril (PRINIVIL;ZESTRIL) 10 MG tablet Take 10 mg by mouth daily   Yes Historical Provider, MD   predniSONE (DELTASONE) 5 MG tablet Take 5 mg by mouth daily   Yes Historical Provider, MD   Tofacitinib Citrate 11 MG TB24 Take 1 tablet by mouth daily   Yes Historical Provider, MD   leflunomide (ARAVA) 20 MG tablet Take 1 tablet by mouth daily.  13  Yes Beatriz Rutledge MD   VITAMIN D, CHOLECALCIFEROL, PO Take 4,000 Int'l Units by mouth daily    Yes Historical Provider, MD       Current medications:    Current Facility-Administered Medications   Medication Dose Route Frequency Provider Last Rate Last Dose    sodium chloride (PF) 0.9 % injection 10 mL  10 mL Intravenous PRN Mercedez Lopez MD        lactated ringers infusion   Intravenous Continuous Mercedez Lopez  mL/hr at 04/03/19 1126 1,000 mL at 04/03/19 1126    sodium chloride flush 0.9 % injection 10 mL  10 mL Intravenous PRN Nikky Mcclellan DO        sodium chloride flush 0.9 % injection 10 mL  10 mL Intravenous 2 times per day Phil Aleman PA-C   10 mL at 04/02/19 2359    sodium chloride flush 0.9 % injection 10 mL  10 mL Intravenous PRN Phil Aleman PA-C        acetaminophen (TYLENOL) tablet 650 mg  650 mg Oral Q4H PRN Phil Aleman PA-C        ondansetron (ZOFRAN) injection 4 mg  4 mg Intravenous Q6H PRN Phil Aleman PA-C        pantoprazole (PROTONIX) injection 40 mg  40 mg Intravenous Q12H Philsabra Aleman PA-C   40 mg at 04/02/19 2356    And    sodium chloride (PF) 0.9 % injection 10 mL  10 mL Intravenous Q12H Phil Aleman PA-C   10 mL at 04/02/19 2359    sodium chloride flush 0.9 % injection 10 mL  10 mL Intravenous 2 times per day Mercedez Lopez MD   10 mL at 04/02/19 2155    atorvastatin (LIPITOR) tablet 80 mg  80 mg Oral Nightly Philsabra Aleman PA-C   80 mg at 04/02/19 2154    coenzyme Q10 capsule 100 mg  100 mg Oral BID Phil Aleman PA-C   Stopped at 04/03/19 1473    ezetimibe (ZETIA) tablet 10 mg  10 mg Oral Daily Phil Aleman PA-C   Stopped at 84/84/32 5011    folic acid (FOLVITE) tablet 1 mg  1 mg Oral Daily Phil Aleman PA-C   Stopped at 04/03/19 0818    vitamin D (CHOLECALCIFEROL) tablet 4,000 Units  4,000 Units Oral Daily Phil Aleman PA-C   Stopped at 04/03/19 6936    Tofacitinib Citrate TB24 1 tablet  1 tablet Oral Daily Philsabra Aleman PA-C   Stopped at 04/03/19 1751    leflunomide (ARAVA) tablet 20 mg  20 mg Oral Daily Phil Aleman PA-C   Stopped at 04/03/19 1954    metoprolol (LOPRESSOR) injection 5 mg  5 mg Intravenous Q6H Diana Waggoner MD   5 mg at 04/03/19 0750    nitroglycerin (NITRO-BID) 2 % ointment 0.5 inch  0.5 inch Topical 4 times per day Diana Waggoner MD   0.5 inch at 04/03/19 0641       Allergies:     Allergies   Allergen Reactions    Antihistamine [Diphenhydramine Hcl] Other (See Comments)     jittery       Problem List:    Patient Active Problem List   Diagnosis Code    Essential hypertension I10    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 (HCC) M05.79    Seasonal allergic rhinitis J30.2    Chronic sinusitis J32.9    Acute blood loss anemia D62    Gastrointestinal hemorrhage K92.2    Depression F32.9    Lipid disorder E78.9    Paroxysmal atrial fibrillation with RVR ([facility]) I48.0    Post PTCA Z98.61    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, T38.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    Vitamin D deficiency E55.9    Synovitis M65.9    PE (pulmonary thromboembolism) ([facility]) I26.99    History of non-ST elevation myocardial infarction (NSTEMI) I25.2    Long-term use of high-risk medication Z79.899    Methotrexate, long term, current use Z79.899    Nicotine dependence, uncomplicated O34.195    Ventricular tachycardia (HCC) I47.2    GI bleed K92.2    Anemia due to acute blood loss D62       Past Medical History:        Diagnosis Date    Depression 2007    History of non-ST elevation myocardial infarction (NSTEMI) 10/19/2018    Overview:  H POA Recent  Chest pain with sweats A CE+ Loaded with plavix  for cath with Dr Shiraz Dennison yesterday PCI QIAN to RCA P Continue ASA, Plavix and xarelot for 1 month then DC ASA,continue on  BB and increased lipitor dose    Hyperlipidemia     Hypertension     Osteoarthritis     RA (rheumatoid arthritis) ([address]     [facility] Dr. Juli Sahni Rheumatoid arthritis(714.0)        Past Surgical History:        Procedure Laterality Date    FRACTURE SURGERY  2004    OPEN REDUCTION LEFT LEG    UPPER GASTROINTESTINAL ENDOSCOPY N/A 11/10/2018    EGD ESOPHAGOGASTRODUODENOSCOPY performed by Carissa Ayala MD at [place] [address] History:    Social History     Tobacco Use    Smoking status: Former Smoker     Packs/day: 1.00     Years: 45.00     Pack years: 45.00     Types: Cigarettes     Last attempt to quit: 2017     Years since quittin.3    Smokeless tobacco: Never Used   Substance Use Topics    Alcohol use: No                                Counseling given: Not Answered      Vital Signs (Current):   Vitals:    19 0000 19 0800 19 0815 19 1127   BP: (!) 114/55 121/75  133/68   Pulse: 71 84  73   Resp:   18 20   Temp:   98.1 °F (36.7 °C)    TempSrc:   Oral    SpO2:  98% 99% 97%   Weight:       Height:                                                  BP Readings from Last 3 Encounters:   19 133/68   19 118/62   19 (!) 143/81       NPO Status: Time of last liquid consumption: 0000                        Time of last solid consumption: 0000                        Date of last liquid consumption: 19                        Date of last solid food consumption: 19    BMI:   Wt Readings from Last 3 Encounters:   19 205 lb (93 kg)   19 204 lb (92.5 kg)   19 200 lb (90.7 kg)     Body mass index is 29.41 kg/m².     CBC:   Lab Results   Component Value Date    WBC 6.2 2019    RBC 3.39 2019    RBC 4.38 2012    HGB 8.2 2019    HCT 26.0 2019    MCV 76.7 2019    RDW 25.5 2019     2019       CMP:   Lab Results   Component Value Date     2019    K 4.3 2019     2019    CO2 22 2019    BUN 11 2019    CREATININE 0.82 2019    GFRAA >60.0 2019    LABGLOM >60.0 2019    GLUCOSE 83 2019    GLUCOSE 95 2012    PROT 6.0 2019    CALCIUM 8.5 2019    BILITOT 0.9 2019    ALKPHOS 35 2019    AST 28 2019    ALT 25 2019 POC Tests: No results for input(s): POCGLU, POCNA, POCK, POCCL, POCBUN, POCHEMO, POCHCT in the last 72 hours. Coags:   Lab Results   Component Value Date    PROTIME 12.4 04/02/2019    INR 1.2 04/02/2019    APTT 21.9 11/09/2018       HCG (If Applicable): No results found for: PREGTESTUR, PREGSERUM, HCG, HCGQUANT     ABGs: No results found for: PHART, PO2ART, SPN8UEW, CKM3XYZ, BEART, X1AAHASO     Type & Screen (If Applicable):  No results found for: [facility]    Anesthesia Evaluation  Patient summary reviewed and Nursing notes reviewed no history of anesthetic complications:   Airway: Mallampati: II  TM distance: >3 FB   Neck ROM: full  Mouth opening: > = 3 FB Dental: normal exam         Pulmonary:       (-) not a current smoker                          ROS comment: Reformed smoker 2017     Cardiovascular:    (+) hypertension:, past MI: > 6 months, CAD:, CABG/stent:, dysrhythmias: atrial fibrillation,       ECG reviewed      Echocardiogram reviewed                  Neuro/Psych:   (+) psychiatric history:            GI/Hepatic/Renal:            ROS comment: GI bleed. Endo/Other:    (+) blood dyscrasia: anemia and anticoagulation therapy, arthritis: rheumatoid. , .                 Abdominal:           Vascular:   + PE. Anesthesia Plan      MAC     ASA 3       Induction: intravenous. Anesthetic plan and risks discussed with patient and spouse. Plan discussed with CRNA.     Attending anesthesiologist reviewed and agrees with Jose E Savage MD   4/3/2019

## 2019-04-03 NOTE — OP NOTE
Endoscopy Note    Patient: Mitesh Chiang  YOB: 1950  MRN: [id number]  Date of Procedure: 4/3/2019    Pre-Op Diagnosis: Anemia and passing dark stool. Post-Op Diagnosis: Same , normal EGD. Procedure(s):  EGD ESOPHAGOGASTRODUODENOSCOPY    Anesthesia: Monitor Anesthesia Care    Surgeon(s):  Yudith Ansari MD    Staff:  Scrub Person First: Gifty Yoder     Estimated Blood Loss: 0 (units unknown)    Specimens:   * No specimens in log *    Indications: This is a 79y.o. year old male who presents for upper endoscopy with h/o anemia and history of passing dark stool. Patient had an upper endoscopy in November 2018 which showed a possible Dilefoys ulcer in the duodenum which was treated endoscopically. Luna Ko Description of Procedure:  Informed consent was obtained from the patient after explanation of indications, benefits and possible risks and complications of the procedure. The patient was then taken to the endoscopy suite, placed in the left lateral decubitus position and the above IV sedation was administrered. Olympus video gastroscope was inserted into the esophagus under direct visualization and advanced up to the fourth part of the duodenum. Esophageal mucosa is normal.  GE junction is about 42 cm from the incisors. Stomach fundus body antrum and pylorus was examined and was normal duodenal bulb is normal.  In the second part of the duodenum previously placed Endo Clip was noted. No active bleeding was seen in the duodenum. I was able to advance the scope up to the fourth part of the duodenum. The patient tolerated the procedure well and was taken to the post anesthesia care unit in good condition. Impression: No signs of bleeding noted in the upper GI tract. Recommendations: Schedule for colonoscopy in alicia Ansari MD  [facility]

## 2019-04-03 NOTE — CARE COORDINATION
MET WITH PATIENT , FREEDOM OF CHOICE OFFERED. STATES INDEPENDENT FROM HOME WITH WIFE, DENIES DC NEEDS AT THIS TIME.

## 2019-04-03 NOTE — ANESTHESIA POSTPROCEDURE EVALUATION
Department of Anesthesiology  Postprocedure Note    Patient: Morris Botello  MRN: [id number]  YOB: 1950  Date of evaluation: 4/3/2019  Time:  12:18 PM     Procedure Summary     Date:  04/03/19 Room / Location:  [facility] OR    Anesthesia Start:  4331 Anesthesia Stop:  6867    Procedure:  EGD ESOPHAGOGASTRODUODENOSCOPY (N/A ) Diagnosis:  (gii bleeding)    Surgeon:  Griselda Sas, MD Responsible Provider:  Cuong Ulloa MD    Anesthesia Type:  MAC ASA Status:  3          Anesthesia Type: MAC    Fiorella Phase I: Fiorella Score: 10    Fiorella Phase II:      Last vitals: Reviewed and per EMR flowsheets.        Anesthesia Post Evaluation    Patient location during evaluation: bedside  Patient participation: complete - patient participated  Level of consciousness: awake and awake and alert  Airway patency: patent  Nausea & Vomiting: no nausea and no vomiting  Complications: no  Cardiovascular status: blood pressure returned to baseline and hemodynamically stable  Respiratory status: acceptable  Hydration status: euvolemic

## 2019-04-03 NOTE — PROGRESS NOTES
Physician Progress Note    4/3/2019   11:06 AM    Name:  Fercho Marin  MRN:    [id number]      Day: 1     Admit Date: 2019  9:38 AM  PCP: Gilford Reading, MD    Code Status:  Full Code    Subjective:      no new events. Feels well.      Physical Examination:      Vitals:  /75   Pulse 84   Temp 98.1 °F (36.7 °C) (Oral)   Resp 18   Ht 5' 10\" (1.778 m)   Wt 205 lb (93 kg)   SpO2 99%   BMI 29.41 kg/m²   Temp (24hrs), Av.1 °F (36.7 °C), Min:97.7 °F (36.5 °C), Max:98.4 °F (36.9 °C)      General appearance: alert, cooperative and no distress  Mental Status: oriented to person, place and time and normal affect  Lungs: clear to auscultation bilaterally, normal effort  Heart: regular rate and rhythm, no murmur  Abdomen: soft, nontender, nondistended, bowel sounds present, no masses  Extremities: no edema, redness, tenderness in the calves  Skin: no gross lesions, rashes    Data:     Labs:  Recent Labs     19  1000 19  1557 19  0635   WBC 6.5  --  6.2   HGB 6.5* 6.9* 8.2*     --  161     Recent Labs     19  1000 19  0635   * 142   K 4.4 4.3   CL 96 107   CO2 20 22   BUN 22 11   CREATININE 0.92 0.82   GLUCOSE 109* 83     Recent Labs     19  1000 19  0635   AST 24 28   ALT 22 25   BILITOT 0.5 0.9*   ALKPHOS 38 35       Current Facility-Administered Medications   Medication Dose Route Frequency Provider Last Rate Last Dose    sodium chloride flush 0.9 % injection 10 mL  10 mL Intravenous PRN Nikky Gomez DO        sodium chloride flush 0.9 % injection 10 mL  10 mL Intravenous 2 times per day Jennifer Blanca PA-C   10 mL at 19 9952    sodium chloride flush 0.9 % injection 10 mL  10 mL Intravenous PRN Jennifer Blanca PA-C        acetaminophen (TYLENOL) tablet 650 mg  650 mg Oral Q4H PRN Jennifer Blanca PA-C        ondansetron (ZOFRAN) injection 4 mg  4 mg Intravenous Q6H PRN Jennifer Blanca PA-C        pantoprazole (PROTONIX) injection 40 mg  40 mg Intravenous Q12H Fayrene Gulling, PA-C   40 mg at 04/02/19 2356    And    sodium chloride (PF) 0.9 % injection 10 mL  10 mL Intravenous Q12H Fayrene Gulling, PA-C   10 mL at 04/02/19 2359    sodium chloride flush 0.9 % injection 10 mL  10 mL Intravenous 2 times per day Yudith Ansari MD   10 mL at 04/02/19 2155    atorvastatin (LIPITOR) tablet 80 mg  80 mg Oral Nightly Fayrene Gulling, PA-C   80 mg at 04/02/19 2154    coenzyme Q10 capsule 100 mg  100 mg Oral BID Fayrene Gulling, PA-C   Stopped at 04/03/19 5617    ezetimibe (ZETIA) tablet 10 mg  10 mg Oral Daily Fayrene Gulling, PA-C   Stopped at 50/67/91 5953    folic acid (FOLVITE) tablet 1 mg  1 mg Oral Daily Fayrene Gulling, PA-C   Stopped at 04/03/19 0818    vitamin D (CHOLECALCIFEROL) tablet 4,000 Units  4,000 Units Oral Daily Fayrene Gulling, PA-C   Stopped at 04/03/19 5728    Tofacitinib Citrate TB24 1 tablet  1 tablet Oral Daily Fayrene Gulling, PA-C   Stopped at 04/03/19 5640    leflunomide (ARAVA) tablet 20 mg  20 mg Oral Daily Fayrene Gulling, PA-C   Stopped at 04/03/19 0818    metoprolol (LOPRESSOR) injection 5 mg  5 mg Intravenous Q6H Chris Martinez MD   5 mg at 04/03/19 0750    nitroglycerin (NITRO-BID) 2 % ointment 0.5 inch  0.5 inch Topical 4 times per day Chris Martinez MD   0.5 inch at 04/03/19 7399     Assessment and Plan:          Acute Hospital issues:    # acute blood loss anemia due to GI bleed  - GI seen, plan for EGD, monitor H&H     # CAD s/p PCI with QIAN  - will need to restart antiplatelet ASAP due to recent PCI as per cardiology    # Bleeding (Hemorrhagic disorder) d/t   Xarelto/ Plavix present on admission  - as above     DVT/PPx- ordered if appropriate        DISCHARGE PLANNING  Pending GI w/u        Electronically signed by Emeli Allen DO on 4/3/2019 at 11:06 AM

## 2019-04-03 NOTE — PROGRESS NOTES
 Social connections:     Talks on phone: None     Gets together: None     Attends [employer] service: None     Active member of club or organization: None     Attends meetings of clubs or organizations: None     Relationship status: None    Intimate partner violence:     Fear of current or ex partner: None     Emotionally abused: None     Physically abused: None     Forced sexual activity: None   Other Topics Concern    None   Social History Narrative    LIVES W WIFE       Subjective/HPI no cp no sob at rest. No new bleed    EKG:SR 60-70        Review of Systems:   Review of Systems   Constitutional: Negative. Negative for diaphoresis and fatigue. HENT: Negative. Eyes: Negative. Respiratory: Negative. Negative for cough, chest tightness, shortness of breath, wheezing and stridor. Cardiovascular: Negative. Negative for chest pain, palpitations and leg swelling. Gastrointestinal: Positive for blood in stool. Negative for nausea. Genitourinary: Negative. Musculoskeletal: Negative. Skin: Negative. Neurological: Negative. Negative for dizziness, syncope, weakness and light-headedness. Hematological: Negative. Psychiatric/Behavioral: Negative. Physical Examination:    /75   Pulse 84   Temp 98.1 °F (36.7 °C) (Oral)   Resp 18   Ht 5' 10\" (1.778 m)   Wt 205 lb (93 kg)   SpO2 99%   BMI 29.41 kg/m²    Physical Exam   Constitutional: He appears healthy. No distress. HENT:   Normal cephalic and Atraumatic   Eyes: Pupils are equal, round, and reactive to light. Neck: Normal range of motion and thyroid normal. Neck supple. No JVD present. No neck adenopathy. No thyromegaly present. Cardiovascular: Normal rate, regular rhythm, intact distal pulses and normal pulses. Murmur heard. Pulmonary/Chest: Effort normal and breath sounds normal. He has no wheezes. He has no rales. He exhibits no tenderness. Abdominal: Soft. Bowel sounds are normal. There is no tenderness. Musculoskeletal: Normal range of motion. He exhibits no edema or tenderness. Neurological: He is alert and oriented to person, place, and time. Skin: Skin is warm. No cyanosis. Nails show no clubbing.        LABS:  CBC:   Lab Results   Component Value Date    WBC 6.2 04/03/2019    RBC 3.39 04/03/2019    RBC 4.38 04/26/2012    HGB 8.2 04/03/2019    HCT 26.0 04/03/2019    MCV 76.7 04/03/2019    MCH 24.1 04/03/2019    MCHC 31.4 04/03/2019    RDW 25.5 04/03/2019     04/03/2019    MPV 8.3 04/26/2012     CBC with Differential:    Lab Results   Component Value Date    WBC 6.2 04/03/2019    RBC 3.39 04/03/2019    RBC 4.38 04/26/2012    HGB 8.2 04/03/2019    HCT 26.0 04/03/2019     04/03/2019    MCV 76.7 04/03/2019    MCH 24.1 04/03/2019    MCHC 31.4 04/03/2019    RDW 25.5 04/03/2019    LYMPHOPCT 18.9 04/03/2019    MONOPCT 7.5 04/03/2019    BASOPCT 0.9 04/03/2019    MONOSABS 0.5 04/03/2019    LYMPHSABS 1.2 04/03/2019    EOSABS 0.2 04/03/2019    BASOSABS 0.1 04/03/2019     CMP:    Lab Results   Component Value Date     04/03/2019    K 4.3 04/03/2019     04/03/2019    CO2 22 04/03/2019    BUN 11 04/03/2019    CREATININE 0.82 04/03/2019    GFRAA >60.0 04/03/2019    LABGLOM >60.0 04/03/2019    GLUCOSE 83 04/03/2019    GLUCOSE 95 04/26/2012    PROT 6.0 04/03/2019    LABALBU 3.8 04/03/2019    LABALBU 4.5 04/26/2012    CALCIUM 8.5 04/03/2019    BILITOT 0.9 04/03/2019    ALKPHOS 35 04/03/2019    AST 28 04/03/2019    ALT 25 04/03/2019     BMP:    Lab Results   Component Value Date     04/03/2019    K 4.3 04/03/2019     04/03/2019    CO2 22 04/03/2019    BUN 11 04/03/2019    LABALBU 3.8 04/03/2019    LABALBU 4.5 04/26/2012    CREATININE 0.82 04/03/2019    CALCIUM 8.5 04/03/2019    GFRAA >60.0 04/03/2019    LABGLOM >60.0 04/03/2019    GLUCOSE 83 04/03/2019    GLUCOSE 95 04/26/2012     Magnesium:    Lab Results   Component Value Date    MG 1.8 02/27/2019     Troponin:    Lab Results   Component

## 2019-04-04 ENCOUNTER — ANESTHESIA (OUTPATIENT)
Dept: OPERATING ROOM | Age: 69
DRG: 813 | End: 2019-04-04
Payer: COMMERCIAL

## 2019-04-04 ENCOUNTER — ANESTHESIA EVENT (OUTPATIENT)
Dept: OPERATING ROOM | Age: 69
DRG: 813 | End: 2019-04-04
Payer: COMMERCIAL

## 2019-04-04 VITALS
BODY MASS INDEX: 29.35 KG/M2 | RESPIRATION RATE: 14 BRPM | HEART RATE: 77 BPM | DIASTOLIC BLOOD PRESSURE: 70 MMHG | SYSTOLIC BLOOD PRESSURE: 144 MMHG | OXYGEN SATURATION: 98 % | HEIGHT: 70 IN | TEMPERATURE: 97.6 F | WEIGHT: 205 LBS

## 2019-04-04 VITALS
RESPIRATION RATE: 19 BRPM | DIASTOLIC BLOOD PRESSURE: 66 MMHG | SYSTOLIC BLOOD PRESSURE: 149 MMHG | OXYGEN SATURATION: 100 %

## 2019-04-04 LAB
EKG ATRIAL RATE: 71 BPM
EKG ATRIAL RATE: 75 BPM
EKG ATRIAL RATE: 82 BPM
EKG P AXIS: 21 DEGREES
EKG P AXIS: 68 DEGREES
EKG P AXIS: 68 DEGREES
EKG P-R INTERVAL: 148 MS
EKG P-R INTERVAL: 152 MS
EKG P-R INTERVAL: 154 MS
EKG Q-T INTERVAL: 368 MS
EKG Q-T INTERVAL: 380 MS
EKG Q-T INTERVAL: 384 MS
EKG QRS DURATION: 84 MS
EKG QRS DURATION: 88 MS
EKG QRS DURATION: 88 MS
EKG QTC CALCULATION (BAZETT): 410 MS
EKG QTC CALCULATION (BAZETT): 417 MS
EKG QTC CALCULATION (BAZETT): 443 MS
EKG R AXIS: 67 DEGREES
EKG R AXIS: 80 DEGREES
EKG R AXIS: 81 DEGREES
EKG T AXIS: 23 DEGREES
EKG T AXIS: 32 DEGREES
EKG T AXIS: 49 DEGREES
EKG VENTRICULAR RATE: 71 BPM
EKG VENTRICULAR RATE: 75 BPM
EKG VENTRICULAR RATE: 82 BPM
HCT VFR BLD CALC: 28.6 % (ref 42–52)
HEMOGLOBIN: 9 G/DL (ref 14–18)
PLATELET AGGREGATION P2Y12: 320 PRU (ref 18–435)

## 2019-04-04 PROCEDURE — 2500000003 HC RX 250 WO HCPCS: Performed by: NURSE ANESTHETIST, CERTIFIED REGISTERED

## 2019-04-04 PROCEDURE — 3700000001 HC ADD 15 MINUTES (ANESTHESIA): Performed by: SPECIALIST

## 2019-04-04 PROCEDURE — 0DJD8ZZ INSPECTION OF LOWER INTESTINAL TRACT, VIA NATURAL OR ARTIFICIAL OPENING ENDOSCOPIC: ICD-10-PCS | Performed by: SPECIALIST

## 2019-04-04 PROCEDURE — 6370000000 HC RX 637 (ALT 250 FOR IP): Performed by: SPECIALIST

## 2019-04-04 PROCEDURE — 6360000002 HC RX W HCPCS: Performed by: NURSE ANESTHETIST, CERTIFIED REGISTERED

## 2019-04-04 PROCEDURE — 2500000003 HC RX 250 WO HCPCS: Performed by: SPECIALIST

## 2019-04-04 PROCEDURE — 7100000001 HC PACU RECOVERY - ADDTL 15 MIN: Performed by: SPECIALIST

## 2019-04-04 PROCEDURE — 2580000003 HC RX 258: Performed by: SPECIALIST

## 2019-04-04 PROCEDURE — C9113 INJ PANTOPRAZOLE SODIUM, VIA: HCPCS | Performed by: SPECIALIST

## 2019-04-04 PROCEDURE — 2580000003 HC RX 258

## 2019-04-04 PROCEDURE — 2580000003 HC RX 258: Performed by: NURSE ANESTHETIST, CERTIFIED REGISTERED

## 2019-04-04 PROCEDURE — 93005 ELECTROCARDIOGRAM TRACING: CPT

## 2019-04-04 PROCEDURE — 6370000000 HC RX 637 (ALT 250 FOR IP): Performed by: NURSE PRACTITIONER

## 2019-04-04 PROCEDURE — 93010 ELECTROCARDIOGRAM REPORT: CPT | Performed by: INTERNAL MEDICINE

## 2019-04-04 PROCEDURE — 2709999900 HC NON-CHARGEABLE SUPPLY: Performed by: SPECIALIST

## 2019-04-04 PROCEDURE — 85014 HEMATOCRIT: CPT

## 2019-04-04 PROCEDURE — 7100000000 HC PACU RECOVERY - FIRST 15 MIN: Performed by: SPECIALIST

## 2019-04-04 PROCEDURE — 3609027000 HC COLONOSCOPY: Performed by: SPECIALIST

## 2019-04-04 PROCEDURE — 85018 HEMOGLOBIN: CPT

## 2019-04-04 PROCEDURE — 45378 DIAGNOSTIC COLONOSCOPY: CPT | Performed by: SPECIALIST

## 2019-04-04 PROCEDURE — APPNB15 APP NON BILLABLE TIME 0-15 MINS: Performed by: NURSE PRACTITIONER

## 2019-04-04 PROCEDURE — 36415 COLL VENOUS BLD VENIPUNCTURE: CPT

## 2019-04-04 PROCEDURE — 3700000000 HC ANESTHESIA ATTENDED CARE: Performed by: SPECIALIST

## 2019-04-04 PROCEDURE — 85576 BLOOD PLATELET AGGREGATION: CPT

## 2019-04-04 PROCEDURE — 6360000002 HC RX W HCPCS: Performed by: SPECIALIST

## 2019-04-04 RX ORDER — CLOPIDOGREL BISULFATE 75 MG/1
75 TABLET ORAL DAILY
Status: DISCONTINUED | OUTPATIENT
Start: 2019-04-04 | End: 2019-04-04 | Stop reason: HOSPADM

## 2019-04-04 RX ORDER — SODIUM CHLORIDE, SODIUM LACTATE, POTASSIUM CHLORIDE, CALCIUM CHLORIDE 600; 310; 30; 20 MG/100ML; MG/100ML; MG/100ML; MG/100ML
INJECTION, SOLUTION INTRAVENOUS
Status: COMPLETED
Start: 2019-04-04 | End: 2019-04-04

## 2019-04-04 RX ORDER — SODIUM CHLORIDE, SODIUM LACTATE, POTASSIUM CHLORIDE, CALCIUM CHLORIDE 600; 310; 30; 20 MG/100ML; MG/100ML; MG/100ML; MG/100ML
INJECTION, SOLUTION INTRAVENOUS CONTINUOUS PRN
Status: DISCONTINUED | OUTPATIENT
Start: 2019-04-04 | End: 2019-04-04 | Stop reason: SDUPTHER

## 2019-04-04 RX ORDER — MAGNESIUM HYDROXIDE 1200 MG/15ML
LIQUID ORAL PRN
Status: DISCONTINUED | OUTPATIENT
Start: 2019-04-04 | End: 2019-04-04 | Stop reason: ALTCHOICE

## 2019-04-04 RX ORDER — SODIUM CHLORIDE, SODIUM LACTATE, POTASSIUM CHLORIDE, CALCIUM CHLORIDE 600; 310; 30; 20 MG/100ML; MG/100ML; MG/100ML; MG/100ML
INJECTION, SOLUTION INTRAVENOUS CONTINUOUS
Status: DISCONTINUED | OUTPATIENT
Start: 2019-04-04 | End: 2019-04-04 | Stop reason: HOSPADM

## 2019-04-04 RX ORDER — LIDOCAINE HYDROCHLORIDE 20 MG/ML
INJECTION, SOLUTION INFILTRATION; PERINEURAL PRN
Status: DISCONTINUED | OUTPATIENT
Start: 2019-04-04 | End: 2019-04-04 | Stop reason: SDUPTHER

## 2019-04-04 RX ORDER — ONDANSETRON 2 MG/ML
4 INJECTION INTRAMUSCULAR; INTRAVENOUS
Status: DISCONTINUED | OUTPATIENT
Start: 2019-04-04 | End: 2019-04-04 | Stop reason: HOSPADM

## 2019-04-04 RX ORDER — PROPOFOL 10 MG/ML
INJECTION, EMULSION INTRAVENOUS PRN
Status: DISCONTINUED | OUTPATIENT
Start: 2019-04-04 | End: 2019-04-04 | Stop reason: SDUPTHER

## 2019-04-04 RX ADMIN — PROPOFOL 250 MG: 10 INJECTION, EMULSION INTRAVENOUS at 13:35

## 2019-04-04 RX ADMIN — NITROGLYCERIN 0.5 INCH: 20 OINTMENT TOPICAL at 01:38

## 2019-04-04 RX ADMIN — SODIUM CHLORIDE, POTASSIUM CHLORIDE, SODIUM LACTATE AND CALCIUM CHLORIDE 1000 ML: 600; 310; 30; 20 INJECTION, SOLUTION INTRAVENOUS at 12:10

## 2019-04-04 RX ADMIN — METOPROLOL TARTRATE 5 MG: 5 INJECTION INTRAVENOUS at 09:38

## 2019-04-04 RX ADMIN — LIDOCAINE HYDROCHLORIDE 50 MG: 20 INJECTION, SOLUTION INFILTRATION; PERINEURAL at 13:35

## 2019-04-04 RX ADMIN — NITROGLYCERIN 0.5 INCH: 20 OINTMENT TOPICAL at 15:13

## 2019-04-04 RX ADMIN — NITROGLYCERIN 0.5 INCH: 20 OINTMENT TOPICAL at 09:37

## 2019-04-04 RX ADMIN — PANTOPRAZOLE SODIUM 40 MG: 40 INJECTION, POWDER, FOR SOLUTION INTRAVENOUS at 01:38

## 2019-04-04 RX ADMIN — METOPROLOL TARTRATE 5 MG: 5 INJECTION INTRAVENOUS at 01:38

## 2019-04-04 RX ADMIN — METOPROLOL TARTRATE 5 MG: 5 INJECTION INTRAVENOUS at 15:13

## 2019-04-04 RX ADMIN — CLOPIDOGREL BISULFATE 75 MG: 75 TABLET, FILM COATED ORAL at 15:12

## 2019-04-04 RX ADMIN — SODIUM CHLORIDE, POTASSIUM CHLORIDE, SODIUM LACTATE AND CALCIUM CHLORIDE: 600; 310; 30; 20 INJECTION, SOLUTION INTRAVENOUS at 13:23

## 2019-04-04 RX ADMIN — ACETAMINOPHEN 650 MG: 325 TABLET ORAL at 16:33

## 2019-04-04 RX ADMIN — LEFLUNOMIDE 20 MG: 20 TABLET ORAL at 15:12

## 2019-04-04 RX ADMIN — SODIUM CHLORIDE, SODIUM LACTATE, POTASSIUM CHLORIDE, CALCIUM CHLORIDE 1000 ML: 600; 310; 30; 20 INJECTION, SOLUTION INTRAVENOUS at 12:10

## 2019-04-04 RX ADMIN — Medication 10 ML: at 09:38

## 2019-04-04 RX ADMIN — Medication 10 ML: at 01:43

## 2019-04-04 ASSESSMENT — PULMONARY FUNCTION TESTS
PIF_VALUE: 0
PIF_VALUE: 1
PIF_VALUE: 0

## 2019-04-04 ASSESSMENT — ENCOUNTER SYMPTOMS
CHEST TIGHTNESS: 0
BLOOD IN STOOL: 1
STRIDOR: 0
SHORTNESS OF BREATH: 0
EYES NEGATIVE: 1
NAUSEA: 0
RESPIRATORY NEGATIVE: 1
COUGH: 0
WHEEZING: 0

## 2019-04-04 ASSESSMENT — PAIN SCALES - GENERAL
PAINLEVEL_OUTOF10: 2
PAINLEVEL_OUTOF10: 0
PAINLEVEL_OUTOF10: 0

## 2019-04-04 NOTE — PROGRESS NOTES
Physician Progress Note    2019   2:31 PM    Name:  Marietta Ulloa  MRN:    [id number]      Day: 2     Admit Date: 2019  9:38 AM  PCP: Michael Curry MD    Code Status:  Full Code    Subjective:      no new events. Feels well. Physical Examination:      Vitals:  /60   Pulse 71   Temp 97.6 °F (36.4 °C) (Temporal)   Resp 15   Ht 5' 10\" (1.778 m)   Wt 205 lb (93 kg)   SpO2 98%   BMI 29.41 kg/m²   Temp (24hrs), Av °F (36.7 °C), Min:97.4 °F (36.3 °C), Max:98.8 °F (37.1 °C)      General appearance: alert, cooperative and no distress  Mental Status: oriented to person, place and time and normal affect  Lungs: clear to auscultation bilaterally, normal effort  Heart: regular rate and rhythm, no murmur  Abdomen: soft, nontender, nondistended, bowel sounds present, no masses  Extremities: no edema, redness, tenderness in the calves  Skin: no gross lesions, rashes    Data:     Labs:  Recent Labs     19  1000  19  0635 19  0617   WBC 6.5  --  6.2  --    HGB 6.5*   < > 8.2* 9.0*     --  161  --     < > = values in this interval not displayed.      Recent Labs     19  1000 19  0635   * 142   K 4.4 4.3   CL 96 107   CO2 20 22   BUN 22 11   CREATININE 0.92 0.82   GLUCOSE 109* 83     Recent Labs     19  1000 19  0635   AST 24 28   ALT 22 25   BILITOT 0.5 0.9*   ALKPHOS 38 35       Current Facility-Administered Medications   Medication Dose Route Frequency Provider Last Rate Last Dose    clopidogrel (PLAVIX) tablet 75 mg  75 mg Oral Daily Hood Bishops, APRN - CNP        lactated ringers infusion   Intravenous Continuous Netta Gottlieb  mL/hr at 19 1210 1,000 mL at 19 1210    ondansetron (ZOFRAN) injection 4 mg  4 mg Intravenous Once PRN Tsering Liang MD        sterile water for irrigation    PRN Netta Gottlieb MD   1,000 mL at 19 1335    sodium chloride (PF) 0.9 % injection 10 mL  10 mL Intravenous PRN Abelino Santacruz Buck Singh MD        sodium chloride flush 0.9 % injection 10 mL  10 mL Intravenous 2 times per day Patrick Sal MD   10 mL at 04/04/19 0938    sodium chloride (PF) 0.9 % injection 10 mL  10 mL Intravenous PRN Patrick Sal MD        polyethylene glycol (GoLYTELY) solution 2,000 mL  2,000 mL Oral See Admin Instructions Patrick Sal MD   2,000 mL at 04/03/19 1758    predniSONE (DELTASONE) tablet 5 mg  5 mg Oral Daily Larene Phlegm, DO   5 mg at 04/03/19 1558    sodium chloride flush 0.9 % injection 10 mL  10 mL Intravenous PRN Patrick Sal MD        sodium chloride flush 0.9 % injection 10 mL  10 mL Intravenous 2 times per day Patrick Sal MD   10 mL at 04/02/19 2359    sodium chloride flush 0.9 % injection 10 mL  10 mL Intravenous PRN Patrick Sal MD        acetaminophen (TYLENOL) tablet 650 mg  650 mg Oral Q4H PRN Patrick Sal MD        [facility] injection 4 mg  4 mg Intravenous Q6H PRN Patrick Sal MD        pantoprazole (PROTONIX) injection 40 mg  40 mg Intravenous Q12H Patrick Sal MD   40 mg at 04/04/19 0138    And    sodium chloride (PF) 0.9 % injection 10 mL  10 mL Intravenous Q12H Patrick Sal MD   10 mL at 04/04/19 0143    sodium chloride flush 0.9 % injection 10 mL  10 mL Intravenous 2 times per day Patrick Sal MD   10 mL at 04/02/19 2155    atorvastatin (LIPITOR) tablet 80 mg  80 mg Oral Nightly Patrick Sal MD   80 mg at 04/03/19 2022    coenzyme Q10 capsule 100 mg  100 mg Oral BID Patrick Sal MD   100 mg at 04/03/19 2022    ezetimibe (ZETIA) tablet 10 mg  10 mg Oral Daily Patrick Sal MD   Stopped at 69/68/43 8234    folic acid (FOLVITE) tablet 1 mg  1 mg Oral Daily Patrick Sal MD   1 mg at 04/03/19 1415    vitamin D (CHOLECALCIFEROL) tablet 4,000 Units  4,000 Units Oral Daily Patrick Sal MD   4,000 Units at 04/03/19 1413    Tofacitinib Citrate TB24 1 tablet  1 tablet Oral Daily Patrick Sal MD   Stopped at 04/03/19 5319  leflunomide (ARAVA) tablet 20 mg  20 mg Oral Daily Cathryn Carrillo MD   Stopped at 04/03/19 0818    metoprolol (LOPRESSOR) injection 5 mg  5 mg Intravenous Q6H Cathryn Carrillo MD   5 mg at 04/04/19 3665    nitroglycerin (NITRO-BID) 2 % ointment 0.5 inch  0.5 inch Topical 4 times per day Cathryn Carrillo MD   0.5 inch at 04/04/19 5842     Assessment and Plan:          Acute Hospital issues:    # acute blood loss anemia due to GI bleed  - GI seen,  EGD negative, colonoscopy today, monitor H&H     # CAD s/p PCI with QIAN  - will need to restart antiplatelet ASAP due to recent PCI as per cardiology    # Bleeding (Hemorrhagic disorder) d/t   Xarelto/ Plavix present on admission  - as above     DVT/PPx- ordered if appropriate        DISCHARGE PLANNING  Pending GI w/u        Electronically signed by Tiffanie Johnston DO on 4/4/2019 at 2:31 PM

## 2019-04-04 NOTE — PROGRESS NOTES
Progress Note  Patient: Yeni Beyer  Unit/Bed: K973/A175-67  YOB: 1950  MRN: [id number]  Acct: [de-identified]   Admitting Diagnosis: GI bleed [K92.2]  Admit Date:  2019  Hospital Day: 2    Chief Complaint: GIB CAD    Histories:  Past Medical History:   Diagnosis Date    Depression     History of non-ST elevation myocardial infarction (NSTEMI) 10/19/2018    Overview:  H POA Recent  Chest pain with sweats A CE+ Loaded with plavix  for cath with Dr Romain Darling yesterday PCI QIAN to RCA P Continue ASA, Plavix and xarelot for 1 month then DC ASA,continue on  BB and increased lipitor dose    Hyperlipidemia     Hypertension     Osteoarthritis     RA (rheumatoid arthritis) ([address]     [facility] Dr. Yris Campa Rheumatoid arthritis(714.0)      Past Surgical History:   Procedure Laterality Date    FRACTURE SURGERY  2004    OPEN REDUCTION LEFT LEG    UPPER GASTROINTESTINAL ENDOSCOPY N/A 11/10/2018    EGD ESOPHAGOGASTRODUODENOSCOPY performed by Noemi Lux MD at [facility] N/A 4/3/2019    EGD ESOPHAGOGASTRODUODENOSCOPY performed by Noemi Lux MD at [address] History   Problem Relation Age of Onset    High Blood Pressure Father     Heart Attack Father      Social History     Socioeconomic History    Marital status:      Spouse name: None    Number of children: None    Years of education: None    Highest education level: None   Occupational History    None   Social Needs    Financial resource strain: None    Food insecurity:     Worry: None     Inability: None    Transportation needs:     Medical: None     Non-medical: None   Tobacco Use    Smoking status: Former Smoker     Packs/day: 1.00     Years: 45.00     Pack years: 45.00     Types: Cigarettes     Last attempt to quit: 2017     Years since quittin.3    Smokeless tobacco: Never Used   Substance and Sexual Activity    Alcohol use: No    Drug use: No    Sexual activity: Not Currently   Lifestyle    Physical activity:     Days per week: None     Minutes per session: None    Stress: None   Relationships    Social connections:     Talks on phone: None     Gets together: None     Attends Confucianism service: None     Active member of club or organization: None     Attends meetings of clubs or organizations: None     Relationship status: None    Intimate partner violence:     Fear of current or ex partner: None     Emotionally abused: None     Physically abused: None     Forced sexual activity: None   Other Topics Concern    None   Social History Narrative    LIVES W WIFE     4/4/19  Pt denies chest pain, shortness of breath, nausea, vomiting, diarrhea, constipation, motor weakness, insomnia, weight loss, PND, orthopnea, or claudication. Denies rectal bleeding this am  Plan for colonoscopy today        Review of Systems:   Review of Systems   Constitutional: Negative. Negative for diaphoresis and fatigue. HENT: Negative. Eyes: Negative. Respiratory: Negative. Negative for cough, chest tightness, shortness of breath, wheezing and stridor. Cardiovascular: Negative. Negative for chest pain, palpitations and leg swelling. Gastrointestinal: Positive for blood in stool. Negative for nausea. Genitourinary: Negative. Musculoskeletal: Negative. Skin: Negative. Neurological: Negative. Negative for dizziness, syncope, weakness and light-headedness. Hematological: Negative. Psychiatric/Behavioral: Negative. Physical Examination:    BP (!) 174/81   Pulse 76   Temp 97.4 °F (36.3 °C)   Resp 20   Ht 5' 10\" (1.778 m)   Wt 205 lb (93 kg)   SpO2 99%   BMI 29.41 kg/m²    Physical Exam   Constitutional: He appears healthy. No distress. HENT:   Normal cephalic and Atraumatic   Eyes: Pupils are equal, round, and reactive to light. Neck: Normal range of motion and thyroid normal. Neck supple. No JVD present. No neck adenopathy. No thyromegaly present. Cardiovascular: Normal rate, regular rhythm, intact distal pulses and normal pulses. Murmur heard. Pulmonary/Chest: Effort normal and breath sounds normal. He has no wheezes. He has no rales. He exhibits no tenderness. Abdominal: Soft. Bowel sounds are normal. There is no tenderness. Musculoskeletal: Normal range of motion. He exhibits no edema or tenderness. Neurological: He is alert and oriented to person, place, and time. Skin: Skin is warm. No cyanosis. There is pallor. Nails show no clubbing.        LABS:  CBC:   Lab Results   Component Value Date    WBC 6.2 04/03/2019    RBC 3.39 04/03/2019    RBC 4.38 04/26/2012    HGB 9.0 04/04/2019    HCT 28.6 04/04/2019    MCV 76.7 04/03/2019    MCH 24.1 04/03/2019    MCHC 31.4 04/03/2019    RDW 25.5 04/03/2019     04/03/2019    MPV 8.3 04/26/2012     CBC with Differential:    Lab Results   Component Value Date    WBC 6.2 04/03/2019    RBC 3.39 04/03/2019    RBC 4.38 04/26/2012    HGB 9.0 04/04/2019    HCT 28.6 04/04/2019     04/03/2019    MCV 76.7 04/03/2019    MCH 24.1 04/03/2019    MCHC 31.4 04/03/2019    RDW 25.5 04/03/2019    LYMPHOPCT 18.9 04/03/2019    MONOPCT 7.5 04/03/2019    BASOPCT 0.9 04/03/2019    MONOSABS 0.5 04/03/2019    LYMPHSABS 1.2 04/03/2019    EOSABS 0.2 04/03/2019    BASOSABS 0.1 04/03/2019     CMP:    Lab Results   Component Value Date     04/03/2019    K 4.3 04/03/2019     04/03/2019    CO2 22 04/03/2019    BUN 11 04/03/2019    CREATININE 0.82 04/03/2019    GFRAA >60.0 04/03/2019    LABGLOM >60.0 04/03/2019    GLUCOSE 83 04/03/2019    GLUCOSE 95 04/26/2012    PROT 6.0 04/03/2019    LABALBU 3.8 04/03/2019    LABALBU 4.5 04/26/2012    CALCIUM 8.5 04/03/2019    BILITOT 0.9 04/03/2019    ALKPHOS 35 04/03/2019    AST 28 04/03/2019    ALT 25 04/03/2019     BMP:    Lab Results   Component Value Date     04/03/2019    K 4.3 04/03/2019     04/03/2019    CO2 22 04/03/2019    BUN 11 04/03/2019    LABALBU 3.8 04/03/2019    LABALBU 4.5 04/26/2012    CREATININE 0.82 04/03/2019    CALCIUM 8.5 04/03/2019    GFRAA >60.0 04/03/2019    LABGLOM >60.0 04/03/2019    GLUCOSE 83 04/03/2019    GLUCOSE 95 04/26/2012     Magnesium:    Lab Results   Component Value Date    MG 1.8 02/27/2019     Troponin:    Lab Results   Component Value Date    TROPONINI <0.010 04/02/2019        Active Hospital Problems    Diagnosis Date Noted    Anemia due to acute blood loss [D62]      Priority: High    GI bleed [K92.2] 04/02/2019        Assessment/Plan:  GI BLEED- colonoscopy today  CAD- resume plavix today  Okay to hold xarelto  HTN- uncontrolled  Dyslipidemia- on lipitor             Electronically signed by JANES Dunham - CNP on 4/4/2019 at 10:19 AM

## 2019-04-04 NOTE — ANESTHESIA PRE PROCEDURE
Department of Anesthesiology  Preprocedure Note       Name:  King Ronan   Age:  79 y.o.  :  1950                                          MRN:  [id number]         Date:  2019      Surgeon: Kena Thorpe):  Carissa Ayala MD    Procedure: COLONOSCOPY room 477 (N/A )    Medications prior to admission:   Prior to Admission medications    Medication Sig Start Date End Date Taking? Authorizing Provider   ezetimibe (ZETIA) 10 MG tablet Take 10 mg by mouth daily   Yes Historical Provider, MD   metoprolol tartrate (LOPRESSOR) 50 MG tablet Take 1.5 tablets by mouth 2 times daily 19  Yes Corky Zazueta MD   atorvastatin (LIPITOR) 80 MG tablet Take 80 mg by mouth 19 Yes Historical Provider, MD   methotrexate (RHEUMATREX) 2.5 MG chemo tablet TAKE 10 TABLETS ONCE A WEEK WITH FOOD - NO ALCOHOL AND HOLD IF ON ANTIBIOTICS OR IF ILL 18  Yes Historical Provider, MD   Coenzyme Q10 (COQ-10) 50 MG CAPS Take 50 mg by mouth 2 times daily   Yes Historical Provider, MD   rivaroxaban (XARELTO) 20 MG TABS tablet Take 1 tablet by mouth daily 18  Yes Cassy Schwartz APRN - CNP   clopidogrel (PLAVIX) 75 MG tablet Take 75 mg by mouth daily   Yes Historical Provider, MD   esomeprazole Magnesium (NEXIUM) 40 MG PACK Take 40 mg by mouth daily   Yes Historical Provider, MD   folic acid (FOLVITE) 1 MG tablet Take 1 mg by mouth daily   Yes Historical Provider, MD   lisinopril (PRINIVIL;ZESTRIL) 10 MG tablet Take 10 mg by mouth daily   Yes Historical Provider, MD   predniSONE (DELTASONE) 5 MG tablet Take 5 mg by mouth daily   Yes Historical Provider, MD   Tofacitinib Citrate 11 MG TB24 Take 1 tablet by mouth daily   Yes Historical Provider, MD   leflunomide (ARAVA) 20 MG tablet Take 1 tablet by mouth daily.  13  Yes Alcira Mosley MD   VITAMIN D, CHOLECALCIFEROL, PO Take 4,000 Int'l Units by mouth daily    Yes Historical Provider, MD       Current medications:    Current Facility-Administered Medications Medication Dose Route Frequency Provider Last Rate Last Dose    clopidogrel (PLAVIX) tablet 75 mg  75 mg Oral Daily Peggy Horton, APRN - CNP        lactated ringers infusion   Intravenous Continuous José Miguel Basilio  mL/hr at 04/04/19 1210 1,000 mL at 04/04/19 1210    sodium chloride (PF) 0.9 % injection 10 mL  10 mL Intravenous PRN José Miguel Basilio MD        sodium chloride flush 0.9 % injection 10 mL  10 mL Intravenous 2 times per day José Miguel Basilio MD   10 mL at 04/04/19 3262    sodium chloride (PF) 0.9 % injection 10 mL  10 mL Intravenous PRN José Miguel Basilio MD        polyethylene glycol (GoLYTELY) solution 2,000 mL  2,000 mL Oral See Admin Instructions José Miguel Basilio MD   2,000 mL at 04/03/19 1758    predniSONE (DELTASONE) tablet 5 mg  5 mg Oral Daily Celestino Latus, DO   5 mg at 04/03/19 1558    sodium chloride flush 0.9 % injection 10 mL  10 mL Intravenous PRN José Miguel Basilio MD        sodium chloride flush 0.9 % injection 10 mL  10 mL Intravenous 2 times per day José Miguel Basilio MD   10 mL at 04/02/19 2359    sodium chloride flush 0.9 % injection 10 mL  10 mL Intravenous PRN José Miguel Basilio MD        acetaminophen (TYLENOL) tablet 650 mg  650 mg Oral Q4H PRN José Miguel Basilio MD        ondansetron [facility]) injection 4 mg  4 mg Intravenous Q6H PRN José Miguel Basilio MD        pantoprazole (PROTONIX) injection 40 mg  40 mg Intravenous Q12H José Miguel Basilio MD   40 mg at 04/04/19 0138    And    sodium chloride (PF) 0.9 % injection 10 mL  10 mL Intravenous Q12H José Miguel Basilio MD   10 mL at 04/04/19 0143    sodium chloride flush 0.9 % injection 10 mL  10 mL Intravenous 2 times per day José Miguel Basilio MD   10 mL at 04/02/19 2155    atorvastatin (LIPITOR) tablet 80 mg  80 mg Oral Nightly José Miguel Basilio MD   80 mg at 04/03/19 2022    coenzyme Q10 capsule 100 mg  100 mg Oral BID José Miguel Basilio MD   100 mg at 04/03/19 2022    ezetimibe (ZETIA) tablet 10 mg  10 mg Oral Daily José Miguel Basilio, MD   Stopped at 57/90/01 9036    folic acid (FOLVITE) tablet 1 mg  1 mg Oral Daily Dawne Sicard, MD   1 mg at 04/03/19 1415    vitamin D (CHOLECALCIFEROL) tablet 4,000 Units  4,000 Units Oral Daily Dawne Sicard, MD   4,000 Units at 04/03/19 1413    Tofacitinib Citrate TB24 1 tablet  1 tablet Oral Daily Dawne Sicard, MD   Stopped at 04/03/19 0819    leflunomide (ARAVA) tablet 20 mg  20 mg Oral Daily Dawne Sicard, MD   Stopped at 04/03/19 0818    metoprolol (LOPRESSOR) injection 5 mg  5 mg Intravenous Q6H Dawne Sicard, MD   5 mg at 04/04/19 0938    nitroglycerin (NITRO-BID) 2 % ointment 0.5 inch  0.5 inch Topical 4 times per day Dawne Sicard, MD   0.5 inch at 04/04/19 8728       Allergies:     Allergies   Allergen Reactions    Antihistamine [Diphenhydramine Hcl] Other (See Comments)     jittery       Problem List:    Patient Active Problem List   Diagnosis Code    Essential hypertension I10    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 ([facility]) M05.79    Seasonal allergic rhinitis J30.2    Chronic sinusitis J32.9    Acute blood loss anemia D62    Gastrointestinal hemorrhage K92.2    Depression F32.9    Lipid disorder E78.9    Paroxysmal atrial fibrillation with RVR ([facility]) I48.0    Post PTCA Z98.61    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, T38.0X5A    Vitamin D deficiency E55.9    Synovitis M65.9    PE (pulmonary thromboembolism) ([facility]) I26.99    History of non-ST elevation myocardial infarction (NSTEMI) I25.2    Long-term use of high-risk medication Z79.899    Methotrexate, long term, current use Z79.899    Nicotine dependence, uncomplicated B90.175    Ventricular tachycardia (HCC) I47.2    GI bleed K92.2    Anemia due to acute blood loss D62       Past Medical History:        Diagnosis Date    Depression 2007    History of non-ST elevation myocardial infarction (NSTEMI) 10/19/2018    Overview:  H POA Recent  Chest pain with sweats A CE+ Loaded with plavix  for cath with Dr April Lopez yesterday PCI QIAN to RCA P Continue ASA, Plavix and xarelot for 1 month then DC ASA,continue on  BB and increased lipitor dose    Hyperlipidemia     Hypertension     Osteoarthritis     RA (rheumatoid arthritis) ([address]     [facility] Dr. Michelle Peraza Rheumatoid arthritis(714.0)        Past Surgical History:        Procedure Laterality Date    FRACTURE SURGERY  2004    OPEN REDUCTION LEFT LEG    UPPER GASTROINTESTINAL ENDOSCOPY N/A 11/10/2018    EGD ESOPHAGOGASTRODUODENOSCOPY performed by Griselda Sas, MD at [address] [place] [address] N/A 4/3/2019    EGD ESOPHAGOGASTRODUODENOSCOPY performed by Griselda Sas, MD at [place] [address] History:    Social History     Tobacco Use    Smoking status: Former Smoker     Packs/day: 1.00     Years: 45.00     Pack years: 45.00     Types: Cigarettes     Last attempt to quit: 2017     Years since quittin.3    Smokeless tobacco: Never Used   Substance Use Topics    Alcohol use: No                                Counseling given: Not Answered      Vital Signs (Current):   Vitals:    19 0819 19 0823 19 0937 19 1035   BP: (!) 155/79 (!) 174/94 (!) 174/81 (!) 151/82   Pulse: 87 108 76 73   Resp:       Temp:       TempSrc:       SpO2: 97% 99%     Weight:       Height:                                                  BP Readings from Last 3 Encounters:   19 (!) 151/82   19 (!) 143/67   19 118/62       NPO Status: Time of last liquid consumption: 0000                        Time of last solid consumption: 0000                        Date of last liquid consumption: 19                        Date of last solid food consumption: 19    BMI:   Wt Readings from Last 3 Encounters:   19 205 lb (93 kg)   19 204 lb (92.5 kg)   19 200 lb (90.7 kg)     Body mass index is 29.41 kg/m².     CBC:   Lab Results   Component Value Date    WBC 6.2 04/03/2019    RBC 3.39 04/03/2019    RBC 4.38 04/26/2012    HGB 9.0 04/04/2019    HCT 28.6 04/04/2019    MCV 76.7 04/03/2019    RDW 25.5 04/03/2019     04/03/2019       CMP:   Lab Results   Component Value Date     04/03/2019    K 4.3 04/03/2019     04/03/2019    CO2 22 04/03/2019    BUN 11 04/03/2019    CREATININE 0.82 04/03/2019    GFRAA >60.0 04/03/2019    LABGLOM >60.0 04/03/2019    GLUCOSE 83 04/03/2019    GLUCOSE 95 04/26/2012    PROT 6.0 04/03/2019    CALCIUM 8.5 04/03/2019    BILITOT 0.9 04/03/2019    ALKPHOS 35 04/03/2019    AST 28 04/03/2019    ALT 25 04/03/2019       POC Tests: No results for input(s): POCGLU, POCNA, POCK, POCCL, POCBUN, POCHEMO, POCHCT in the last 72 hours. Coags:   Lab Results   Component Value Date    PROTIME 12.4 04/02/2019    INR 1.2 04/02/2019    APTT 21.9 11/09/2018       HCG (If Applicable): No results found for: PREGTESTUR, PREGSERUM, HCG, HCGQUANT     ABGs: No results found for: PHART, PO2ART, CSU1ZSE, RUN3MES, BEART, F5PJGPBD     Type & Screen (If Applicable):  No results found for: [facility]    Anesthesia Evaluation  Patient summary reviewed and Nursing notes reviewed no history of anesthetic complications:   Airway: Mallampati: II  TM distance: >3 FB   Neck ROM: full  Mouth opening: > = 3 FB Dental:          Pulmonary:Negative Pulmonary ROS                              Cardiovascular:    (+) hypertension:,       ECG reviewed               : held for medical reason; s/p bowel prep/NPO. Neuro/Psych:   (+) psychiatric history:            GI/Hepatic/Renal: Neg GI/Hepatic/Renal ROS            Endo/Other:    (+) blood dyscrasia: anemia, arthritis:., .                 Abdominal:           Vascular: negative vascular ROS. Anesthesia Plan      MAC     ASA 3       Induction: intravenous. Anesthetic plan and risks discussed with patient and spouse. Plan discussed with CRNA.     Attending anesthesiologist reviewed and agrees with Daiana Barragan MD   4/4/2019

## 2019-04-04 NOTE — PROGRESS NOTES
Currently   Lifestyle    Physical activity:     Days per week: None     Minutes per session: None    Stress: None   Relationships    Social connections:     Talks on phone: None     Gets together: None     Attends Pentecostalism service: None     Active member of club or organization: None     Attends meetings of clubs or organizations: None     Relationship status: None    Intimate partner violence:     Fear of current or ex partner: None     Emotionally abused: None     Physically abused: None     Forced sexual activity: None   Other Topics Concern    None   Social History Narrative    LIVES W WIFE     4/4/19  Pt denies chest pain, shortness of breath, nausea, vomiting, diarrhea, constipation, motor weakness, insomnia, weight loss, PND, orthopnea, or claudication. Denies rectal bleeding this am  Plan for colonoscopy today        Review of Systems:   Review of Systems   Constitutional: Negative. Negative for diaphoresis and fatigue. HENT: Negative. Eyes: Negative. Respiratory: Negative. Negative for cough, chest tightness, shortness of breath, wheezing and stridor. Cardiovascular: Negative. Negative for chest pain, palpitations and leg swelling. Gastrointestinal: Positive for blood in stool. Negative for nausea. Genitourinary: Negative. Musculoskeletal: Negative. Skin: Negative. Neurological: Negative. Negative for dizziness, syncope, weakness and light-headedness. Hematological: Negative. Psychiatric/Behavioral: Negative. Physical Examination:    BP (!) 174/81   Pulse 76   Temp 97.4 °F (36.3 °C)   Resp 20   Ht 5' 10\" (1.778 m)   Wt 205 lb (93 kg)   SpO2 99%   BMI 29.41 kg/m²    Physical Exam   Constitutional: He appears healthy. No distress. HENT:   Normal cephalic and Atraumatic   Eyes: Pupils are equal, round, and reactive to light. Neck: Normal range of motion and thyroid normal. Neck supple. No JVD present. No neck adenopathy. No thyromegaly present. Cardiovascular: Normal rate, regular rhythm, intact distal pulses and normal pulses. Murmur heard. Pulmonary/Chest: Effort normal and breath sounds normal. He has no wheezes. He has no rales. He exhibits no tenderness. Abdominal: Soft. Bowel sounds are normal. There is no tenderness. Musculoskeletal: Normal range of motion. He exhibits no edema or tenderness. Neurological: He is alert and oriented to person, place, and time. Skin: Skin is warm. No cyanosis. There is pallor. Nails show no clubbing.        LABS:  CBC:   Lab Results   Component Value Date    WBC 6.2 04/03/2019    RBC 3.39 04/03/2019    RBC 4.38 04/26/2012    HGB 9.0 04/04/2019    HCT 28.6 04/04/2019    MCV 76.7 04/03/2019    MCH 24.1 04/03/2019    MCHC 31.4 04/03/2019    RDW 25.5 04/03/2019     04/03/2019    MPV 8.3 04/26/2012     CBC with Differential:    Lab Results   Component Value Date    WBC 6.2 04/03/2019    RBC 3.39 04/03/2019    RBC 4.38 04/26/2012    HGB 9.0 04/04/2019    HCT 28.6 04/04/2019     04/03/2019    MCV 76.7 04/03/2019    MCH 24.1 04/03/2019    MCHC 31.4 04/03/2019    RDW 25.5 04/03/2019    LYMPHOPCT 18.9 04/03/2019    MONOPCT 7.5 04/03/2019    BASOPCT 0.9 04/03/2019    MONOSABS 0.5 04/03/2019    LYMPHSABS 1.2 04/03/2019    EOSABS 0.2 04/03/2019    BASOSABS 0.1 04/03/2019     CMP:    Lab Results   Component Value Date     04/03/2019    K 4.3 04/03/2019     04/03/2019    CO2 22 04/03/2019    BUN 11 04/03/2019    CREATININE 0.82 04/03/2019    GFRAA >60.0 04/03/2019    LABGLOM >60.0 04/03/2019    GLUCOSE 83 04/03/2019    GLUCOSE 95 04/26/2012    PROT 6.0 04/03/2019    LABALBU 3.8 04/03/2019    LABALBU 4.5 04/26/2012    CALCIUM 8.5 04/03/2019    BILITOT 0.9 04/03/2019    ALKPHOS 35 04/03/2019    AST 28 04/03/2019    ALT 25 04/03/2019     BMP:    Lab Results   Component Value Date     04/03/2019    K 4.3 04/03/2019     04/03/2019    CO2 22 04/03/2019    BUN 11 04/03/2019    LABALBU 3.8 04/03/2019    LABALBU 4.5 04/26/2012    CREATININE 0.82 04/03/2019    CALCIUM 8.5 04/03/2019    GFRAA >60.0 04/03/2019    LABGLOM >60.0 04/03/2019    GLUCOSE 83 04/03/2019    GLUCOSE 95 04/26/2012     Magnesium:    Lab Results   Component Value Date    MG 1.8 02/27/2019     Troponin:    Lab Results   Component Value Date    TROPONINI <0.010 04/02/2019        Active Hospital Problems    Diagnosis Date Noted    Anemia due to acute blood loss [D62]      Priority: High    GI bleed [K92.2] 04/02/2019        Assessment/Plan:  GI BLEED- colonoscopy today  CAD- resume plavix today  Okay to hold xarelto  HTN- uncontrolled  Dyslipidemia- on lipitor             Electronically signed by JANES Dunham CNP on 4/4/2019 at 10:19 AM    Attending Supervising Physician's R Ladonna Barron [id number]  I performed a history and physical examination on the patient and discussed the management with the nurse practicioner. I reviewed and agree with the findings and plan as documented in his note . History element: ***  Physical element: *** NAD, RRR, CTA b, soft, no edema, nonfocal  Plan element: ***     Rosendo Morris MD [facility] Director of Cardiology Services and Cardiac Catheterization Laboratory  [facility], [facility]

## 2019-04-04 NOTE — PROGRESS NOTES
Discharge instructions reviewed with patient and his wife. All questions answered, belongings with patient.

## 2019-04-04 NOTE — PLAN OF CARE
Problem: Bleeding:  Goal: Will show no signs and symptoms of excessive bleeding  Description  Will show no signs and symptoms of excessive bleeding  Outcome: Ongoing

## 2019-04-04 NOTE — CARE COORDINATION
Met with Mrs at bedside. Patient is not a Medicare recipient therefore  IMM is not needed. Patient had EDG 4/3 and colonoscopy today. Plan to discharge back home with family when medically stable. Hgb 9.0 today.   Care Coordination will continue to follow Electronically signed by Donovan Hunt RN on 4/4/2019 at 3:24 PM

## 2019-04-04 NOTE — DISCHARGE SUMMARY
Hospital Medicine Discharge Summary    Rosey Cook  :  1950  MRN:  [id number]    Admit date:  2019  Discharge date:  2019    Admitting Physician:  Randy Gil DO  Primary Care Physician:  Herminia Quan MD      Discharge Diagnoses: Active Problems:    Anemia due to acute blood loss    GI bleed  Resolved Problems:    * No resolved hospital problems. *    Chief Complaint   Patient presents with    Abnormal Test Results     6.7 hgb from [facility] blood test per patient. pale and weakness per patient. Hospital Course:   Rosey Cook is a 79 y.o. male that was admitted and treated at [facility] for the following medical issues: Active Problems:    Anemia due to acute blood loss    GI bleed  Resolved Problems:    * No resolved hospital problems. *    53-year-old male with past medical history of CAD status post PCI recently on Plavix also on anticoagulation for A. fib who was admitted with GI bleed. He had an EGD that did not show active bleeding or any abnormalities. He also had a colonoscopy done that showed diverticulosis in the sigmoid colon without active bleeding. His Plavix was resumed as his hemoglobin was stable as per cardiology and GI recommendation. He remained hemodynamically stable and was discharged home off his or also until further GI workup which will include a capsule endoscopy procedure. Pt was discharge in a stable condition. Exam on discharge:   BP (!) 144/70   Pulse 77   Temp 97.6 °F (36.4 °C) (Temporal)   Resp 14   Ht 5' 10\" (1.778 m)   Wt 205 lb (93 kg)   SpO2 98%   BMI 29.41 kg/m²   General appearance: No apparent distress, appears stated age and cooperative. HEENT: Pupils equal, round, and reactive to light. Conjunctivae/corneas clear. Neck: Supple, with full range of motion. No jugular venous distention. Trachea midline. Respiratory:  Normal respiratory effort.  Clear to auscultation, bilaterally without Rales/Wheezes/Rhonchi. Cardiovascular: Regular rate and rhythm with normal S1/S2 without murmurs, rubs or gallops. Abdomen: Soft, non-tender, non-distended with normal bowel sounds. Musculoskeletal: No clubbing, cyanosis or edema bilaterally. Full range of motion without deformity. Skin: Skin color, texture, turgor normal.  No rashes or lesions. Neuro: Non Focal. Symetrical motor and tone. Nl Comprehension, Alert,awake and oriented. NL CN. Symetrical tone and reflexes. Psychiatric: Alert and oriented, thought content appropriate, normal insight  Capillary Refill: Brisk,< 3 seconds   Peripheral Pulses: +2 palpable, equal bilaterally     Patient was seen by the following consultants while admitted to [facility]:   Consults:  IP CONSULT TO HOSPITALIST  IP CONSULT TO CARDIOLOGY  IP CONSULT TO GI    Significant Diagnostic Studies:    Xr Chest Portable    Result Date: 4/2/2019  XR CHEST PORTABLE: 4/2/2019 CLINICAL HISTORY:  sob . COMPARISON: 2/27/2019. Two portable upright AP radiographs of the chest were obtained. FINDINGS: Hyperinflation and coarsening of the bronchovascular structures consistent with COPD appears unchanged. There are no developing infiltrates, pleural effusion, cardiomegaly, vascular congestion, pneumothorax, or displaced fractures identified. NO EVIDENCE OF ACTIVE CARDIOPULMONARY DISEASE.        Discharge Medications:       Ross Barbour   Home Medication Instructions TONAY:[id number]    Printed on:04/04/19 1532   Medication Information                      atorvastatin (LIPITOR) 80 MG tablet  Take 80 mg by mouth             clopidogrel (PLAVIX) 75 MG tablet  Take 75 mg by mouth daily             Coenzyme Q10 (COQ-10) 50 MG CAPS  Take 50 mg by mouth 2 times daily             esomeprazole Magnesium (NEXIUM) 40 MG PACK  Take 40 mg by mouth daily             ezetimibe (ZETIA) 10 MG tablet  Take 10 mg by mouth daily             folic acid (FOLVITE) 1 MG tablet  Take 1 mg

## 2019-04-04 NOTE — DISCHARGE INSTR - DIET

## 2019-04-08 ENCOUNTER — APPOINTMENT (OUTPATIENT)
Dept: CARDIAC REHAB | Age: 69
End: 2019-04-08
Payer: COMMERCIAL

## 2019-04-10 ENCOUNTER — HOSPITAL ENCOUNTER (OUTPATIENT)
Dept: CARDIAC REHAB | Age: 69
Setting detail: THERAPIES SERIES
Discharge: HOME | End: 2019-04-10
Payer: COMMERCIAL

## 2019-04-10 PROCEDURE — 93798 PHYS/QHP OP CAR RHAB W/ECG: CPT

## 2019-04-15 ENCOUNTER — OFFICE VISIT (OUTPATIENT)
Dept: GASTROENTEROLOGY | Age: 69
End: 2019-04-15
Payer: COMMERCIAL

## 2019-04-15 VITALS
HEIGHT: 70 IN | HEART RATE: 55 BPM | BODY MASS INDEX: 29.63 KG/M2 | TEMPERATURE: 97.3 F | SYSTOLIC BLOOD PRESSURE: 126 MMHG | DIASTOLIC BLOOD PRESSURE: 84 MMHG | OXYGEN SATURATION: 96 % | WEIGHT: 207 LBS

## 2019-04-15 DIAGNOSIS — D64.9 ANEMIA, UNSPECIFIED TYPE: Primary | ICD-10-CM

## 2019-04-15 PROCEDURE — 99212 OFFICE O/P EST SF 10 MIN: CPT | Performed by: SPECIALIST

## 2019-04-15 ASSESSMENT — ENCOUNTER SYMPTOMS
BLOOD IN STOOL: 0
NAUSEA: 0
GASTROINTESTINAL NEGATIVE: 1
ABDOMINAL PAIN: 0
EYES NEGATIVE: 1
VOMITING: 0
RECTAL PAIN: 0
RESPIRATORY NEGATIVE: 1
ABDOMINAL DISTENTION: 0
ANAL BLEEDING: 0
CONSTIPATION: 0
DIARRHEA: 0

## 2019-04-15 NOTE — PROGRESS NOTES
Gastroenterology Clinic Follow up Visit    Madyson Meneses  [id number]  Chief Complaint   Patient presents with    Follow-up       HPI and A/P at last visit summarized below:      Review of Systems   Constitutional: Negative. HENT: Negative. Eyes: Negative. Respiratory: Negative. Gastrointestinal: Negative. Negative for abdominal distention, abdominal pain, anal bleeding, blood in stool, constipation, diarrhea, nausea, rectal pain and vomiting. Genitourinary: Negative. Musculoskeletal: Negative. Neurological: Negative. Psychiatric/Behavioral: Negative. Pt is here for follow up. Had egd and colonscopy for evaluation of gi bleeding, stool was normal, going for capsule endoscopy in 2 days, feels better after transfusion    Past medical history, past surgical history, medication list, social and familyhistory reviewed    Blood pressure 126/84, pulse 55, temperature 97.3 °F (36.3 °C), height 5' 10\" (1.778 m), weight 207 lb (93.9 kg), SpO2 96 %. Physical Exam   Constitutional: He appears well-developed and well-nourished. HENT:   Head: Normocephalic. Mouth/Throat: Oropharynx is clear and moist.   Eyes: Pupils are equal, round, and reactive to light. Cardiovascular: Normal rate, regular rhythm, normal heart sounds and intact distal pulses. Pulmonary/Chest: Effort normal and breath sounds normal.   Abdominal: Soft. Bowel sounds are normal.   Neurological: He is alert. Skin: Skin is warm and dry. Psychiatric: He has a normal mood and affect.        Laboratory, Pathology, Radiology reviewed in detail with relevantimportant investigations summarized below:    Recent Labs     04/04/19  0617 04/03/19  0635 04/02/19  1557 04/02/19  1000   WBC  --  6.2  --  6.5   HGB 9.0* 8.2* 6.9* 6.5*   HCT 28.6* 26.0* 22.1* 20.7*   MCV  --  76.7*  --  72.9*   PLT  --  161  --  206     Lab Results   Component Value Date    ALT 25 04/03/2019    AST 28 04/03/2019    ALKPHOS 35 04/03/2019    BILITOT 0.9 LEFT INTERNAL CAROTID ARTERY. FURTHER EVALUATION WITH NECK CTA IS SUGGESTED, AS CLINICALLY WARRANTED. Endoscopic investigations:     Assessment and Plan:Anemia etiology un clear,possible small bowel source,will wait for capsule endoscopy studies. , pt is on PLavix,off Debbi Kimmy 79 y.o. male for follow up. Diagnosis Orders   1. Anemia, unspecified type         Return in about 3 years (around 4/15/2022) for Follow Up Visit. Ermelinda Patel MD   StaffGastroenterologist  [facility]    Please note this report has been partially produced using speech recognitionsoftware  and may cause contain errors related to that system including grammar, punctuation and spelling as well as words andphrases that may seem inappropriate. If there are questions or concerns please feel free to contact me to clarify.

## 2019-04-17 ENCOUNTER — APPOINTMENT (OUTPATIENT)
Dept: ENDOSCOPY | Age: 69
End: 2019-04-17
Payer: COMMERCIAL

## 2019-04-17 ENCOUNTER — OFFICE VISIT (OUTPATIENT)
Dept: CARDIOLOGY CLINIC | Age: 69
End: 2019-04-17
Payer: COMMERCIAL

## 2019-04-17 VITALS
DIASTOLIC BLOOD PRESSURE: 72 MMHG | OXYGEN SATURATION: 98 % | RESPIRATION RATE: 16 BRPM | BODY MASS INDEX: 29.06 KG/M2 | HEART RATE: 62 BPM | WEIGHT: 203 LBS | SYSTOLIC BLOOD PRESSURE: 134 MMHG | HEIGHT: 70 IN

## 2019-04-17 DIAGNOSIS — K27.4 GASTROINTESTINAL HEMORRHAGE ASSOCIATED WITH PEPTIC ULCER: ICD-10-CM

## 2019-04-17 DIAGNOSIS — I25.2 HISTORY OF NON-ST ELEVATION MYOCARDIAL INFARCTION (NSTEMI): ICD-10-CM

## 2019-04-17 DIAGNOSIS — I10 ESSENTIAL HYPERTENSION: Primary | ICD-10-CM

## 2019-04-17 DIAGNOSIS — I47.20 VENTRICULAR TACHYCARDIA: ICD-10-CM

## 2019-04-17 DIAGNOSIS — E78.9 LIPID DISORDER: ICD-10-CM

## 2019-04-17 DIAGNOSIS — I48.0 PAROXYSMAL ATRIAL FIBRILLATION WITH RVR (HCC): ICD-10-CM

## 2019-04-17 PROCEDURE — 99213 OFFICE O/P EST LOW 20 MIN: CPT | Performed by: INTERNAL MEDICINE

## 2019-04-17 PROCEDURE — 3609019000 HC EGD CAPSULE ENDOSCOPY

## 2019-04-17 NOTE — PROGRESS NOTES
Social Needs    Financial resource strain: None    Food insecurity:     Worry: None     Inability: None    Transportation needs:     Medical: None     Non-medical: None   Tobacco Use    Smoking status: Former Smoker     Packs/day: 1.00     Years: 45.00     Pack years: 45.00     Types: Cigarettes     Last attempt to quit: 2017     Years since quittin.4    Smokeless tobacco: Never Used   Substance and Sexual Activity    Alcohol use: No    Drug use: No    Sexual activity: Not Currently   Lifestyle    Physical activity:     Days per week: None     Minutes per session: None    Stress: None   Relationships    Social connections:     Talks on phone: None     Gets together: None     Attends Gnosticism service: None     Active member of club or organization: None     Attends meetings of clubs or organizations: None     Relationship status: None    Intimate partner violence:     Fear of current or ex partner: None     Emotionally abused: None     Physically abused: None     Forced sexual activity: None   Other Topics Concern    None   Social History Narrative    LIVES W WIFE       Allergies   Allergen Reactions    Antihistamine [Diphenhydramine Hcl] Other (See Comments)     jittery       Current Outpatient Medications   Medication Sig Dispense Refill    Cyanocobalamin (B-12 PO) Take by mouth      Multiple Vitamins-Minerals (MULTIVITAMIN ADULT PO) Take by mouth      ezetimibe (ZETIA) 10 MG tablet Take 10 mg by mouth daily      metoprolol tartrate (LOPRESSOR) 50 MG tablet Take 1.5 tablets by mouth 2 times daily 60 tablet 0    atorvastatin (LIPITOR) 80 MG tablet Take 80 mg by mouth      methotrexate (RHEUMATREX) 2.5 MG chemo tablet TAKE 10 TABLETS ONCE A WEEK WITH FOOD - NO ALCOHOL AND HOLD IF ON ANTIBIOTICS OR IF ILL      Coenzyme Q10 (COQ-10) 50 MG CAPS Take 50 mg by mouth 2 times daily      clopidogrel (PLAVIX) 75 MG tablet Take 75 mg by mouth daily      esomeprazole Magnesium (NEXIUM) 40 MG PACK Take 40 mg by mouth daily      folic acid (FOLVITE) 1 MG tablet Take 1 mg by mouth daily      lisinopril (PRINIVIL;ZESTRIL) 10 MG tablet Take 10 mg by mouth daily      predniSONE (DELTASONE) 5 MG tablet Take 5 mg by mouth daily      Tofacitinib Citrate 11 MG TB24 Take 1 tablet by mouth daily      leflunomide (ARAVA) 20 MG tablet Take 1 tablet by mouth daily. 10 tablet 0    VITAMIN D, CHOLECALCIFEROL, PO Take 4,000 Int'l Units by mouth daily        No current facility-administered medications for this visit. Review of Systems:   Review of Systems   Constitutional: Negative for activity change and appetite change. HENT: Negative for congestion. Respiratory: Negative for apnea, choking and chest tightness. Cardiovascular: Negative for chest pain. Gastrointestinal: Negative for abdominal distention and abdominal pain. Endocrine: Negative for cold intolerance and heat intolerance. Genitourinary: Negative for dysuria and enuresis. Musculoskeletal: Negative for arthralgias and back pain. Skin: Negative for color change. Allergic/Immunologic: Negative. Neurological: Negative for dizziness, seizures, syncope and light-headedness. Psychiatric/Behavioral: Negative for agitation, behavioral problems and confusion. Physical Examination:    /72 (Site: Left Upper Arm, Position: Sitting, Cuff Size: Large Adult)   Pulse 62   Resp 16   Ht 5' 10\" (1.778 m)   Wt 203 lb (92.1 kg)   SpO2 98%   BMI 29.13 kg/m²    Physical Exam   Constitutional: The patient appears healthy. No distress. HENT: Mouth/Throat: Oropharynx is clear. Eyes: Pupils are equal, round, and reactive to light. Neck: Normal range of motion. No JVD present. Cardiovascular: Regular rhythm, S1 normal, S2 normal, normal heart sounds and intact distal pulses. Exam reveals no gallop. No murmur heard. Pulses:       Radial pulses are 2+ on the right side.         Dorsalis pedis pulses are 2+ on the right side.   Pulmonary/Chest: Effort normal and breath sounds normal. No wheezes. No rales. No tenderness. Abdominal: Soft. Bowel sounds are normal.   Musculoskeletal: Normal range of motion. No edema. Neurological: The patient is alert and oriented to person, place, and time. Intact cranial nerves. Skin: Skin is warm and dry. No rash noted.        LABS:  CBC:   Lab Results   Component Value Date    WBC 6.2 04/03/2019    RBC 3.39 04/03/2019    RBC 4.38 04/26/2012    HGB 9.0 04/04/2019    HCT 28.6 04/04/2019    MCV 76.7 04/03/2019    MCH 24.1 04/03/2019    MCHC 31.4 04/03/2019    RDW 25.5 04/03/2019     04/03/2019    MPV 8.3 04/26/2012     Lipids:  Lab Results   Component Value Date    CHOL 244 (H) 06/20/2013     Lab Results   Component Value Date    TRIG 86 06/20/2013     Lab Results   Component Value Date    HDL 59 06/20/2013     Lab Results   Component Value Date    LDLCALC 168 (H) 06/20/2013     No results found for: LABVLDL, VLDL  No results found for: CHOLHDLRATIO  CMP:    Lab Results   Component Value Date     04/03/2019    K 4.3 04/03/2019     04/03/2019    CO2 22 04/03/2019    BUN 11 04/03/2019    CREATININE 0.82 04/03/2019    GFRAA >60.0 04/03/2019    LABGLOM >60.0 04/03/2019    GLUCOSE 83 04/03/2019    GLUCOSE 95 04/26/2012    PROT 6.0 04/03/2019    LABALBU 3.8 04/03/2019    LABALBU 4.5 04/26/2012    CALCIUM 8.5 04/03/2019    BILITOT 0.9 04/03/2019    ALKPHOS 35 04/03/2019    AST 28 04/03/2019    ALT 25 04/03/2019     BMP:    Lab Results   Component Value Date     04/03/2019    K 4.3 04/03/2019     04/03/2019    CO2 22 04/03/2019    BUN 11 04/03/2019    LABALBU 3.8 04/03/2019    LABALBU 4.5 04/26/2012    CREATININE 0.82 04/03/2019    CALCIUM 8.5 04/03/2019    GFRAA >60.0 04/03/2019    LABGLOM >60.0 04/03/2019    GLUCOSE 83 04/03/2019    GLUCOSE 95 04/26/2012     Magnesium:    Lab Results   Component Value Date    MG 1.8 02/27/2019     TSH:No results found for: TSHFT4, TSH    Patient Active Problem List   Diagnosis    Essential hypertension    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 (HCC)    Seasonal allergic rhinitis    Chronic sinusitis    Acute blood loss anemia    Gastrointestinal hemorrhage    Depression    Lipid disorder    Paroxysmal atrial fibrillation with RVR (HCC)    Post PTCA    Steroid-induced osteoporosis    Vitamin D deficiency    Synovitis    PE (pulmonary thromboembolism) ([facility] [address]    History of non-ST elevation myocardial infarction (NSTEMI)    Long-term use of high-risk medication    Methotrexate, long term, current use    Nicotine dependence, uncomplicated    Ventricular tachycardia (HCC)    GI bleed    Anemia due to acute blood loss       Medications:  Current Outpatient Medications   Medication Sig Dispense Refill    Cyanocobalamin (B-12 PO) Take by mouth      Multiple Vitamins-Minerals (MULTIVITAMIN ADULT PO) Take by mouth      ezetimibe (ZETIA) 10 MG tablet Take 10 mg by mouth daily      metoprolol tartrate (LOPRESSOR) 50 MG tablet Take 1.5 tablets by mouth 2 times daily 60 tablet 0    atorvastatin (LIPITOR) 80 MG tablet Take 80 mg by mouth      methotrexate (RHEUMATREX) 2.5 MG chemo tablet TAKE 10 TABLETS ONCE A WEEK WITH FOOD - NO ALCOHOL AND HOLD IF ON ANTIBIOTICS OR IF ILL      Coenzyme Q10 (COQ-10) 50 MG CAPS Take 50 mg by mouth 2 times daily      clopidogrel (PLAVIX) 75 MG tablet Take 75 mg by mouth daily      esomeprazole Magnesium (NEXIUM) 40 MG PACK Take 40 mg by mouth daily      folic acid (FOLVITE) 1 MG tablet Take 1 mg by mouth daily      lisinopril (PRINIVIL;ZESTRIL) 10 MG tablet Take 10 mg by mouth daily      predniSONE (DELTASONE) 5 MG tablet Take 5 mg by mouth daily      Tofacitinib Citrate 11 MG TB24 Take 1 tablet by mouth daily      leflunomide (ARAVA) 20 MG tablet Take 1 tablet by mouth daily.  10 tablet 0    VITAMIN D, CHOLECALCIFEROL, PO Take 4,000 Int'l Units by mouth daily        No current facility-administered medications for this visit. Assessment/Plan:    1. Essential hypertension      2. Lipid disorder      3. Paroxysmal atrial fibrillation with RVR ([facility] [address]      4. History of non-ST elevation myocardial infarction (NSTEMI)      5. Ventricular tachycardia ([facility] [address]    6. Gastrointestinal hemorrhage associated with peptic ulcer  Hold NOAC until cleared by GI. Continue plavix only for now. Discussed risks of anticoagulation vs. Stent thrombosis or CVA risk. Early followup. Counseling: the patient was counseled regarding diet, exercise, weight control and heart healthy lifestyle. Return in about 1 month (around 5/15/2019) for followup cv disease. Electronically signed by   Nelly Perez.  Kalpana Francis MD [facility] Director of Cardiology Services and Cardiac Catheterization Laboratory  [facility], [place]    on 4/17/2019 at 10:53 AM

## 2019-04-17 NOTE — PROGRESS NOTES
Explained small bowel study to him and his wife. Connected equipment, activated capsule and administered. Patient swallowed it with no problems. Live view showed capsule in stomach. Instructions for the day explained and given to patient.   [employer] Endocapsule:  Lot # [id number]  Exp: 06/25/2020

## 2019-04-18 ENCOUNTER — HOSPITAL ENCOUNTER (OUTPATIENT)
Dept: CARDIAC REHAB | Age: 69
Setting detail: THERAPIES SERIES
Discharge: HOME | End: 2019-04-18
Payer: COMMERCIAL

## 2019-04-18 PROCEDURE — 93798 PHYS/QHP OP CAR RHAB W/ECG: CPT

## 2019-04-22 ENCOUNTER — HOSPITAL ENCOUNTER (OUTPATIENT)
Dept: CARDIAC REHAB | Age: 69
Setting detail: THERAPIES SERIES
Discharge: HOME | End: 2019-04-22
Payer: COMMERCIAL

## 2019-04-22 PROCEDURE — 93798 PHYS/QHP OP CAR RHAB W/ECG: CPT

## 2019-04-24 ENCOUNTER — HOSPITAL ENCOUNTER (OUTPATIENT)
Dept: CARDIAC REHAB | Age: 69
Setting detail: THERAPIES SERIES
Discharge: HOME | End: 2019-04-24
Payer: COMMERCIAL

## 2019-05-01 ENCOUNTER — HOSPITAL ENCOUNTER (OUTPATIENT)
Dept: CARDIAC REHAB | Age: 69
Setting detail: THERAPIES SERIES
Discharge: HOME | End: 2019-05-01
Payer: COMMERCIAL

## 2019-05-02 ENCOUNTER — HOSPITAL ENCOUNTER (OUTPATIENT)
Dept: CARDIAC REHAB | Age: 69
Setting detail: THERAPIES SERIES
Discharge: HOME | End: 2019-05-02
Payer: COMMERCIAL

## 2019-05-02 PROCEDURE — 93798 PHYS/QHP OP CAR RHAB W/ECG: CPT

## 2019-05-06 ENCOUNTER — HOSPITAL ENCOUNTER (OUTPATIENT)
Dept: CARDIAC REHAB | Age: 69
Setting detail: THERAPIES SERIES
Discharge: HOME | End: 2019-05-06
Payer: COMMERCIAL

## 2019-05-08 ENCOUNTER — HOSPITAL ENCOUNTER (OUTPATIENT)
Dept: CARDIAC REHAB | Age: 69
Setting detail: THERAPIES SERIES
Discharge: HOME | End: 2019-05-08
Payer: COMMERCIAL

## 2019-05-09 ENCOUNTER — HOSPITAL ENCOUNTER (OUTPATIENT)
Dept: CARDIAC REHAB | Age: 69
Setting detail: THERAPIES SERIES
Discharge: HOME | End: 2019-05-09
Payer: COMMERCIAL

## 2019-05-09 PROCEDURE — 93798 PHYS/QHP OP CAR RHAB W/ECG: CPT

## 2019-05-13 ENCOUNTER — HOSPITAL ENCOUNTER (OUTPATIENT)
Dept: CARDIAC REHAB | Age: 69
Setting detail: THERAPIES SERIES
Discharge: HOME | End: 2019-05-13
Payer: COMMERCIAL

## 2019-05-13 ENCOUNTER — OFFICE VISIT (OUTPATIENT)
Dept: GASTROENTEROLOGY | Age: 69
End: 2019-05-13
Payer: COMMERCIAL

## 2019-05-13 VITALS
RESPIRATION RATE: 16 BRPM | WEIGHT: 207 LBS | DIASTOLIC BLOOD PRESSURE: 60 MMHG | HEIGHT: 70 IN | BODY MASS INDEX: 29.63 KG/M2 | SYSTOLIC BLOOD PRESSURE: 122 MMHG | HEART RATE: 84 BPM | OXYGEN SATURATION: 96 %

## 2019-05-13 DIAGNOSIS — K92.1 GASTROINTESTINAL HEMORRHAGE WITH MELENA: ICD-10-CM

## 2019-05-13 PROCEDURE — 93798 PHYS/QHP OP CAR RHAB W/ECG: CPT

## 2019-05-13 PROCEDURE — 99212 OFFICE O/P EST SF 10 MIN: CPT | Performed by: SPECIALIST

## 2019-05-13 NOTE — PROGRESS NOTES
Gastroenterology Clinic Follow up Visit    Vale Patel  [id number]  Chief Complaint   Patient presents with    Follow-up       HPI and A/P at last visit summarized below:  Pt is here for follow up, had capsule endoscopy and was normal. Most recent EGD and colonscopy was unremarkable, pt is on Plavix, Xaralto d/jes, feels ok, stool is dark because of oral iron supplements. Had CBC done at his PCP office, pt overall feels better,no symptoms  of active gi bleeding. Review of Systems     Past medical history, past surgical history, medication list, social and familyhistory reviewed    Blood pressure 122/60, pulse 84, resp. rate 16, height 5' 10\" (1.778 m), weight 207 lb (93.9 kg), SpO2 96 %. Physical Exam    Laboratory, Pathology, Radiology reviewed in detail with relevantimportant investigations summarized below:    No results for input(s): WBC, HGB, HCT, MCV, PLT in the last 720 hours. Lab Results   Component Value Date    ALT 25 04/03/2019    AST 28 04/03/2019    ALKPHOS 35 04/03/2019    BILITOT 0.9 (H) 04/03/2019     No results found. Endoscopic investigations:     Assessment and Plan:  Vale Patel 79 y.o. male  With h/o gi bleeding and anemia, which seems to have stabilised since pt came off one of the blood thinner and he is supposed to follow up with his cardioligst.    Diagnosis Orders   1. Gastrointestinal hemorrhage with melena         Return if symptoms worsen or fail to improve. Roma Gresham MD   StaffGastroenterologist  [facility]    Please note this report has been partially produced using speech recognitionsoftware  and may cause contain errors related to that system including grammar, punctuation and spelling as well as words andphrases that may seem inappropriate. If there are questions or concerns please feel free to contact me to clarify.

## 2019-05-16 ENCOUNTER — HOSPITAL ENCOUNTER (OUTPATIENT)
Dept: CARDIAC REHAB | Age: 69
Setting detail: THERAPIES SERIES
Discharge: HOME | End: 2019-05-16
Payer: COMMERCIAL

## 2019-05-16 PROCEDURE — 93798 PHYS/QHP OP CAR RHAB W/ECG: CPT

## 2019-05-20 ENCOUNTER — HOSPITAL ENCOUNTER (OUTPATIENT)
Dept: CARDIAC REHAB | Age: 69
Setting detail: THERAPIES SERIES
Discharge: HOME | End: 2019-05-20
Payer: COMMERCIAL

## 2019-05-20 PROCEDURE — 93798 PHYS/QHP OP CAR RHAB W/ECG: CPT

## 2019-05-22 ENCOUNTER — HOSPITAL ENCOUNTER (OUTPATIENT)
Dept: CARDIAC REHAB | Age: 69
Setting detail: THERAPIES SERIES
Discharge: HOME | End: 2019-05-22
Payer: COMMERCIAL

## 2019-05-22 ENCOUNTER — CLINICAL DOCUMENTATION (OUTPATIENT)
Dept: CARDIOLOGY CLINIC | Age: 69
End: 2019-05-22

## 2019-05-22 ENCOUNTER — OFFICE VISIT (OUTPATIENT)
Dept: CARDIOLOGY CLINIC | Age: 69
End: 2019-05-22
Payer: COMMERCIAL

## 2019-05-22 VITALS
OXYGEN SATURATION: 98 % | SYSTOLIC BLOOD PRESSURE: 136 MMHG | HEIGHT: 70 IN | DIASTOLIC BLOOD PRESSURE: 70 MMHG | WEIGHT: 208 LBS | HEART RATE: 58 BPM | RESPIRATION RATE: 16 BRPM | BODY MASS INDEX: 29.78 KG/M2

## 2019-05-22 DIAGNOSIS — I25.2 HISTORY OF NON-ST ELEVATION MYOCARDIAL INFARCTION (NSTEMI): ICD-10-CM

## 2019-05-22 DIAGNOSIS — I10 ESSENTIAL HYPERTENSION: Primary | ICD-10-CM

## 2019-05-22 DIAGNOSIS — I47.20 VENTRICULAR TACHYCARDIA: ICD-10-CM

## 2019-05-22 DIAGNOSIS — I48.0 PAROXYSMAL ATRIAL FIBRILLATION WITH RVR (HCC): ICD-10-CM

## 2019-05-22 PROCEDURE — 99214 OFFICE O/P EST MOD 30 MIN: CPT | Performed by: INTERNAL MEDICINE

## 2019-05-22 RX ORDER — METOPROLOL TARTRATE 50 MG/1
75 TABLET, FILM COATED ORAL 2 TIMES DAILY
Qty: 90 TABLET | Refills: 2 | Status: SHIPPED | OUTPATIENT
Start: 2019-05-22 | End: 2020-01-01 | Stop reason: SDUPTHER

## 2019-05-22 NOTE — PROGRESS NOTES
Dr. Maya Phoenix office called. The patient is being referred for EP studies. The office was called and all pertinent information faxed. The patient was instructed to call the office to schedule the appoiement.

## 2019-05-22 NOTE — PROGRESS NOTES
None   Social Needs    Financial resource strain: None    Food insecurity:     Worry: None     Inability: None    Transportation needs:     Medical: None     Non-medical: None   Tobacco Use    Smoking status: Former Smoker     Packs/day: 1.00     Years: 45.00     Pack years: 45.00     Types: Cigarettes     Last attempt to quit: 2017     Years since quittin.5    Smokeless tobacco: Never Used   Substance and Sexual Activity    Alcohol use: No    Drug use: No    Sexual activity: Not Currently   Lifestyle    Physical activity:     Days per week: None     Minutes per session: None    Stress: None   Relationships    Social connections:     Talks on phone: None     Gets together: None     Attends [employer] service: None     Active member of club or organization: None     Attends meetings of clubs or organizations: None     Relationship status: None    Intimate partner violence:     Fear of current or ex partner: None     Emotionally abused: None     Physically abused: None     Forced sexual activity: None   Other Topics Concern    None   Social History Narrative    LIVES W WIFE       Allergies   Allergen Reactions    Antihistamine [Diphenhydramine Hcl] Other (See Comments)     jittery       Current Outpatient Medications   Medication Sig Dispense Refill    metoprolol tartrate (LOPRESSOR) 50 MG tablet Take 1.5 tablets by mouth 2 times daily 90 tablet 2    apixaban (ELIQUIS) 5 MG TABS tablet Take 1 tablet by mouth 2 times daily 60 tablet 3    Cyanocobalamin (B-12 PO) Take by mouth      Multiple Vitamins-Minerals (MULTIVITAMIN ADULT PO) Take by mouth      ezetimibe (ZETIA) 10 MG tablet Take 10 mg by mouth daily      atorvastatin (LIPITOR) 80 MG tablet Take 80 mg by mouth      methotrexate (RHEUMATREX) 2.5 MG chemo tablet TAKE 10 TABLETS ONCE A WEEK WITH FOOD - NO ALCOHOL AND HOLD IF ON ANTIBIOTICS OR IF ILL      Coenzyme Q10 (COQ-10) 50 MG CAPS Take 50 mg by mouth 2 times daily      clopidogrel (PLAVIX) 75 MG tablet Take 75 mg by mouth daily      esomeprazole Magnesium (NEXIUM) 40 MG PACK Take 40 mg by mouth daily      folic acid (FOLVITE) 1 MG tablet Take 1 mg by mouth daily      lisinopril (PRINIVIL;ZESTRIL) 10 MG tablet Take 10 mg by mouth daily      predniSONE (DELTASONE) 5 MG tablet Take 5 mg by mouth daily      Tofacitinib Citrate 11 MG TB24 Take 1 tablet by mouth daily      leflunomide (ARAVA) 20 MG tablet Take 1 tablet by mouth daily. 10 tablet 0    VITAMIN D, CHOLECALCIFEROL, PO Take 4,000 Int'l Units by mouth daily        No current facility-administered medications for this visit. Review of Systems:   Review of Systems   Constitutional: Negative for activity change and appetite change. HENT: Negative for congestion. Respiratory: Negative for apnea, choking and chest tightness. Cardiovascular: Negative for chest pain. Gastrointestinal: Negative for abdominal distention and abdominal pain. Endocrine: Negative for cold intolerance and heat intolerance. Genitourinary: Negative for dysuria and enuresis. Musculoskeletal: Negative for arthralgias and back pain. Skin: Negative for color change. Allergic/Immunologic: Negative. Neurological: Negative for dizziness, seizures, syncope and light-headedness. Psychiatric/Behavioral: Negative for agitation, behavioral problems and confusion. Physical Examination:    /70 (Site: Right Upper Arm, Position: Sitting, Cuff Size: Large Adult)   Pulse 58   Resp 16   Ht 5' 10\" (1.778 m)   Wt 208 lb (94.3 kg)   SpO2 98%   BMI 29.84 kg/m²    Physical Exam   Constitutional: The patient appears healthy. No distress. HENT: Mouth/Throat: Oropharynx is clear. Eyes: Pupils are equal, round, and reactive to light. Neck: Normal range of motion. No JVD present. Cardiovascular: Regular rhythm, S1 normal, S2 normal, normal heart sounds and intact distal pulses. Exam reveals no gallop.     No murmur heard.  Pulses:       Radial pulses are 2+ on the right side. Dorsalis pedis pulses are 2+ on the right side. Pulmonary/Chest: Effort normal and breath sounds normal. No wheezes. No rales. No tenderness. Abdominal: Soft. Bowel sounds are normal.   Musculoskeletal: Normal range of motion. No edema. Neurological: The patient is alert and oriented to person, place, and time. Intact cranial nerves. Skin: Skin is warm and dry. No rash noted.        LABS:  CBC:   Lab Results   Component Value Date    WBC 6.2 04/03/2019    RBC 3.39 04/03/2019    RBC 4.38 04/26/2012    HGB 9.0 04/04/2019    HCT 28.6 04/04/2019    MCV 76.7 04/03/2019    MCH 24.1 04/03/2019    MCHC 31.4 04/03/2019    RDW 25.5 04/03/2019     04/03/2019    MPV 8.3 04/26/2012     Lipids:  Lab Results   Component Value Date    CHOL 244 (H) 06/20/2013     Lab Results   Component Value Date    TRIG 86 06/20/2013     Lab Results   Component Value Date    HDL 59 06/20/2013     Lab Results   Component Value Date    LDLCALC 168 (H) 06/20/2013     No results found for: LABVLDL, VLDL  No results found for: CHOLHDLRATIO  CMP:    Lab Results   Component Value Date     04/03/2019    K 4.3 04/03/2019     04/03/2019    CO2 22 04/03/2019    BUN 11 04/03/2019    CREATININE 0.82 04/03/2019    GFRAA >60.0 04/03/2019    LABGLOM >60.0 04/03/2019    GLUCOSE 83 04/03/2019    GLUCOSE 95 04/26/2012    PROT 6.0 04/03/2019    LABALBU 3.8 04/03/2019    LABALBU 4.5 04/26/2012    CALCIUM 8.5 04/03/2019    BILITOT 0.9 04/03/2019    ALKPHOS 35 04/03/2019    AST 28 04/03/2019    ALT 25 04/03/2019     BMP:    Lab Results   Component Value Date     04/03/2019    K 4.3 04/03/2019     04/03/2019    CO2 22 04/03/2019    BUN 11 04/03/2019    LABALBU 3.8 04/03/2019    LABALBU 4.5 04/26/2012    CREATININE 0.82 04/03/2019    CALCIUM 8.5 04/03/2019    GFRAA >60.0 04/03/2019    LABGLOM >60.0 04/03/2019    GLUCOSE 83 04/03/2019    GLUCOSE 95 04/26/2012 Magnesium:    Lab Results   Component Value Date    MG 1.8 02/27/2019     TSH:No results found for: TSHFT4, TSH    Patient Active Problem List   Diagnosis    Essential hypertension    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 (HCC)    Seasonal allergic rhinitis    Chronic sinusitis    Acute blood loss anemia    Gastrointestinal hemorrhage    Depression    Lipid disorder    Paroxysmal atrial fibrillation with RVR (HCC)    Post PTCA    Steroid-induced osteoporosis    Vitamin D deficiency    Synovitis    PE (pulmonary thromboembolism) (HCC)    History of non-ST elevation myocardial infarction (NSTEMI)    Long-term use of high-risk medication    Methotrexate, long term, current use    Nicotine dependence, uncomplicated    Ventricular tachycardia (HCC)    GI bleed    Anemia due to acute blood loss    Gastrointestinal hemorrhage with melena       Medications:  Current Outpatient Medications   Medication Sig Dispense Refill    metoprolol tartrate (LOPRESSOR) 50 MG tablet Take 1.5 tablets by mouth 2 times daily 90 tablet 2    apixaban (ELIQUIS) 5 MG TABS tablet Take 1 tablet by mouth 2 times daily 60 tablet 3    Cyanocobalamin (B-12 PO) Take by mouth      Multiple Vitamins-Minerals (MULTIVITAMIN ADULT PO) Take by mouth      ezetimibe (ZETIA) 10 MG tablet Take 10 mg by mouth daily      atorvastatin (LIPITOR) 80 MG tablet Take 80 mg by mouth      methotrexate (RHEUMATREX) 2.5 MG chemo tablet TAKE 10 TABLETS ONCE A WEEK WITH FOOD - NO ALCOHOL AND HOLD IF ON ANTIBIOTICS OR IF ILL      Coenzyme Q10 (COQ-10) 50 MG CAPS Take 50 mg by mouth 2 times daily      clopidogrel (PLAVIX) 75 MG tablet Take 75 mg by mouth daily      esomeprazole Magnesium (NEXIUM) 40 MG PACK Take 40 mg by mouth daily      folic acid (FOLVITE) 1 MG tablet Take 1 mg by mouth daily      lisinopril (PRINIVIL;ZESTRIL) 10 MG tablet Take 10 mg by mouth daily      predniSONE (DELTASONE) 5 MG tablet Take 5 mg by mouth daily      Tofacitinib Citrate 11 MG TB24 Take 1 tablet by mouth daily      leflunomide (ARAVA) 20 MG tablet Take 1 tablet by mouth daily. 10 tablet 0    VITAMIN D, CHOLECALCIFEROL, PO Take 4,000 Int'l Units by mouth daily        No current facility-administered medications for this visit. Assessment/Plan:    1. Essential hypertension  Patient has essential hypertension on BP medications. The guideline-directed target for BP in this patient is <130/80. In order to reach our target BP we will continue current BP medications, increasing the dose of current meds or adding new to reach target. 2. Paroxysmal atrial fibrillation with RVR (HCC)  Restarting eliquis after capsule endoscopy was negative. Consider watchman device. Refer to Dr. Alex Knapp for consideration of afib ablation and/or watchman  - apixaban (ELIQUIS) 5 MG TABS tablet; Take 1 tablet by mouth 2 times daily  Dispense: 60 tablet; Refill: 3  - External Referral to Cardiac Electrophysiology    3. History of non-ST elevation myocardial infarction (NSTEMI)  Coronary artery disease: This patient has known CAD and remote PCI. Currently the angina class is I. The patient will be treated with guideline-directed therapy including aspirin, statin, b-blocker. The patient currently does need a thienopyridine. The patient's risk factors will be optimally managed. The patient is given appropriate counseling given to avoid tobacco.  The patient will continue on current medications. 4. Ventricular tachycardia ([address]  Due to multivessel CAD, resolved. Counseling: the patient was counseled regarding diet, exercise, weight control and heart healthy lifestyle. Return in about 3 months (around 8/22/2019) for followup cv disease. Electronically signed by   Mark Farr MD [facility] Director of Cardiology Services and Cardiac Catheterization Laboratory  [facility]    on 5/22/2019 at 8:40 AM

## 2019-05-23 ENCOUNTER — HOSPITAL ENCOUNTER (OUTPATIENT)
Dept: CARDIAC REHAB | Age: 69
Setting detail: THERAPIES SERIES
Discharge: HOME | End: 2019-05-23
Payer: COMMERCIAL

## 2019-05-23 PROCEDURE — 93798 PHYS/QHP OP CAR RHAB W/ECG: CPT

## 2019-05-29 ENCOUNTER — HOSPITAL ENCOUNTER (OUTPATIENT)
Dept: CARDIAC REHAB | Age: 69
Setting detail: THERAPIES SERIES
Discharge: HOME | End: 2019-05-29
Payer: COMMERCIAL

## 2019-05-30 ENCOUNTER — HOSPITAL ENCOUNTER (OUTPATIENT)
Dept: CARDIAC REHAB | Age: 69
Setting detail: THERAPIES SERIES
Discharge: HOME | End: 2019-05-30
Payer: COMMERCIAL

## 2019-05-30 PROCEDURE — 93798 PHYS/QHP OP CAR RHAB W/ECG: CPT

## 2019-06-03 ENCOUNTER — HOSPITAL ENCOUNTER (OUTPATIENT)
Dept: CARDIAC REHAB | Age: 69
Setting detail: THERAPIES SERIES
Discharge: HOME | End: 2019-06-03
Payer: COMMERCIAL

## 2019-06-03 PROCEDURE — 93798 PHYS/QHP OP CAR RHAB W/ECG: CPT

## 2019-06-05 ENCOUNTER — HOSPITAL ENCOUNTER (OUTPATIENT)
Dept: CARDIAC REHAB | Age: 69
Setting detail: THERAPIES SERIES
Discharge: HOME | End: 2019-06-05
Payer: COMMERCIAL

## 2019-08-29 ENCOUNTER — HOSPITAL ENCOUNTER (INPATIENT)
Age: 69
LOS: 5 days | Discharge: HOME HEALTH | DRG: 866 | End: 2019-09-03
Attending: EMERGENCY MEDICINE | Admitting: INTERNAL MEDICINE
Payer: COMMERCIAL

## 2019-08-29 DIAGNOSIS — Z78.9 FAILURE OF OUTPATIENT TREATMENT: ICD-10-CM

## 2019-08-29 DIAGNOSIS — B02.8 HERPES ZOSTER WITH COMPLICATION: Primary | ICD-10-CM

## 2019-08-29 LAB
ALBUMIN SERPL-MCNC: 3.5 G/DL (ref 3.5–4.6)
ALP BLD-CCNC: 52 U/L (ref 35–104)
ALT SERPL-CCNC: 34 U/L (ref 0–41)
ANION GAP SERPL CALCULATED.3IONS-SCNC: 15 MEQ/L (ref 9–15)
ANISOCYTOSIS: ABNORMAL
AST SERPL-CCNC: 26 U/L (ref 0–40)
BASOPHILS ABSOLUTE: 0.1 K/UL (ref 0–0.2)
BASOPHILS RELATIVE PERCENT: 1.5 %
BILIRUB SERPL-MCNC: 0.4 MG/DL (ref 0.2–0.7)
BUN BLDV-MCNC: 23 MG/DL (ref 8–23)
C-REACTIVE PROTEIN: 63.7 MG/L (ref 0–5)
CALCIUM SERPL-MCNC: 9.6 MG/DL (ref 8.5–9.9)
CHLORIDE BLD-SCNC: 98 MEQ/L (ref 95–107)
CO2: 21 MEQ/L (ref 20–31)
CREAT SERPL-MCNC: 0.76 MG/DL (ref 0.7–1.2)
EOSINOPHILS ABSOLUTE: 0.1 K/UL (ref 0–0.7)
EOSINOPHILS RELATIVE PERCENT: 1.2 %
GFR AFRICAN AMERICAN: >60
GFR NON-AFRICAN AMERICAN: >60
GLOBULIN: 3.6 G/DL (ref 2.3–3.5)
GLUCOSE BLD-MCNC: 108 MG/DL (ref 70–99)
HCT VFR BLD CALC: 36.6 % (ref 42–52)
HEMOGLOBIN: 12.1 G/DL (ref 14–18)
LYMPHOCYTES ABSOLUTE: 0.5 K/UL (ref 1–4.8)
LYMPHOCYTES RELATIVE PERCENT: 8.6 %
MCH RBC QN AUTO: 29.8 PG (ref 27–31.3)
MCHC RBC AUTO-ENTMCNC: 33 % (ref 33–37)
MCV RBC AUTO: 90.4 FL (ref 80–100)
MONOCYTES ABSOLUTE: 0.8 K/UL (ref 0.2–0.8)
MONOCYTES RELATIVE PERCENT: 12.1 %
NEUTROPHILS ABSOLUTE: 4.8 K/UL (ref 1.4–6.5)
NEUTROPHILS RELATIVE PERCENT: 76.6 %
PDW BLD-RTO: 23.1 % (ref 11.5–14.5)
PLATELET # BLD: 272 K/UL (ref 130–400)
POTASSIUM SERPL-SCNC: 4.5 MEQ/L (ref 3.4–4.9)
RBC # BLD: 4.05 M/UL (ref 4.7–6.1)
SEDIMENTATION RATE, ERYTHROCYTE: 76 MM (ref 0–20)
SLIDE REVIEW: ABNORMAL
SODIUM BLD-SCNC: 134 MEQ/L (ref 135–144)
TOTAL PROTEIN: 7.1 G/DL (ref 6.3–8)
WBC # BLD: 6.3 K/UL (ref 4.8–10.8)

## 2019-08-29 PROCEDURE — 1210000000 HC MED SURG R&B

## 2019-08-29 PROCEDURE — 99285 EMERGENCY DEPT VISIT HI MDM: CPT

## 2019-08-29 PROCEDURE — 2580000003 HC RX 258: Performed by: PHYSICIAN ASSISTANT

## 2019-08-29 PROCEDURE — 85652 RBC SED RATE AUTOMATED: CPT

## 2019-08-29 PROCEDURE — 36415 COLL VENOUS BLD VENIPUNCTURE: CPT

## 2019-08-29 PROCEDURE — 6360000002 HC RX W HCPCS: Performed by: PHYSICIAN ASSISTANT

## 2019-08-29 PROCEDURE — 6360000002 HC RX W HCPCS: Performed by: NURSE PRACTITIONER

## 2019-08-29 PROCEDURE — 2580000003 HC RX 258: Performed by: INTERNAL MEDICINE

## 2019-08-29 PROCEDURE — 6360000002 HC RX W HCPCS: Performed by: INTERNAL MEDICINE

## 2019-08-29 PROCEDURE — 80053 COMPREHEN METABOLIC PANEL: CPT

## 2019-08-29 PROCEDURE — 85025 COMPLETE CBC W/AUTO DIFF WBC: CPT

## 2019-08-29 PROCEDURE — 87040 BLOOD CULTURE FOR BACTERIA: CPT

## 2019-08-29 PROCEDURE — 6370000000 HC RX 637 (ALT 250 FOR IP): Performed by: NURSE PRACTITIONER

## 2019-08-29 PROCEDURE — 6370000000 HC RX 637 (ALT 250 FOR IP): Performed by: INTERNAL MEDICINE

## 2019-08-29 PROCEDURE — 2580000003 HC RX 258: Performed by: NURSE PRACTITIONER

## 2019-08-29 PROCEDURE — 86140 C-REACTIVE PROTEIN: CPT

## 2019-08-29 RX ORDER — EZETIMIBE 10 MG/1
10 TABLET ORAL DAILY
Status: DISCONTINUED | OUTPATIENT
Start: 2019-08-29 | End: 2019-08-29 | Stop reason: CLARIF

## 2019-08-29 RX ORDER — ATORVASTATIN CALCIUM 80 MG/1
80 TABLET, FILM COATED ORAL DAILY
COMMUNITY
Start: 2019-08-14 | End: 2020-01-01

## 2019-08-29 RX ORDER — SODIUM CHLORIDE 0.9 % (FLUSH) 0.9 %
10 SYRINGE (ML) INJECTION PRN
Status: DISCONTINUED | OUTPATIENT
Start: 2019-08-29 | End: 2019-09-03 | Stop reason: HOSPADM

## 2019-08-29 RX ORDER — ESOMEPRAZOLE MAGNESIUM 40 MG/1
40 CAPSULE, DELAYED RELEASE ORAL DAILY
Status: DISCONTINUED | OUTPATIENT
Start: 2019-08-29 | End: 2019-08-29 | Stop reason: CLARIF

## 2019-08-29 RX ORDER — ACETAMINOPHEN 325 MG/1
650 TABLET ORAL EVERY 4 HOURS PRN
Status: DISCONTINUED | OUTPATIENT
Start: 2019-08-29 | End: 2019-09-03 | Stop reason: HOSPADM

## 2019-08-29 RX ORDER — PANTOPRAZOLE SODIUM 40 MG/1
40 TABLET, DELAYED RELEASE ORAL
Status: DISCONTINUED | OUTPATIENT
Start: 2019-08-30 | End: 2019-09-03 | Stop reason: HOSPADM

## 2019-08-29 RX ORDER — METHYLPREDNISOLONE SODIUM SUCCINATE 125 MG/2ML
60 INJECTION, POWDER, LYOPHILIZED, FOR SOLUTION INTRAMUSCULAR; INTRAVENOUS EVERY 6 HOURS
Status: DISCONTINUED | OUTPATIENT
Start: 2019-08-29 | End: 2019-08-31

## 2019-08-29 RX ORDER — LISINOPRIL 10 MG/1
10 TABLET ORAL DAILY
Status: DISCONTINUED | OUTPATIENT
Start: 2019-08-29 | End: 2019-09-03 | Stop reason: HOSPADM

## 2019-08-29 RX ORDER — ONDANSETRON 2 MG/ML
4 INJECTION INTRAMUSCULAR; INTRAVENOUS EVERY 6 HOURS PRN
Status: DISCONTINUED | OUTPATIENT
Start: 2019-08-29 | End: 2019-09-03 | Stop reason: HOSPADM

## 2019-08-29 RX ORDER — KETOROLAC TROMETHAMINE 15 MG/ML
15 INJECTION, SOLUTION INTRAMUSCULAR; INTRAVENOUS ONCE
Status: COMPLETED | OUTPATIENT
Start: 2019-08-29 | End: 2019-08-29

## 2019-08-29 RX ORDER — ATORVASTATIN CALCIUM 80 MG/1
80 TABLET, FILM COATED ORAL DAILY
Status: DISCONTINUED | OUTPATIENT
Start: 2019-08-29 | End: 2019-09-03 | Stop reason: HOSPADM

## 2019-08-29 RX ORDER — ONDANSETRON 2 MG/ML
4 INJECTION INTRAMUSCULAR; INTRAVENOUS ONCE
Status: COMPLETED | OUTPATIENT
Start: 2019-08-29 | End: 2019-08-29

## 2019-08-29 RX ORDER — 0.9 % SODIUM CHLORIDE 0.9 %
1000 INTRAVENOUS SOLUTION INTRAVENOUS ONCE
Status: COMPLETED | OUTPATIENT
Start: 2019-08-29 | End: 2019-08-29

## 2019-08-29 RX ORDER — CALCIUM CARBONATE 200(500)MG
500 TABLET,CHEWABLE ORAL 3 TIMES DAILY PRN
Status: DISCONTINUED | OUTPATIENT
Start: 2019-08-29 | End: 2019-09-03 | Stop reason: HOSPADM

## 2019-08-29 RX ORDER — MORPHINE SULFATE 2 MG/ML
2 INJECTION, SOLUTION INTRAMUSCULAR; INTRAVENOUS EVERY 4 HOURS PRN
Status: DISCONTINUED | OUTPATIENT
Start: 2019-08-29 | End: 2019-08-29

## 2019-08-29 RX ORDER — CLOPIDOGREL BISULFATE 75 MG/1
75 TABLET ORAL DAILY
Status: DISCONTINUED | OUTPATIENT
Start: 2019-08-29 | End: 2019-09-03 | Stop reason: HOSPADM

## 2019-08-29 RX ORDER — MORPHINE SULFATE 2 MG/ML
4 INJECTION, SOLUTION INTRAMUSCULAR; INTRAVENOUS ONCE
Status: COMPLETED | OUTPATIENT
Start: 2019-08-29 | End: 2019-08-29

## 2019-08-29 RX ORDER — MORPHINE SULFATE 2 MG/ML
2 INJECTION, SOLUTION INTRAMUSCULAR; INTRAVENOUS EVERY 4 HOURS PRN
Status: DISCONTINUED | OUTPATIENT
Start: 2019-08-29 | End: 2019-09-03 | Stop reason: HOSPADM

## 2019-08-29 RX ORDER — GABAPENTIN 100 MG/1
100 CAPSULE ORAL 3 TIMES DAILY
Status: DISCONTINUED | OUTPATIENT
Start: 2019-08-29 | End: 2019-09-01

## 2019-08-29 RX ORDER — SODIUM CHLORIDE 0.9 % (FLUSH) 0.9 %
10 SYRINGE (ML) INJECTION EVERY 12 HOURS SCHEDULED
Status: DISCONTINUED | OUTPATIENT
Start: 2019-08-29 | End: 2019-09-03 | Stop reason: HOSPADM

## 2019-08-29 RX ADMIN — SODIUM CHLORIDE 1000 ML: 9 INJECTION, SOLUTION INTRAVENOUS at 14:57

## 2019-08-29 RX ADMIN — ONDANSETRON 4 MG: 2 INJECTION INTRAMUSCULAR; INTRAVENOUS at 14:59

## 2019-08-29 RX ADMIN — ACYCLOVIR SODIUM 910 MG: 50 INJECTION, SOLUTION INTRAVENOUS at 15:21

## 2019-08-29 RX ADMIN — ATORVASTATIN CALCIUM 80 MG: 80 TABLET, FILM COATED ORAL at 22:08

## 2019-08-29 RX ADMIN — ENOXAPARIN SODIUM 40 MG: 40 INJECTION SUBCUTANEOUS at 20:25

## 2019-08-29 RX ADMIN — KETOROLAC TROMETHAMINE 15 MG: 15 INJECTION, SOLUTION INTRAMUSCULAR; INTRAVENOUS at 14:58

## 2019-08-29 RX ADMIN — Medication 10 ML: at 20:25

## 2019-08-29 RX ADMIN — ACYCLOVIR SODIUM 910 MG: 50 INJECTION, SOLUTION INTRAVENOUS at 20:59

## 2019-08-29 RX ADMIN — ANTACID TABLETS 500 MG: 500 TABLET, CHEWABLE ORAL at 22:08

## 2019-08-29 RX ADMIN — METHYLPREDNISOLONE SODIUM SUCCINATE 60 MG: 125 INJECTION, POWDER, FOR SOLUTION INTRAMUSCULAR; INTRAVENOUS at 20:26

## 2019-08-29 RX ADMIN — METOPROLOL TARTRATE 75 MG: 50 TABLET ORAL at 20:59

## 2019-08-29 RX ADMIN — MORPHINE SULFATE 2 MG: 2 INJECTION, SOLUTION INTRAMUSCULAR; INTRAVENOUS at 20:25

## 2019-08-29 RX ADMIN — GABAPENTIN 100 MG: 100 CAPSULE ORAL at 20:24

## 2019-08-29 RX ADMIN — MORPHINE SULFATE 4 MG: 2 INJECTION, SOLUTION INTRAMUSCULAR; INTRAVENOUS at 15:01

## 2019-08-29 ASSESSMENT — PAIN DESCRIPTION - ONSET: ONSET: ON-GOING

## 2019-08-29 ASSESSMENT — ENCOUNTER SYMPTOMS
COLOR CHANGE: 1
EYE PAIN: 0
SHORTNESS OF BREATH: 0
EYES NEGATIVE: 1
APNEA: 0
RESPIRATORY NEGATIVE: 1
COLOR CHANGE: 0
ALLERGIC/IMMUNOLOGIC NEGATIVE: 1
GASTROINTESTINAL NEGATIVE: 1
ABDOMINAL PAIN: 0
TROUBLE SWALLOWING: 0

## 2019-08-29 ASSESSMENT — PAIN SCALES - GENERAL
PAINLEVEL_OUTOF10: 8
PAINLEVEL_OUTOF10: 4
PAINLEVEL_OUTOF10: 9
PAINLEVEL_OUTOF10: 9
PAINLEVEL_OUTOF10: 5
PAINLEVEL_OUTOF10: 9

## 2019-08-29 ASSESSMENT — PAIN DESCRIPTION - LOCATION: LOCATION: ABDOMEN;CHEST

## 2019-08-29 ASSESSMENT — PAIN DESCRIPTION - PAIN TYPE: TYPE: ACUTE PAIN

## 2019-08-29 ASSESSMENT — PAIN DESCRIPTION - PROGRESSION: CLINICAL_PROGRESSION: GRADUALLY WORSENING

## 2019-08-29 ASSESSMENT — PAIN DESCRIPTION - DESCRIPTORS: DESCRIPTORS: ACHING;BURNING

## 2019-08-29 ASSESSMENT — PAIN DESCRIPTION - ORIENTATION: ORIENTATION: LEFT

## 2019-08-29 ASSESSMENT — PAIN DESCRIPTION - FREQUENCY: FREQUENCY: CONTINUOUS

## 2019-08-29 NOTE — ED PROVIDER NOTES
Negative for hematuria. Musculoskeletal: Negative for neck pain and neck stiffness. Skin: Positive for rash. Negative for color change. Allergic/Immunologic: Negative. Neurological: Negative for dizziness and numbness. Hematological: Negative. Psychiatric/Behavioral: Negative. All other systems reviewed and are negative. Except as noted above the remainder of the review of systems was reviewed and negative.        PAST MEDICAL HISTORY     Past Medical History:   Diagnosis Date    Depression 2007    History of non-ST elevation myocardial infarction (NSTEMI) 10/19/2018    Overview:  H POA Recent  Chest pain with sweats A CE+ Loaded with plavix  for cath with Dr Gracy Tran yesterday PCI QIAN to RCA P Continue ASA, Plavix and xarelot for 1 month then DC ASA,continue on  BB and increased lipitor dose    Hyperlipidemia     Hypertension     Osteoarthritis     RA (rheumatoid arthritis) ([address]     [facility] Dr. Anita Mackey arthritis(714.0)          SURGICAL HISTORY       Past Surgical History:   Procedure Laterality Date    COLONOSCOPY N/A 4/4/2019    COLONOSCOPY performed by Ozzie Vizcarra MD at [address]  2004    OPEN REDUCTION LEFT LEG    UPPER GASTROINTESTINAL ENDOSCOPY N/A 11/10/2018    EGD ESOPHAGOGASTRODUODENOSCOPY performed by Ozzie Vizcarra MD at [address] 4/3/2019    EGD ESOPHAGOGASTRODUODENOSCOPY performed by Ozzie Vizcarra MD at [address]       Previous Medications    APIXABAN (ELIQUIS) 5 MG TABS TABLET    Take 1 tablet by mouth 2 times daily    CLOPIDOGREL (PLAVIX) 75 MG TABLET    Take 75 mg by mouth daily    COENZYME Q10 (COQ-10) 50 MG CAPS    Take 50 mg by mouth 2 times daily    CYANOCOBALAMIN (B-12 PO)    Take by mouth    ESOMEPRAZOLE MAGNESIUM (NEXIUM) 40 MG PACK    Take 40 mg by mouth daily    EZETIMIBE (ZETIA) 10 MG TABLET    Take 10 mg by mouth daily    FOLIC ACID (FOLVITE) 1 MG TABLET    Take file     Active member of club or organization: Not on file     Attends meetings of clubs or organizations: Not on file     Relationship status: Not on file    Intimate partner violence:     Fear of current or ex partner: Not on file     Emotionally abused: Not on file     Physically abused: Not on file     Forced sexual activity: Not on file   Other Topics Concern    Not on file   Social History Narrative    LIVES W WIFE       SCREENINGS           PHYSICAL EXAM    (up to 7 forlevel 4, 8 or more for level 5)     ED Triage Vitals [08/29/19 1420]   BP Temp Temp Source Pulse Resp SpO2 Height Weight   117/81 97.9 °F (36.6 °C) Oral 98 20 100 % 5' 10\" (1.778 m) 201 lb (91.2 kg)       Physical Exam   Constitutional: He is oriented to person, place, and time. He appears well-developed and well-nourished. No distress. HENT:   Head: Normocephalic and atraumatic. Mouth/Throat: No oropharyngeal exudate. Eyes: Pupils are equal, round, and reactive to light. Conjunctivae and EOM are normal. No scleral icterus. Neck: Normal range of motion. Neck supple. No tracheal deviation present. Cardiovascular: Normal rate, normal heart sounds and intact distal pulses. Pulmonary/Chest: Effort normal and breath sounds normal. No respiratory distress. Abdominal: Soft. Bowel sounds are normal. He exhibits no distension. Musculoskeletal: Normal range of motion. Neurological: He is alert and oriented to person, place, and time. No cranial nerve deficit. He exhibits normal muscle tone. Skin: Skin is warm and dry. Rash noted. Rash is vesicular. He is not diaphoretic. No erythema. Psychiatric: His behavior is normal. Judgment and thought content normal. His mood appears anxious. Nursing note and vitals reviewed.         DIAGNOSTIC RESULTS     RADIOLOGY:   Non-plain film images such as CT, Ultrasound and MRI are read by the radiologist. Plain radiographic images are visualized and preliminarilyinterpreted by Bartolome Mccabe

## 2019-08-30 ENCOUNTER — APPOINTMENT (OUTPATIENT)
Dept: INTERVENTIONAL RADIOLOGY/VASCULAR | Age: 69
DRG: 866 | End: 2019-08-30
Payer: COMMERCIAL

## 2019-08-30 LAB
ALBUMIN SERPL-MCNC: 3.2 G/DL (ref 3.5–4.6)
ALP BLD-CCNC: 45 U/L (ref 35–104)
ALT SERPL-CCNC: 29 U/L (ref 0–41)
ANION GAP SERPL CALCULATED.3IONS-SCNC: 12 MEQ/L (ref 9–15)
AST SERPL-CCNC: 25 U/L (ref 0–40)
BILIRUB SERPL-MCNC: <0.2 MG/DL (ref 0.2–0.7)
BUN BLDV-MCNC: 17 MG/DL (ref 8–23)
CALCIUM SERPL-MCNC: 8.5 MG/DL (ref 8.5–9.9)
CHLORIDE BLD-SCNC: 102 MEQ/L (ref 95–107)
CO2: 21 MEQ/L (ref 20–31)
CREAT SERPL-MCNC: 0.69 MG/DL (ref 0.7–1.2)
GFR AFRICAN AMERICAN: >60
GFR NON-AFRICAN AMERICAN: >60
GLOBULIN: 3.1 G/DL (ref 2.3–3.5)
GLUCOSE BLD-MCNC: 195 MG/DL (ref 70–99)
POTASSIUM REFLEX MAGNESIUM: 4.6 MEQ/L (ref 3.4–4.9)
SODIUM BLD-SCNC: 135 MEQ/L (ref 135–144)
TOTAL PROTEIN: 6.3 G/DL (ref 6.3–8)

## 2019-08-30 PROCEDURE — 36573 INSJ PICC RS&I 5 YR+: CPT

## 2019-08-30 PROCEDURE — 99222 1ST HOSP IP/OBS MODERATE 55: CPT | Performed by: INTERNAL MEDICINE

## 2019-08-30 PROCEDURE — 2700000000 HC OXYGEN THERAPY PER DAY

## 2019-08-30 PROCEDURE — 2580000003 HC RX 258: Performed by: INTERNAL MEDICINE

## 2019-08-30 PROCEDURE — 2500000003 HC RX 250 WO HCPCS: Performed by: INTERNAL MEDICINE

## 2019-08-30 PROCEDURE — 80053 COMPREHEN METABOLIC PANEL: CPT

## 2019-08-30 PROCEDURE — 6360000002 HC RX W HCPCS: Performed by: INTERNAL MEDICINE

## 2019-08-30 PROCEDURE — 2709999900 IR PICC WO SQ PORT/PUMP > 5 YEARS

## 2019-08-30 PROCEDURE — 6360000002 HC RX W HCPCS: Performed by: NURSE PRACTITIONER

## 2019-08-30 PROCEDURE — 02HV33Z INSERTION OF INFUSION DEVICE INTO SUPERIOR VENA CAVA, PERCUTANEOUS APPROACH: ICD-10-PCS | Performed by: INTERNAL MEDICINE

## 2019-08-30 PROCEDURE — 1210000000 HC MED SURG R&B

## 2019-08-30 PROCEDURE — 6370000000 HC RX 637 (ALT 250 FOR IP): Performed by: NURSE PRACTITIONER

## 2019-08-30 PROCEDURE — 36415 COLL VENOUS BLD VENIPUNCTURE: CPT

## 2019-08-30 PROCEDURE — 6370000000 HC RX 637 (ALT 250 FOR IP): Performed by: INTERNAL MEDICINE

## 2019-08-30 PROCEDURE — 2580000003 HC RX 258: Performed by: NURSE PRACTITIONER

## 2019-08-30 RX ORDER — SODIUM CHLORIDE 0.9 % (FLUSH) 0.9 %
10 SYRINGE (ML) INJECTION PRN
Status: DISCONTINUED | OUTPATIENT
Start: 2019-08-30 | End: 2019-09-03 | Stop reason: HOSPADM

## 2019-08-30 RX ORDER — SODIUM CHLORIDE 0.9 % (FLUSH) 0.9 %
10 SYRINGE (ML) INJECTION EVERY 12 HOURS SCHEDULED
Status: DISCONTINUED | OUTPATIENT
Start: 2019-08-30 | End: 2019-09-03 | Stop reason: HOSPADM

## 2019-08-30 RX ORDER — LIDOCAINE HYDROCHLORIDE 20 MG/ML
5 INJECTION, SOLUTION INFILTRATION; PERINEURAL ONCE
Status: COMPLETED | OUTPATIENT
Start: 2019-08-30 | End: 2019-08-30

## 2019-08-30 RX ORDER — SODIUM CHLORIDE 9 MG/ML
250 INJECTION, SOLUTION INTRAVENOUS ONCE
Status: COMPLETED | OUTPATIENT
Start: 2019-08-30 | End: 2019-08-30

## 2019-08-30 RX ADMIN — MORPHINE SULFATE 2 MG: 2 INJECTION, SOLUTION INTRAMUSCULAR; INTRAVENOUS at 09:09

## 2019-08-30 RX ADMIN — METOPROLOL TARTRATE 75 MG: 50 TABLET ORAL at 20:10

## 2019-08-30 RX ADMIN — LIDOCAINE HYDROCHLORIDE 5 ML: 20 INJECTION, SOLUTION INFILTRATION; PERINEURAL at 17:15

## 2019-08-30 RX ADMIN — METHYLPREDNISOLONE SODIUM SUCCINATE 60 MG: 125 INJECTION, POWDER, FOR SOLUTION INTRAMUSCULAR; INTRAVENOUS at 08:51

## 2019-08-30 RX ADMIN — LISINOPRIL 10 MG: 10 TABLET ORAL at 08:50

## 2019-08-30 RX ADMIN — METHYLPREDNISOLONE SODIUM SUCCINATE 60 MG: 125 INJECTION, POWDER, FOR SOLUTION INTRAMUSCULAR; INTRAVENOUS at 13:36

## 2019-08-30 RX ADMIN — PANTOPRAZOLE SODIUM 40 MG: 40 TABLET, DELAYED RELEASE ORAL at 05:38

## 2019-08-30 RX ADMIN — GABAPENTIN 100 MG: 100 CAPSULE ORAL at 13:35

## 2019-08-30 RX ADMIN — CLOPIDOGREL BISULFATE 75 MG: 75 TABLET ORAL at 08:50

## 2019-08-30 RX ADMIN — MORPHINE SULFATE 2 MG: 2 INJECTION, SOLUTION INTRAMUSCULAR; INTRAVENOUS at 19:20

## 2019-08-30 RX ADMIN — SODIUM CHLORIDE 250 ML: 9 INJECTION, SOLUTION INTRAVENOUS at 17:16

## 2019-08-30 RX ADMIN — METHYLPREDNISOLONE SODIUM SUCCINATE 60 MG: 125 INJECTION, POWDER, FOR SOLUTION INTRAMUSCULAR; INTRAVENOUS at 03:44

## 2019-08-30 RX ADMIN — GABAPENTIN 100 MG: 100 CAPSULE ORAL at 08:50

## 2019-08-30 RX ADMIN — Medication 10 ML: at 20:11

## 2019-08-30 RX ADMIN — ACYCLOVIR SODIUM 910 MG: 50 INJECTION, SOLUTION INTRAVENOUS at 05:38

## 2019-08-30 RX ADMIN — ACYCLOVIR SODIUM 910 MG: 50 INJECTION, SOLUTION INTRAVENOUS at 15:33

## 2019-08-30 RX ADMIN — Medication 10 ML: at 08:53

## 2019-08-30 RX ADMIN — ENOXAPARIN SODIUM 40 MG: 40 INJECTION SUBCUTANEOUS at 20:11

## 2019-08-30 RX ADMIN — METHYLPREDNISOLONE SODIUM SUCCINATE 60 MG: 125 INJECTION, POWDER, FOR SOLUTION INTRAMUSCULAR; INTRAVENOUS at 20:11

## 2019-08-30 RX ADMIN — GABAPENTIN 100 MG: 100 CAPSULE ORAL at 20:10

## 2019-08-30 RX ADMIN — MORPHINE SULFATE 2 MG: 2 INJECTION, SOLUTION INTRAMUSCULAR; INTRAVENOUS at 14:02

## 2019-08-30 RX ADMIN — METOPROLOL TARTRATE 75 MG: 50 TABLET ORAL at 09:08

## 2019-08-30 ASSESSMENT — PAIN SCALES - GENERAL
PAINLEVEL_OUTOF10: 5
PAINLEVEL_OUTOF10: 6
PAINLEVEL_OUTOF10: 4
PAINLEVEL_OUTOF10: 6
PAINLEVEL_OUTOF10: 5
PAINLEVEL_OUTOF10: 0
PAINLEVEL_OUTOF10: 6

## 2019-08-30 ASSESSMENT — PAIN DESCRIPTION - PAIN TYPE: TYPE: ACUTE PAIN

## 2019-08-30 ASSESSMENT — PAIN DESCRIPTION - LOCATION: LOCATION: ABDOMEN;CHEST

## 2019-08-30 ASSESSMENT — PAIN DESCRIPTION - ORIENTATION: ORIENTATION: LEFT

## 2019-08-30 NOTE — CONSULTS
Chronic sinusitis    Acute blood loss anemia    Gastrointestinal hemorrhage    Depression    Lipid disorder    Paroxysmal atrial fibrillation with RVR (HCC)    Post PTCA    Steroid-induced osteoporosis    Vitamin D deficiency    Synovitis    PE (pulmonary thromboembolism) (HCC)    History of non-ST elevation myocardial infarction (NSTEMI)    Long-term use of high-risk medication    Methotrexate, long term, current use    Nicotine dependence, uncomplicated    Ventricular tachycardia (HCC)    GI bleed    Anemia due to acute blood loss    Gastrointestinal hemorrhage with melena    Herpes zoster with complication       PLAN:  · PICC  · HHC  · 3 weeks IV Acyclovir  · Hold off MTX for 2-3 weeks    Discussed with patient and spouse    Leonard Jett MD

## 2019-08-31 PROCEDURE — 6360000002 HC RX W HCPCS: Performed by: NURSE PRACTITIONER

## 2019-08-31 PROCEDURE — 6360000002 HC RX W HCPCS: Performed by: INTERNAL MEDICINE

## 2019-08-31 PROCEDURE — 2700000000 HC OXYGEN THERAPY PER DAY

## 2019-08-31 PROCEDURE — 2580000003 HC RX 258: Performed by: NURSE PRACTITIONER

## 2019-08-31 PROCEDURE — 2580000003 HC RX 258: Performed by: INTERNAL MEDICINE

## 2019-08-31 PROCEDURE — 6370000000 HC RX 637 (ALT 250 FOR IP): Performed by: INTERNAL MEDICINE

## 2019-08-31 PROCEDURE — 1210000000 HC MED SURG R&B

## 2019-08-31 PROCEDURE — 6370000000 HC RX 637 (ALT 250 FOR IP): Performed by: NURSE PRACTITIONER

## 2019-08-31 PROCEDURE — 99232 SBSQ HOSP IP/OBS MODERATE 35: CPT | Performed by: INTERNAL MEDICINE

## 2019-08-31 RX ORDER — GABAPENTIN 100 MG/1
100 CAPSULE ORAL 3 TIMES DAILY
Qty: 90 CAPSULE | Refills: 0 | Status: SHIPPED | OUTPATIENT
Start: 2019-08-31 | End: 2019-09-30

## 2019-08-31 RX ORDER — METHYLPREDNISOLONE SODIUM SUCCINATE 40 MG/ML
40 INJECTION, POWDER, LYOPHILIZED, FOR SOLUTION INTRAMUSCULAR; INTRAVENOUS EVERY 12 HOURS
Status: DISCONTINUED | OUTPATIENT
Start: 2019-08-31 | End: 2019-09-01

## 2019-08-31 RX ORDER — METHYLPREDNISOLONE 4 MG/1
TABLET ORAL
Qty: 1 KIT | Refills: 0 | Status: SHIPPED | OUTPATIENT
Start: 2019-08-31 | End: 2019-09-06

## 2019-08-31 RX ADMIN — ACYCLOVIR SODIUM 910 MG: 50 INJECTION, SOLUTION INTRAVENOUS at 06:24

## 2019-08-31 RX ADMIN — CLOPIDOGREL BISULFATE 75 MG: 75 TABLET ORAL at 08:18

## 2019-08-31 RX ADMIN — METHYLPREDNISOLONE SODIUM SUCCINATE 60 MG: 125 INJECTION, POWDER, FOR SOLUTION INTRAMUSCULAR; INTRAVENOUS at 01:14

## 2019-08-31 RX ADMIN — ATORVASTATIN CALCIUM 80 MG: 80 TABLET, FILM COATED ORAL at 08:18

## 2019-08-31 RX ADMIN — PANTOPRAZOLE SODIUM 40 MG: 40 TABLET, DELAYED RELEASE ORAL at 06:24

## 2019-08-31 RX ADMIN — ENOXAPARIN SODIUM 40 MG: 40 INJECTION SUBCUTANEOUS at 20:41

## 2019-08-31 RX ADMIN — Medication 10 ML: at 08:17

## 2019-08-31 RX ADMIN — GABAPENTIN 100 MG: 100 CAPSULE ORAL at 20:41

## 2019-08-31 RX ADMIN — LISINOPRIL 10 MG: 10 TABLET ORAL at 08:18

## 2019-08-31 RX ADMIN — Medication 10 ML: at 06:25

## 2019-08-31 RX ADMIN — ACYCLOVIR SODIUM 910 MG: 50 INJECTION, SOLUTION INTRAVENOUS at 23:06

## 2019-08-31 RX ADMIN — MORPHINE SULFATE 2 MG: 2 INJECTION, SOLUTION INTRAMUSCULAR; INTRAVENOUS at 06:24

## 2019-08-31 RX ADMIN — MORPHINE SULFATE 2 MG: 2 INJECTION, SOLUTION INTRAMUSCULAR; INTRAVENOUS at 13:04

## 2019-08-31 RX ADMIN — Medication 10 ML: at 08:18

## 2019-08-31 RX ADMIN — ACYCLOVIR SODIUM 910 MG: 50 INJECTION, SOLUTION INTRAVENOUS at 14:00

## 2019-08-31 RX ADMIN — GABAPENTIN 100 MG: 100 CAPSULE ORAL at 14:06

## 2019-08-31 RX ADMIN — Medication 10 ML: at 01:15

## 2019-08-31 RX ADMIN — METOPROLOL TARTRATE 75 MG: 50 TABLET ORAL at 08:18

## 2019-08-31 RX ADMIN — METOPROLOL TARTRATE 75 MG: 50 TABLET ORAL at 20:41

## 2019-08-31 RX ADMIN — GABAPENTIN 100 MG: 100 CAPSULE ORAL at 08:18

## 2019-08-31 RX ADMIN — Medication 10 ML: at 20:42

## 2019-08-31 ASSESSMENT — PAIN SCALES - GENERAL
PAINLEVEL_OUTOF10: 7
PAINLEVEL_OUTOF10: 6
PAINLEVEL_OUTOF10: 3
PAINLEVEL_OUTOF10: 6

## 2019-08-31 NOTE — CARE COORDINATION
IV CHECK SENT, SPOKE WITH [facility] PHARMACIST TO NOTIFY OF DC ORDERED FOR TODAY. [facility], DO NOT CONTRACT WITH [facility], SPOKE WITH DAIJA AT [facility], WILL SEND IV CHECK TO HER AS WELL AS RX. 100% COVERAGE PER DAIJA, RX FAXED TO [facility]. PER DAIJA, INTAKE WILL LOOK FOR HOME CARE TO COVER THIS WEEK. -SPOKE WITH INTAKE AT [facility], THEY STATE THEY HAVE AVAILABILITY AND WILL CHECK INSURANCE, ORDER AND DEMOS SENT. AWAITING CONFIRMATION OF COVERAGE TO START TOMORROW. CALLED [facility], THEY DO NOT CONTRACT WITH PATIENTS INSURANCE.

## 2019-08-31 NOTE — DISCHARGE SUMMARY
(PRINIVIL;ZESTRIL) 10 MG tablet Take 10 mg by mouth daily      predniSONE (DELTASONE) 5 MG tablet Take 5 mg by mouth daily      VITAMIN D, CHOLECALCIFEROL, PO Take 4,000 Int'l Units by mouth daily          STOP taking these medications       Tofacitinib Citrate 11 MG TB24 Comments:   Reason for Stopping:         leflunomide (ARAVA) 20 MG tablet Comments:   Reason for Stopping:             Activity: activity as tolerated  Diet: regular diet  Wound Care: Keep lesions to air     Follow-up with ID in three weeks. Follow up with PCP, Dr. Milagros Fernandes (rheum) in 1-2 weeks.      Signed:  Renee Mean    8/31/2019  10:10 AM

## 2019-08-31 NOTE — CARE COORDINATION
IV BENEFIT REQUEST FORM    [facility]                        [address] [place]    REQUESTED BY: Electronically signed by Koko Harkins RN on 2019 at 10:02 AM                                               RN/C3: PHONE: [phone]    DATE:/TIME OF REQUEST: 19  TIME: 10:02 AM      TO: Lee[address] TO: [phone]    PHONE: [phone]     THIS PATIENT HAS BEEN IDENTIFIED TO POSSIBLY NEED LONG TERM IV'S. PLEASE CHECK INSURANCE COVERAGE FOR THE FOLLOWING PT/DRUGS. PATIENT'S NAME: Garrett Juarez                              [facility]   PATIENT'S : 1950  PATIENT ADDRESS: [place] [id number]  SSN:      PAYOR NAME:  Payor: Aminta Malave / Plan: Aminta Malave / Product Type: *No Product type* /     AIJP:[id number]      DOSE:910MG       FREQUENCY: Q 8 HR      __________ CHECK HERE IF PT HAS NO INSURANCE AND REQUESTING SELF PAY COST. *IF [facility] IS NOT A PROVIDER FOR THIS PATIENT, PLEASE FORWARD INFO VIA FAX TO [facility] @ [phone],(PHONE NUMBER: [phone]) TO RUN BENEFIT VERIFICATION AND NOTIFY THE ABOVE C3 OF THIS PLAN. (FAX FACE SHEET WITH DEMOGRAPHICS AND INSURANCE INFO WITH THIS FORM.)  PLEASE FAX BENEFIT INFO TO: THE [phone]    This message is intended only for the use of the individual or entity to which it is addressed and may contain information that is privileged, confidential, and exempt from disclosure under applicable law. If the reader of the notice is not intended recipient of the employee/agent responsible for delivering the message to the intended recipient, you are hereby notified than any dissemination, distribution or copying of this communication is strictly prohibited. Please contact the sender for further instructions on handling the information.

## 2019-09-01 PROCEDURE — 6370000000 HC RX 637 (ALT 250 FOR IP): Performed by: INTERNAL MEDICINE

## 2019-09-01 PROCEDURE — 2580000003 HC RX 258: Performed by: NURSE PRACTITIONER

## 2019-09-01 PROCEDURE — 6360000002 HC RX W HCPCS: Performed by: INTERNAL MEDICINE

## 2019-09-01 PROCEDURE — 6370000000 HC RX 637 (ALT 250 FOR IP): Performed by: NURSE PRACTITIONER

## 2019-09-01 PROCEDURE — 99232 SBSQ HOSP IP/OBS MODERATE 35: CPT | Performed by: INTERNAL MEDICINE

## 2019-09-01 PROCEDURE — 2580000003 HC RX 258: Performed by: INTERNAL MEDICINE

## 2019-09-01 PROCEDURE — 1210000000 HC MED SURG R&B

## 2019-09-01 RX ORDER — PREDNISONE 20 MG/1
40 TABLET ORAL DAILY
Status: DISCONTINUED | OUTPATIENT
Start: 2019-09-01 | End: 2019-09-03 | Stop reason: HOSPADM

## 2019-09-01 RX ORDER — GABAPENTIN 100 MG/1
200 CAPSULE ORAL 3 TIMES DAILY
Status: DISCONTINUED | OUTPATIENT
Start: 2019-09-01 | End: 2019-09-03 | Stop reason: HOSPADM

## 2019-09-01 RX ADMIN — GABAPENTIN 200 MG: 100 CAPSULE ORAL at 13:18

## 2019-09-01 RX ADMIN — METHYLPREDNISOLONE SODIUM SUCCINATE 40 MG: 40 INJECTION, POWDER, FOR SOLUTION INTRAMUSCULAR; INTRAVENOUS at 07:50

## 2019-09-01 RX ADMIN — METOPROLOL TARTRATE 75 MG: 50 TABLET ORAL at 21:10

## 2019-09-01 RX ADMIN — MORPHINE SULFATE 2 MG: 2 INJECTION, SOLUTION INTRAMUSCULAR; INTRAVENOUS at 07:57

## 2019-09-01 RX ADMIN — ACYCLOVIR SODIUM 910 MG: 50 INJECTION, SOLUTION INTRAVENOUS at 22:06

## 2019-09-01 RX ADMIN — ACYCLOVIR SODIUM 910 MG: 50 INJECTION, SOLUTION INTRAVENOUS at 13:18

## 2019-09-01 RX ADMIN — Medication 10 ML: at 07:51

## 2019-09-01 RX ADMIN — MORPHINE SULFATE 2 MG: 2 INJECTION, SOLUTION INTRAMUSCULAR; INTRAVENOUS at 00:23

## 2019-09-01 RX ADMIN — ATORVASTATIN CALCIUM 80 MG: 80 TABLET, FILM COATED ORAL at 07:50

## 2019-09-01 RX ADMIN — GABAPENTIN 100 MG: 100 CAPSULE ORAL at 07:51

## 2019-09-01 RX ADMIN — Medication 10 ML: at 23:49

## 2019-09-01 RX ADMIN — LISINOPRIL 10 MG: 10 TABLET ORAL at 07:51

## 2019-09-01 RX ADMIN — Medication 10 ML: at 22:08

## 2019-09-01 RX ADMIN — METOPROLOL TARTRATE 75 MG: 50 TABLET ORAL at 07:50

## 2019-09-01 RX ADMIN — PANTOPRAZOLE SODIUM 40 MG: 40 TABLET, DELAYED RELEASE ORAL at 06:09

## 2019-09-01 RX ADMIN — ACYCLOVIR SODIUM 910 MG: 50 INJECTION, SOLUTION INTRAVENOUS at 06:09

## 2019-09-01 RX ADMIN — MORPHINE SULFATE 2 MG: 2 INJECTION, SOLUTION INTRAMUSCULAR; INTRAVENOUS at 18:20

## 2019-09-01 RX ADMIN — CLOPIDOGREL BISULFATE 75 MG: 75 TABLET ORAL at 07:50

## 2019-09-01 RX ADMIN — GABAPENTIN 200 MG: 100 CAPSULE ORAL at 21:10

## 2019-09-01 RX ADMIN — ENOXAPARIN SODIUM 40 MG: 40 INJECTION SUBCUTANEOUS at 21:10

## 2019-09-01 ASSESSMENT — PAIN SCALES - GENERAL
PAINLEVEL_OUTOF10: 4
PAINLEVEL_OUTOF10: 7
PAINLEVEL_OUTOF10: 9
PAINLEVEL_OUTOF10: 6

## 2019-09-01 ASSESSMENT — PAIN DESCRIPTION - ORIENTATION: ORIENTATION: LEFT

## 2019-09-01 ASSESSMENT — PAIN DESCRIPTION - LOCATION: LOCATION: ABDOMEN;BACK

## 2019-09-02 LAB
EKG ATRIAL RATE: 79 BPM
EKG P AXIS: 25 DEGREES
EKG P-R INTERVAL: 136 MS
EKG Q-T INTERVAL: 352 MS
EKG QRS DURATION: 84 MS
EKG QTC CALCULATION (BAZETT): 403 MS
EKG R AXIS: 75 DEGREES
EKG T AXIS: 6 DEGREES
EKG VENTRICULAR RATE: 79 BPM

## 2019-09-02 PROCEDURE — 6370000000 HC RX 637 (ALT 250 FOR IP): Performed by: INTERNAL MEDICINE

## 2019-09-02 PROCEDURE — 2580000003 HC RX 258: Performed by: INTERNAL MEDICINE

## 2019-09-02 PROCEDURE — 99232 SBSQ HOSP IP/OBS MODERATE 35: CPT | Performed by: INTERNAL MEDICINE

## 2019-09-02 PROCEDURE — 1210000000 HC MED SURG R&B

## 2019-09-02 PROCEDURE — 6360000002 HC RX W HCPCS: Performed by: INTERNAL MEDICINE

## 2019-09-02 PROCEDURE — 93005 ELECTROCARDIOGRAM TRACING: CPT | Performed by: INTERNAL MEDICINE

## 2019-09-02 RX ORDER — SODIUM CHLORIDE, SODIUM LACTATE, POTASSIUM CHLORIDE, AND CALCIUM CHLORIDE .6; .31; .03; .02 G/100ML; G/100ML; G/100ML; G/100ML
1000 INJECTION, SOLUTION INTRAVENOUS ONCE
Status: COMPLETED | OUTPATIENT
Start: 2019-09-02 | End: 2019-09-02

## 2019-09-02 RX ORDER — METOPROLOL TARTRATE 50 MG/1
100 TABLET, FILM COATED ORAL 2 TIMES DAILY
Status: DISCONTINUED | OUTPATIENT
Start: 2019-09-02 | End: 2019-09-02

## 2019-09-02 RX ADMIN — PREDNISONE 40 MG: 20 TABLET ORAL at 07:46

## 2019-09-02 RX ADMIN — GABAPENTIN 200 MG: 100 CAPSULE ORAL at 07:46

## 2019-09-02 RX ADMIN — Medication 10 ML: at 22:12

## 2019-09-02 RX ADMIN — PANTOPRAZOLE SODIUM 40 MG: 40 TABLET, DELAYED RELEASE ORAL at 06:32

## 2019-09-02 RX ADMIN — SODIUM CHLORIDE, POTASSIUM CHLORIDE, SODIUM LACTATE AND CALCIUM CHLORIDE 1000 ML: 600; 310; 30; 20 INJECTION, SOLUTION INTRAVENOUS at 11:25

## 2019-09-02 RX ADMIN — GABAPENTIN 200 MG: 100 CAPSULE ORAL at 22:11

## 2019-09-02 RX ADMIN — ENOXAPARIN SODIUM 40 MG: 40 INJECTION SUBCUTANEOUS at 22:11

## 2019-09-02 RX ADMIN — CLOPIDOGREL BISULFATE 75 MG: 75 TABLET ORAL at 07:46

## 2019-09-02 RX ADMIN — ACYCLOVIR SODIUM 910 MG: 50 INJECTION, SOLUTION INTRAVENOUS at 06:32

## 2019-09-02 RX ADMIN — ACYCLOVIR SODIUM 910 MG: 50 INJECTION, SOLUTION INTRAVENOUS at 22:12

## 2019-09-02 RX ADMIN — LISINOPRIL 10 MG: 10 TABLET ORAL at 07:46

## 2019-09-02 RX ADMIN — METOPROLOL TARTRATE 75 MG: 50 TABLET ORAL at 07:46

## 2019-09-02 RX ADMIN — MORPHINE SULFATE 2 MG: 2 INJECTION, SOLUTION INTRAMUSCULAR; INTRAVENOUS at 19:19

## 2019-09-02 RX ADMIN — METOPROLOL TARTRATE 75 MG: 50 TABLET ORAL at 22:11

## 2019-09-02 RX ADMIN — GABAPENTIN 200 MG: 100 CAPSULE ORAL at 13:42

## 2019-09-02 RX ADMIN — MORPHINE SULFATE 2 MG: 2 INJECTION, SOLUTION INTRAMUSCULAR; INTRAVENOUS at 07:46

## 2019-09-02 RX ADMIN — Medication 10 ML: at 07:46

## 2019-09-02 RX ADMIN — ATORVASTATIN CALCIUM 80 MG: 80 TABLET, FILM COATED ORAL at 07:46

## 2019-09-02 RX ADMIN — ACYCLOVIR SODIUM 910 MG: 50 INJECTION, SOLUTION INTRAVENOUS at 13:40

## 2019-09-02 ASSESSMENT — PAIN DESCRIPTION - LOCATION: LOCATION: ABDOMEN;BACK

## 2019-09-02 ASSESSMENT — PAIN DESCRIPTION - PAIN TYPE: TYPE: ACUTE PAIN

## 2019-09-02 ASSESSMENT — PAIN SCALES - GENERAL
PAINLEVEL_OUTOF10: 5
PAINLEVEL_OUTOF10: 5

## 2019-09-02 NOTE — CARE COORDINATION
Per Dr. Ilana Wahl, anticipate discharge Tuesday 9/3 on PO's. Patient has been on 5days of IV acyclovir and she feels that is sufficient. Call placed to [facility] to advise. Electronically signed by Alejandro Patton RN on 9/2/2019 at 2:39 PM    Info faxed to [facility] for review. Anticipated discharge today per notes.   Electronically signed by Alejandro Patton RN on 9/2/2019 at 8:34 AM

## 2019-09-03 VITALS
DIASTOLIC BLOOD PRESSURE: 77 MMHG | TEMPERATURE: 98.4 F | RESPIRATION RATE: 16 BRPM | SYSTOLIC BLOOD PRESSURE: 143 MMHG | HEART RATE: 100 BPM | HEIGHT: 70 IN | BODY MASS INDEX: 28.77 KG/M2 | OXYGEN SATURATION: 94 % | WEIGHT: 201 LBS

## 2019-09-03 LAB
BLOOD CULTURE, ROUTINE: NORMAL
CULTURE, BLOOD 2: NORMAL

## 2019-09-03 PROCEDURE — 2580000003 HC RX 258: Performed by: NURSE PRACTITIONER

## 2019-09-03 PROCEDURE — 6370000000 HC RX 637 (ALT 250 FOR IP): Performed by: INTERNAL MEDICINE

## 2019-09-03 PROCEDURE — 93010 ELECTROCARDIOGRAM REPORT: CPT | Performed by: INTERNAL MEDICINE

## 2019-09-03 PROCEDURE — 6360000002 HC RX W HCPCS: Performed by: INTERNAL MEDICINE

## 2019-09-03 PROCEDURE — 2580000003 HC RX 258: Performed by: INTERNAL MEDICINE

## 2019-09-03 PROCEDURE — 99232 SBSQ HOSP IP/OBS MODERATE 35: CPT | Performed by: INTERNAL MEDICINE

## 2019-09-03 RX ADMIN — GABAPENTIN 200 MG: 100 CAPSULE ORAL at 10:27

## 2019-09-03 RX ADMIN — METOPROLOL TARTRATE 75 MG: 50 TABLET ORAL at 10:25

## 2019-09-03 RX ADMIN — ACYCLOVIR SODIUM 910 MG: 50 INJECTION, SOLUTION INTRAVENOUS at 06:16

## 2019-09-03 RX ADMIN — ACYCLOVIR SODIUM 910 MG: 50 INJECTION, SOLUTION INTRAVENOUS at 15:46

## 2019-09-03 RX ADMIN — ATORVASTATIN CALCIUM 80 MG: 80 TABLET, FILM COATED ORAL at 10:27

## 2019-09-03 RX ADMIN — MORPHINE SULFATE 2 MG: 2 INJECTION, SOLUTION INTRAMUSCULAR; INTRAVENOUS at 06:33

## 2019-09-03 RX ADMIN — Medication 10 ML: at 10:29

## 2019-09-03 RX ADMIN — CLOPIDOGREL BISULFATE 75 MG: 75 TABLET ORAL at 10:27

## 2019-09-03 RX ADMIN — ENOXAPARIN SODIUM 40 MG: 40 INJECTION SUBCUTANEOUS at 10:25

## 2019-09-03 RX ADMIN — GABAPENTIN 200 MG: 100 CAPSULE ORAL at 14:08

## 2019-09-03 RX ADMIN — Medication 10 ML: at 10:28

## 2019-09-03 RX ADMIN — PREDNISONE 40 MG: 20 TABLET ORAL at 10:26

## 2019-09-03 RX ADMIN — LISINOPRIL 10 MG: 10 TABLET ORAL at 10:26

## 2019-09-03 RX ADMIN — PANTOPRAZOLE SODIUM 40 MG: 40 TABLET, DELAYED RELEASE ORAL at 06:16

## 2019-09-03 ASSESSMENT — PAIN DESCRIPTION - PROGRESSION
CLINICAL_PROGRESSION: GRADUALLY WORSENING

## 2019-09-03 ASSESSMENT — PAIN DESCRIPTION - FREQUENCY
FREQUENCY: CONTINUOUS
FREQUENCY: CONTINUOUS

## 2019-09-03 ASSESSMENT — PAIN SCALES - GENERAL
PAINLEVEL_OUTOF10: 7
PAINLEVEL_OUTOF10: 4
PAINLEVEL_OUTOF10: 7
PAINLEVEL_OUTOF10: 5

## 2019-09-03 ASSESSMENT — PAIN DESCRIPTION - PAIN TYPE
TYPE: ACUTE PAIN

## 2019-09-03 ASSESSMENT — PAIN DESCRIPTION - DESCRIPTORS
DESCRIPTORS: ACHING;BURNING
DESCRIPTORS: ACHING;BURNING
DESCRIPTORS: ACHING;BURNING;CONSTANT

## 2019-09-03 ASSESSMENT — PAIN DESCRIPTION - ONSET
ONSET: ON-GOING

## 2019-09-03 ASSESSMENT — PAIN DESCRIPTION - ORIENTATION
ORIENTATION: LEFT

## 2019-09-03 ASSESSMENT — PAIN DESCRIPTION - LOCATION
LOCATION: ABDOMEN;BACK

## 2019-09-03 NOTE — PROGRESS NOTES
Patient was assessed and charted on by this nurse. Patient is alert and oriented times four. Patient has no swelling in his extremities. He does have the rash area on left trunk and right arm pit and breast. This has not changed since yesterday. He is receiving iv acyclover ever 8 hours in a single lumen picc line in his right upper arm. Both ports have good blood return and flush great. We anticipated his discharge today but home health is unable to take him at this time because of his insurance, so we will work on that tomorrow per care coordination. Patient was disappointed but understood. Patient has clear lung sounds and positive bowel sounds. He did have a bm this am. Patient is still on airbourne precautions for this shingles virus. He also has pain from this virus and is receiving neurontin for it. He has no new complaints at this time.
Pt has been sleeping all night. No complains safety maintained.
Pt vital signs WNL  Home meds reordered  Pt medicated for pain per orders   Airborne isolation with neg pressure room in place   Pt has shingles visible on abd and back
Pt's HR was 105 this morning, it is now in the 70s. EKG done. He had complaints of 5/10 pain, morphine was given. Bolus infusing now. Spouse at bedside. No needs at this time. Waiting on d/c plans.
Methotrexate, long term, current use    Nicotine dependence, uncomplicated    Ventricular tachycardia (HCC)    GI bleed    Anemia due to acute blood loss    Gastrointestinal hemorrhage with melena    Herpes zoster with complication           ASSESSMENT:  Disseminated shingles  Immunosuppressed status due to RA hx      PLAN:  MTX on hold. IV Acyclovir x 2 weeks  Follow up in 3 weeks. Ok to dc. Imaging and labs were reviewed per medical records and any ID pertinent labs were addressed with the patient.        Avis Gabriel DO
term, current use    Nicotine dependence, uncomplicated    Ventricular tachycardia (HCC)    GI bleed    Anemia due to acute blood loss    Gastrointestinal hemorrhage with melena    Herpes zoster with complication           ASSESSMENT:  Disseminated shingles  Immunosuppressed status due to RA hx    PLAN:  MTX on hold. Original plan was IV Acyclovir x 2 weeks - script in chart per primary. But there is an issue with getting [address] at this time. Discussed with patient. His lesions are drying and he is improving on IV acyclovir. IF he continues to improve, then may be able to change back to PO after 7 full days of IV acyclovir to complete the last week as an oral. Preference is still IV up to 7 days at least.   Pain control   Follow up in 3 weeks with ID.        Avis Gabriel DO

## 2019-09-04 ENCOUNTER — TELEPHONE (OUTPATIENT)
Dept: INFECTIOUS DISEASES | Age: 69
End: 2019-09-04

## 2019-09-09 ENCOUNTER — OFFICE VISIT (OUTPATIENT)
Dept: INFECTIOUS DISEASES | Age: 69
End: 2019-09-09
Payer: COMMERCIAL

## 2019-09-09 VITALS
HEART RATE: 128 BPM | DIASTOLIC BLOOD PRESSURE: 80 MMHG | BODY MASS INDEX: 28.98 KG/M2 | OXYGEN SATURATION: 95 % | TEMPERATURE: 99 F | WEIGHT: 202.4 LBS | RESPIRATION RATE: 18 BRPM | HEIGHT: 70 IN | SYSTOLIC BLOOD PRESSURE: 120 MMHG

## 2019-09-09 DIAGNOSIS — M05.79 RHEUMATOID ARTHRITIS OF MULTIPLE SITES WITHOUT ORGAN OR SYSTEM INVOLVEMENT WITH POSITIVE RHEUMATOID FACTOR (HCC): ICD-10-CM

## 2019-09-09 DIAGNOSIS — B02.8 HERPES ZOSTER WITH COMPLICATION: Primary | ICD-10-CM

## 2019-09-09 LAB
ACANTHOCYTES: ABNORMAL
ANION GAP SERPL CALCULATED.3IONS-SCNC: 12 MEQ/L (ref 9–15)
ANISOCYTOSIS: ABNORMAL
BASOPHILS ABSOLUTE: 0 K/UL (ref 0–0.2)
BASOPHILS RELATIVE PERCENT: 0.5 %
BUN BLDV-MCNC: 22 MG/DL (ref 8–23)
BURR CELLS: ABNORMAL
CALCIUM SERPL-MCNC: 8.8 MG/DL (ref 8.5–9.9)
CHLORIDE BLD-SCNC: 98 MEQ/L (ref 95–107)
CO2: 24 MEQ/L (ref 20–31)
CREAT SERPL-MCNC: 0.79 MG/DL (ref 0.7–1.2)
EOSINOPHILS ABSOLUTE: 0 K/UL (ref 0–0.7)
EOSINOPHILS RELATIVE PERCENT: 0.2 %
GFR AFRICAN AMERICAN: >60
GFR NON-AFRICAN AMERICAN: >60
GLUCOSE BLD-MCNC: 123 MG/DL (ref 70–99)
HCT VFR BLD CALC: 38 % (ref 42–52)
HEMOGLOBIN: 12.2 G/DL (ref 14–18)
HYPOCHROMIA: ABNORMAL
LYMPHOCYTES ABSOLUTE: 1 K/UL (ref 1–4.8)
LYMPHOCYTES RELATIVE PERCENT: 9.2 %
MACROCYTES: ABNORMAL
MCH RBC QN AUTO: 30.4 PG (ref 27–31.3)
MCHC RBC AUTO-ENTMCNC: 32.2 % (ref 33–37)
MCV RBC AUTO: 94.4 FL (ref 80–100)
MICROCYTES: ABNORMAL
MONOCYTES ABSOLUTE: 1.1 K/UL (ref 0.2–0.8)
MONOCYTES RELATIVE PERCENT: 10.3 %
NEUTROPHILS ABSOLUTE: 8.3 K/UL (ref 1.4–6.5)
NEUTROPHILS RELATIVE PERCENT: 79.8 %
OVALOCYTES: ABNORMAL
PDW BLD-RTO: 24.7 % (ref 11.5–14.5)
PLATELET # BLD: 237 K/UL (ref 130–400)
POIKILOCYTES: ABNORMAL
POLYCHROMASIA: ABNORMAL
POTASSIUM SERPL-SCNC: 4.3 MEQ/L (ref 3.4–4.9)
RBC # BLD: 4.02 M/UL (ref 4.7–6.1)
SCHISTOCYTES: ABNORMAL
SODIUM BLD-SCNC: 134 MEQ/L (ref 135–144)
TEAR DROP CELLS: ABNORMAL
WBC # BLD: 10.5 K/UL (ref 4.8–10.8)

## 2019-09-09 PROCEDURE — 99213 OFFICE O/P EST LOW 20 MIN: CPT | Performed by: INTERNAL MEDICINE

## 2019-09-17 LAB
ACANTHOCYTES: ABNORMAL
ANION GAP SERPL CALCULATED.3IONS-SCNC: 13 MEQ/L (ref 9–15)
ANISOCYTOSIS: ABNORMAL
BASOPHILS ABSOLUTE: 0.1 K/UL (ref 0–0.2)
BASOPHILS RELATIVE PERCENT: 1 %
BUN BLDV-MCNC: 15 MG/DL (ref 8–23)
CALCIUM SERPL-MCNC: 9.1 MG/DL (ref 8.5–9.9)
CHLORIDE BLD-SCNC: 99 MEQ/L (ref 95–107)
CO2: 24 MEQ/L (ref 20–31)
CREAT SERPL-MCNC: 0.72 MG/DL (ref 0.7–1.2)
EOSINOPHILS ABSOLUTE: 0.1 K/UL (ref 0–0.7)
EOSINOPHILS RELATIVE PERCENT: 1.1 %
GFR AFRICAN AMERICAN: >60
GFR NON-AFRICAN AMERICAN: >60
GLUCOSE BLD-MCNC: 97 MG/DL (ref 70–99)
HCT VFR BLD CALC: 37.1 % (ref 42–52)
HEMOGLOBIN: 12.1 G/DL (ref 14–18)
LYMPHOCYTES ABSOLUTE: 1.2 K/UL (ref 1–4.8)
LYMPHOCYTES RELATIVE PERCENT: 16.2 %
MCH RBC QN AUTO: 31.5 PG (ref 27–31.3)
MCHC RBC AUTO-ENTMCNC: 32.5 % (ref 33–37)
MCV RBC AUTO: 96.7 FL (ref 80–100)
MONOCYTES ABSOLUTE: 0.8 K/UL (ref 0.2–0.8)
MONOCYTES RELATIVE PERCENT: 10.3 %
NEUTROPHILS ABSOLUTE: 5.2 K/UL (ref 1.4–6.5)
NEUTROPHILS RELATIVE PERCENT: 71.4 %
OVALOCYTES: ABNORMAL
PDW BLD-RTO: 25.2 % (ref 11.5–14.5)
PLATELET # BLD: 239 K/UL (ref 130–400)
POIKILOCYTES: ABNORMAL
POTASSIUM SERPL-SCNC: 4.5 MEQ/L (ref 3.4–4.9)
RBC # BLD: 3.83 M/UL (ref 4.7–6.1)
SODIUM BLD-SCNC: 136 MEQ/L (ref 135–144)
WBC # BLD: 7.3 K/UL (ref 4.8–10.8)

## 2019-09-25 ENCOUNTER — OFFICE VISIT (OUTPATIENT)
Dept: CARDIOLOGY CLINIC | Age: 69
End: 2019-09-25
Payer: COMMERCIAL

## 2019-09-25 VITALS — OXYGEN SATURATION: 96 % | HEART RATE: 80 BPM | DIASTOLIC BLOOD PRESSURE: 80 MMHG | SYSTOLIC BLOOD PRESSURE: 110 MMHG

## 2019-09-25 DIAGNOSIS — I10 ESSENTIAL HYPERTENSION: ICD-10-CM

## 2019-09-25 DIAGNOSIS — I25.2 HISTORY OF NON-ST ELEVATION MYOCARDIAL INFARCTION (NSTEMI): ICD-10-CM

## 2019-09-25 DIAGNOSIS — E78.5 HYPERLIPIDEMIA, UNSPECIFIED HYPERLIPIDEMIA TYPE: ICD-10-CM

## 2019-09-25 DIAGNOSIS — I48.0 PAROXYSMAL ATRIAL FIBRILLATION WITH RVR (HCC): Primary | ICD-10-CM

## 2019-09-25 DIAGNOSIS — I47.20 VENTRICULAR TACHYCARDIA: ICD-10-CM

## 2019-09-25 DIAGNOSIS — K27.4 GASTROINTESTINAL HEMORRHAGE ASSOCIATED WITH PEPTIC ULCER: ICD-10-CM

## 2019-09-25 PROCEDURE — 99213 OFFICE O/P EST LOW 20 MIN: CPT | Performed by: INTERNAL MEDICINE

## 2019-09-25 RX ORDER — WARFARIN SODIUM 4 MG/1
4 TABLET ORAL
COMMUNITY
Start: 2019-09-23 | End: 2020-01-01

## 2019-09-25 NOTE — PROGRESS NOTES
pulses are 2+ on the right side. Dorsalis pedis pulses are 2+ on the right side. Pulmonary/Chest: Effort normal and breath sounds normal. No wheezes. No rales. No tenderness. Abdominal: Soft. Bowel sounds are normal.   Musculoskeletal: Normal range of motion. No edema. Neurological: The patient is alert and oriented to person, place, and time. Intact cranial nerves. Skin: Skin is warm and dry. No rash noted.        LABS:  CBC:   Lab Results   Component Value Date    WBC 7.3 09/17/2019    RBC 3.83 09/17/2019    RBC 4.38 04/26/2012    HGB 12.1 09/17/2019    HCT 37.1 09/17/2019    MCV 96.7 09/17/2019    MCH 31.5 09/17/2019    MCHC 32.5 09/17/2019    RDW 25.2 09/17/2019     09/17/2019    MPV 8.3 04/26/2012     Lipids:  Lab Results   Component Value Date    CHOL 244 (H) 06/20/2013     Lab Results   Component Value Date    TRIG 86 06/20/2013     Lab Results   Component Value Date    HDL 59 06/20/2013     Lab Results   Component Value Date    LDLCALC 168 (H) 06/20/2013     No results found for: LABVLDL, VLDL  No results found for: CHOLHDLRATIO  CMP:    Lab Results   Component Value Date     09/17/2019    K 4.5 09/17/2019    K 4.6 08/30/2019    CL 99 09/17/2019    CO2 24 09/17/2019    BUN 15 09/17/2019    CREATININE 0.72 09/17/2019    GFRAA >60.0 09/17/2019    LABGLOM >60.0 09/17/2019    GLUCOSE 97 09/17/2019    GLUCOSE 95 04/26/2012    PROT 6.3 08/30/2019    LABALBU 3.2 08/30/2019    LABALBU 4.5 04/26/2012    CALCIUM 9.1 09/17/2019    BILITOT <0.2 08/30/2019    ALKPHOS 45 08/30/2019    AST 25 08/30/2019    ALT 29 08/30/2019     BMP:    Lab Results   Component Value Date     09/17/2019    K 4.5 09/17/2019    K 4.6 08/30/2019    CL 99 09/17/2019    CO2 24 09/17/2019    BUN 15 09/17/2019    LABALBU 3.2 08/30/2019    LABALBU 4.5 04/26/2012    CREATININE 0.72 09/17/2019    CALCIUM 9.1 09/17/2019    GFRAA >60.0 09/17/2019    LABGLOM >60.0 09/17/2019    GLUCOSE 97 09/17/2019    GLUCOSE 95

## 2020-01-01 ENCOUNTER — HOSPITAL ENCOUNTER (OUTPATIENT)
Dept: ULTRASOUND IMAGING | Age: 70
Discharge: HOME | End: 2020-01-23
Payer: MEDICARE

## 2020-01-01 ENCOUNTER — OFFICE VISIT (OUTPATIENT)
Dept: CARDIOLOGY CLINIC | Age: 70
End: 2020-01-01
Payer: MEDICARE

## 2020-01-01 ENCOUNTER — APPOINTMENT (OUTPATIENT)
Dept: GENERAL RADIOLOGY | Age: 70
DRG: 283 | End: 2020-01-01
Payer: MEDICARE

## 2020-01-01 ENCOUNTER — HOSPITAL ENCOUNTER (INPATIENT)
Age: 70
LOS: 9 days | DRG: 283 | End: 2020-12-11
Attending: EMERGENCY MEDICINE | Admitting: INTERNAL MEDICINE
Payer: MEDICARE

## 2020-01-01 ENCOUNTER — APPOINTMENT (OUTPATIENT)
Dept: CT IMAGING | Age: 70
DRG: 283 | End: 2020-01-01
Payer: MEDICARE

## 2020-01-01 ENCOUNTER — APPOINTMENT (OUTPATIENT)
Dept: CARDIAC CATH/INVASIVE PROCEDURES | Age: 70
DRG: 283 | End: 2020-01-01
Payer: MEDICARE

## 2020-01-01 VITALS
OXYGEN SATURATION: 95 % | WEIGHT: 209 LBS | DIASTOLIC BLOOD PRESSURE: 80 MMHG | SYSTOLIC BLOOD PRESSURE: 130 MMHG | HEART RATE: 85 BPM | BODY MASS INDEX: 29.99 KG/M2

## 2020-01-01 VITALS
BODY MASS INDEX: 27.77 KG/M2 | OXYGEN SATURATION: 91 % | HEART RATE: 93 BPM | DIASTOLIC BLOOD PRESSURE: 70 MMHG | TEMPERATURE: 97.5 F | SYSTOLIC BLOOD PRESSURE: 138 MMHG | RESPIRATION RATE: 18 BRPM | HEIGHT: 70 IN | WEIGHT: 194 LBS

## 2020-01-01 VITALS
BODY MASS INDEX: 30.13 KG/M2 | HEART RATE: 85 BPM | SYSTOLIC BLOOD PRESSURE: 120 MMHG | OXYGEN SATURATION: 98 % | DIASTOLIC BLOOD PRESSURE: 70 MMHG | WEIGHT: 210 LBS

## 2020-01-01 LAB
ACANTHOCYTES: ABNORMAL
ALBUMIN SERPL-MCNC: 2.2 G/DL (ref 3.5–4.6)
ALBUMIN SERPL-MCNC: 2.3 G/DL (ref 3.5–4.6)
ALBUMIN SERPL-MCNC: 2.4 G/DL (ref 3.5–4.6)
ALBUMIN SERPL-MCNC: 2.4 G/DL (ref 3.5–4.6)
ALBUMIN SERPL-MCNC: 3 G/DL (ref 3.5–4.6)
ALP BLD-CCNC: 38 U/L (ref 35–104)
ALP BLD-CCNC: 48 U/L (ref 35–104)
ALT SERPL-CCNC: 33 U/L (ref 0–41)
ALT SERPL-CCNC: 35 U/L (ref 0–41)
ANA IGG, ELISA: NORMAL
ANION GAP SERPL CALCULATED.3IONS-SCNC: 11 MEQ/L (ref 9–15)
ANION GAP SERPL CALCULATED.3IONS-SCNC: 12 MEQ/L (ref 9–15)
ANION GAP SERPL CALCULATED.3IONS-SCNC: 12 MEQ/L (ref 9–15)
ANION GAP SERPL CALCULATED.3IONS-SCNC: 13 MEQ/L (ref 9–15)
ANION GAP SERPL CALCULATED.3IONS-SCNC: 17 MEQ/L (ref 9–15)
ANION GAP SERPL CALCULATED.3IONS-SCNC: 18 MEQ/L (ref 9–15)
ANISOCYTOSIS: ABNORMAL
AST SERPL-CCNC: 45 U/L (ref 0–40)
AST SERPL-CCNC: 47 U/L (ref 0–40)
BACTERIA: NEGATIVE /HPF
BASE EXCESS ARTERIAL: -15 (ref -3–3)
BASE EXCESS ARTERIAL: -2 (ref -3–3)
BASOPHILS ABSOLUTE: 0 K/UL (ref 0–0.2)
BASOPHILS RELATIVE PERCENT: 0.3 %
BASOPHILS RELATIVE PERCENT: 0.3 %
BASOPHILS RELATIVE PERCENT: 0.5 %
BILIRUB SERPL-MCNC: 0.3 MG/DL (ref 0.2–0.7)
BILIRUB SERPL-MCNC: 0.5 MG/DL (ref 0.2–0.7)
BILIRUBIN DIRECT: <0.2 MG/DL (ref 0–0.4)
BILIRUBIN URINE: NEGATIVE
BILIRUBIN, INDIRECT: ABNORMAL MG/DL (ref 0–0.6)
BLOOD CULTURE, ROUTINE: NORMAL
BLOOD, URINE: NEGATIVE
BUN BLDV-MCNC: 22 MG/DL (ref 8–23)
BUN BLDV-MCNC: 24 MG/DL (ref 8–23)
BUN BLDV-MCNC: 29 MG/DL (ref 8–23)
BUN BLDV-MCNC: 33 MG/DL (ref 8–23)
BUN BLDV-MCNC: 39 MG/DL (ref 8–23)
BUN BLDV-MCNC: 46 MG/DL (ref 8–23)
BUN BLDV-MCNC: 52 MG/DL (ref 8–23)
BUN BLDV-MCNC: 56 MG/DL (ref 8–23)
BURR CELLS: ABNORMAL
BURR CELLS: ABNORMAL
C-REACTIVE PROTEIN, HIGH SENSITIVITY: 231 MG/L (ref 0–5)
C-REACTIVE PROTEIN: 201.1 MG/L (ref 0–5)
C-REACTIVE PROTEIN: 281.5 MG/L (ref 0–5)
CALCIUM IONIZED: 0.94 MMOL/L (ref 1.12–1.32)
CALCIUM IONIZED: 1.26 MMOL/L (ref 1.12–1.32)
CALCIUM SERPL-MCNC: 7.7 MG/DL (ref 8.5–9.9)
CALCIUM SERPL-MCNC: 7.9 MG/DL (ref 8.5–9.9)
CALCIUM SERPL-MCNC: 8.6 MG/DL (ref 8.5–9.9)
CALCIUM SERPL-MCNC: 8.7 MG/DL (ref 8.5–9.9)
CALCIUM SERPL-MCNC: 8.8 MG/DL (ref 8.5–9.9)
CALCIUM SERPL-MCNC: 9.1 MG/DL (ref 8.5–9.9)
CHLORIDE BLD-SCNC: 100 MEQ/L (ref 95–107)
CHLORIDE BLD-SCNC: 101 MEQ/L (ref 95–107)
CHLORIDE BLD-SCNC: 101 MEQ/L (ref 95–107)
CHLORIDE BLD-SCNC: 103 MEQ/L (ref 95–107)
CHLORIDE BLD-SCNC: 98 MEQ/L (ref 95–107)
CHLORIDE BLD-SCNC: 99 MEQ/L (ref 95–107)
CHOLESTEROL, TOTAL: 136 MG/DL (ref 0–199)
CLARITY: CLEAR
CO2: 15 MEQ/L (ref 20–31)
CO2: 15 MEQ/L (ref 20–31)
CO2: 17 MEQ/L (ref 20–31)
CO2: 17 MEQ/L (ref 20–31)
CO2: 18 MEQ/L (ref 20–31)
CO2: 20 MEQ/L (ref 20–31)
CO2: 20 MEQ/L (ref 20–31)
CO2: 21 MEQ/L (ref 20–31)
COLOR: YELLOW
CREAT SERPL-MCNC: 0.85 MG/DL (ref 0.7–1.2)
CREAT SERPL-MCNC: 0.87 MG/DL (ref 0.7–1.2)
CREAT SERPL-MCNC: 0.88 MG/DL (ref 0.7–1.2)
CREAT SERPL-MCNC: 1.06 MG/DL (ref 0.7–1.2)
CREAT SERPL-MCNC: 1.23 MG/DL (ref 0.7–1.2)
CREAT SERPL-MCNC: 1.32 MG/DL (ref 0.7–1.2)
CREAT SERPL-MCNC: 1.45 MG/DL (ref 0.7–1.2)
CREAT SERPL-MCNC: 1.52 MG/DL (ref 0.7–1.2)
CULTURE, BLOOD 2: NORMAL
D DIMER: 0.83 MG/L FEU (ref 0–0.5)
EKG ATRIAL RATE: 89 BPM
EKG ATRIAL RATE: 92 BPM
EKG P AXIS: 30 DEGREES
EKG P AXIS: 61 DEGREES
EKG P-R INTERVAL: 142 MS
EKG P-R INTERVAL: 148 MS
EKG Q-T INTERVAL: 314 MS
EKG Q-T INTERVAL: 330 MS
EKG QRS DURATION: 78 MS
EKG QRS DURATION: 82 MS
EKG QTC CALCULATION (BAZETT): 388 MS
EKG QTC CALCULATION (BAZETT): 401 MS
EKG R AXIS: 77 DEGREES
EKG R AXIS: 77 DEGREES
EKG T AXIS: 36 DEGREES
EKG T AXIS: 44 DEGREES
EKG VENTRICULAR RATE: 89 BPM
EKG VENTRICULAR RATE: 92 BPM
EOSINOPHILS ABSOLUTE: 0 K/UL (ref 0–0.7)
EOSINOPHILS ABSOLUTE: 0 K/UL (ref 0–0.7)
EOSINOPHILS ABSOLUTE: 0.1 K/UL (ref 0–0.7)
EOSINOPHILS RELATIVE PERCENT: 0.2 %
EOSINOPHILS RELATIVE PERCENT: 0.5 %
EOSINOPHILS RELATIVE PERCENT: 1.2 %
EPITHELIAL CELLS, UA: NORMAL /HPF (ref 0–5)
FERRITIN: 453.4 NG/ML (ref 30–400)
FOLATE: 12.7 NG/ML (ref 7.3–26.1)
GFR AFRICAN AMERICAN: 40
GFR AFRICAN AMERICAN: 55.4
GFR AFRICAN AMERICAN: 58.5
GFR AFRICAN AMERICAN: >60
GFR NON-AFRICAN AMERICAN: 33
GFR NON-AFRICAN AMERICAN: 45.8
GFR NON-AFRICAN AMERICAN: 48.4
GFR NON-AFRICAN AMERICAN: 53.9
GFR NON-AFRICAN AMERICAN: 55
GFR NON-AFRICAN AMERICAN: 58.5
GFR NON-AFRICAN AMERICAN: >60
GLOBULIN: 3 G/DL (ref 2.3–3.5)
GLUCOSE BLD-MCNC: 101 MG/DL (ref 70–99)
GLUCOSE BLD-MCNC: 105 MG/DL (ref 70–99)
GLUCOSE BLD-MCNC: 116 MG/DL (ref 70–99)
GLUCOSE BLD-MCNC: 125 MG/DL (ref 70–99)
GLUCOSE BLD-MCNC: 133 MG/DL (ref 60–115)
GLUCOSE BLD-MCNC: 158 MG/DL (ref 70–99)
GLUCOSE BLD-MCNC: 185 MG/DL (ref 70–99)
GLUCOSE BLD-MCNC: 209 MG/DL (ref 60–115)
GLUCOSE BLD-MCNC: 460 MG/DL (ref 60–115)
GLUCOSE BLD-MCNC: 85 MG/DL (ref 70–99)
GLUCOSE BLD-MCNC: 96 MG/DL (ref 70–99)
GLUCOSE URINE: 100 MG/DL
HCO3 ARTERIAL: 18.4 MMOL/L (ref 21–29)
HCO3 ARTERIAL: 20.9 MMOL/L (ref 21–29)
HCT VFR BLD CALC: 22.8 % (ref 42–52)
HCT VFR BLD CALC: 22.9 % (ref 42–52)
HCT VFR BLD CALC: 22.9 % (ref 42–52)
HCT VFR BLD CALC: 24 % (ref 42–52)
HCT VFR BLD CALC: 25.5 % (ref 42–52)
HCT VFR BLD CALC: 25.6 % (ref 42–52)
HCT VFR BLD CALC: 25.7 % (ref 42–52)
HCT VFR BLD CALC: 26.1 % (ref 42–52)
HCT VFR BLD CALC: 27.4 % (ref 42–52)
HCT VFR BLD CALC: 27.7 % (ref 42–52)
HCT VFR BLD CALC: 27.9 % (ref 42–52)
HDLC SERPL-MCNC: 38 MG/DL (ref 40–59)
HEMATOLOGY PATH CONSULT: NORMAL
HEMATOLOGY PATH CONSULT: YES
HEMOGLOBIN: 6.6 GM/DL (ref 13.5–17.5)
HEMOGLOBIN: 7.2 G/DL (ref 14–18)
HEMOGLOBIN: 7.3 G/DL (ref 14–18)
HEMOGLOBIN: 7.3 G/DL (ref 14–18)
HEMOGLOBIN: 7.6 G/DL (ref 14–18)
HEMOGLOBIN: 8.1 G/DL (ref 14–18)
HEMOGLOBIN: 8.1 G/DL (ref 14–18)
HEMOGLOBIN: 8.2 G/DL (ref 14–18)
HEMOGLOBIN: 8.3 G/DL (ref 14–18)
HEMOGLOBIN: 8.3 GM/DL (ref 13.5–17.5)
HEMOGLOBIN: 8.7 G/DL (ref 14–18)
HEMOGLOBIN: 8.8 G/DL (ref 14–18)
HEMOGLOBIN: 9 G/DL (ref 14–18)
HYALINE CASTS: NORMAL /HPF (ref 0–5)
HYPOCHROMIA: ABNORMAL
INR BLD: 1.2
INR BLD: 1.4
INR BLD: 1.7
INR BLD: 1.8
INR BLD: 2.3
INR BLD: 2.5
INR BLD: 2.7
INR BLD: 2.8
INR BLD: 2.9
INR BLD: 3.2
INR BLD: 4.1
IRON SATURATION: 5 % (ref 11–46)
IRON: 10 UG/DL (ref 59–158)
KETONES, URINE: NEGATIVE MG/DL
LACTATE: 18.33 MMOL/L (ref 0.4–2)
LACTATE: 2.71 MMOL/L (ref 0.4–2)
LACTIC ACID: 3.4 MMOL/L (ref 0.5–2.2)
LDL CHOLESTEROL CALCULATED: 49 MG/DL (ref 0–129)
LEUKOCYTE ESTERASE, URINE: NEGATIVE
LIPASE: 42 U/L (ref 12–95)
LV EF: 60 %
LVEF MODALITY: NORMAL
LYMPHOCYTES ABSOLUTE: 0.1 K/UL (ref 1–4.8)
LYMPHOCYTES ABSOLUTE: 0.3 K/UL (ref 1–4.8)
LYMPHOCYTES ABSOLUTE: 0.6 K/UL (ref 1–4.8)
LYMPHOCYTES RELATIVE PERCENT: 1.6 %
LYMPHOCYTES RELATIVE PERCENT: 3.5 %
LYMPHOCYTES RELATIVE PERCENT: 6.8 %
MACROCYTES: ABNORMAL
MACROCYTES: ABNORMAL
MAGNESIUM: 1.4 MG/DL (ref 1.7–2.4)
MAGNESIUM: 1.5 MG/DL (ref 1.7–2.4)
MAGNESIUM: 1.6 MG/DL (ref 1.7–2.4)
MAGNESIUM: 1.9 MG/DL (ref 1.7–2.4)
MAGNESIUM: 2.1 MG/DL (ref 1.7–2.4)
MCH RBC QN AUTO: 25.5 PG (ref 27–31.3)
MCH RBC QN AUTO: 25.6 PG (ref 27–31.3)
MCH RBC QN AUTO: 25.8 PG (ref 27–31.3)
MCH RBC QN AUTO: 25.8 PG (ref 27–31.3)
MCH RBC QN AUTO: 25.9 PG (ref 27–31.3)
MCH RBC QN AUTO: 26 PG (ref 27–31.3)
MCH RBC QN AUTO: 26 PG (ref 27–31.3)
MCH RBC QN AUTO: 26.1 PG (ref 27–31.3)
MCH RBC QN AUTO: 26.2 PG (ref 27–31.3)
MCH RBC QN AUTO: 26.3 PG (ref 27–31.3)
MCH RBC QN AUTO: 26.4 PG (ref 27–31.3)
MCHC RBC AUTO-ENTMCNC: 31.4 % (ref 33–37)
MCHC RBC AUTO-ENTMCNC: 31.5 % (ref 33–37)
MCHC RBC AUTO-ENTMCNC: 31.5 % (ref 33–37)
MCHC RBC AUTO-ENTMCNC: 31.6 % (ref 33–37)
MCHC RBC AUTO-ENTMCNC: 31.7 % (ref 33–37)
MCHC RBC AUTO-ENTMCNC: 31.7 % (ref 33–37)
MCHC RBC AUTO-ENTMCNC: 31.9 % (ref 33–37)
MCHC RBC AUTO-ENTMCNC: 32 % (ref 33–37)
MCHC RBC AUTO-ENTMCNC: 32 % (ref 33–37)
MCHC RBC AUTO-ENTMCNC: 32.2 % (ref 33–37)
MCHC RBC AUTO-ENTMCNC: 32.3 % (ref 33–37)
MCV RBC AUTO: 79.2 FL (ref 80–100)
MCV RBC AUTO: 80.6 FL (ref 80–100)
MCV RBC AUTO: 80.7 FL (ref 80–100)
MCV RBC AUTO: 80.8 FL (ref 80–100)
MCV RBC AUTO: 80.9 FL (ref 80–100)
MCV RBC AUTO: 81.5 FL (ref 80–100)
MCV RBC AUTO: 82.3 FL (ref 80–100)
MCV RBC AUTO: 82.6 FL (ref 80–100)
MCV RBC AUTO: 82.8 FL (ref 80–100)
MCV RBC AUTO: 83.3 FL (ref 80–100)
MCV RBC AUTO: 83.4 FL (ref 80–100)
MICROCYTES: ABNORMAL
MICROCYTES: ABNORMAL
MONOCYTES ABSOLUTE: 0 K/UL (ref 0.2–0.8)
MONOCYTES ABSOLUTE: 0.1 K/UL (ref 0.2–0.8)
MONOCYTES ABSOLUTE: 0.2 K/UL (ref 0.2–0.8)
MONOCYTES RELATIVE PERCENT: 0.4 %
MONOCYTES RELATIVE PERCENT: 1.5 %
MONOCYTES RELATIVE PERCENT: 2.4 %
MYELOPEROXIDASE AB: 0 AU/ML (ref 0–19)
NEUTROPHILS ABSOLUTE: 6.8 K/UL (ref 1.4–6.5)
NEUTROPHILS ABSOLUTE: 7.3 K/UL (ref 1.4–6.5)
NEUTROPHILS ABSOLUTE: 7.7 K/UL (ref 1.4–6.5)
NEUTROPHILS RELATIVE PERCENT: 90 %
NEUTROPHILS RELATIVE PERCENT: 93.6 %
NEUTROPHILS RELATIVE PERCENT: 97.2 %
NITRITE, URINE: NEGATIVE
O2 SAT, ARTERIAL: 39 % (ref 93–100)
O2 SAT, ARTERIAL: 97 % (ref 93–100)
OVALOCYTES: ABNORMAL
PCO2 ARTERIAL: 106 MM HG (ref 35–45)
PCO2 ARTERIAL: 24 MM HG (ref 35–45)
PDW BLD-RTO: 29.4 % (ref 11.5–14.5)
PDW BLD-RTO: 29.6 % (ref 11.5–14.5)
PDW BLD-RTO: 29.8 % (ref 11.5–14.5)
PDW BLD-RTO: 30.3 % (ref 11.5–14.5)
PDW BLD-RTO: 30.4 % (ref 11.5–14.5)
PDW BLD-RTO: 30.6 % (ref 11.5–14.5)
PDW BLD-RTO: 30.6 % (ref 11.5–14.5)
PDW BLD-RTO: 31.5 % (ref 11.5–14.5)
PDW BLD-RTO: 31.6 % (ref 11.5–14.5)
PDW BLD-RTO: 31.8 % (ref 11.5–14.5)
PDW BLD-RTO: 32.7 % (ref 11.5–14.5)
PERFORMED ON: ABNORMAL
PH ARTERIAL: 6.85 (ref 7.35–7.45)
PH ARTERIAL: 7.54 (ref 7.35–7.45)
PH UA: 5.5 (ref 5–9)
PHOSPHORUS: 1.8 MG/DL (ref 2.3–4.8)
PHOSPHORUS: 3 MG/DL (ref 2.3–4.8)
PHOSPHORUS: 3.2 MG/DL (ref 2.3–4.8)
PHOSPHORUS: 5 MG/DL (ref 2.3–4.8)
PLATELET # BLD: 153 K/UL (ref 130–400)
PLATELET # BLD: 160 K/UL (ref 130–400)
PLATELET # BLD: 174 K/UL (ref 130–400)
PLATELET # BLD: 174 K/UL (ref 130–400)
PLATELET # BLD: 179 K/UL (ref 130–400)
PLATELET # BLD: 186 K/UL (ref 130–400)
PLATELET # BLD: 188 K/UL (ref 130–400)
PLATELET # BLD: 193 K/UL (ref 130–400)
PLATELET # BLD: 198 K/UL (ref 130–400)
PLATELET # BLD: 205 K/UL (ref 130–400)
PLATELET # BLD: 208 K/UL (ref 130–400)
PLATELET SLIDE REVIEW: NORMAL
PO2 ARTERIAL: 41 MM HG (ref 75–108)
PO2 ARTERIAL: 75 MM HG (ref 75–108)
POC CHLORIDE: 102 MEQ/L (ref 99–110)
POC CHLORIDE: 104 MEQ/L (ref 99–110)
POC CREATININE: 1.3 MG/DL (ref 0.8–1.3)
POC CREATININE: 2 MG/DL (ref 0.8–1.3)
POC FIO2: 100
POC HEMATOCRIT: 19 % (ref 41–53)
POC HEMATOCRIT: 24 % (ref 41–53)
POC POTASSIUM: 4.2 MEQ/L (ref 3.5–5.1)
POC POTASSIUM: 7.5 MEQ/L (ref 3.5–5.1)
POC SAMPLE TYPE: ABNORMAL
POC SAMPLE TYPE: ABNORMAL
POC SODIUM: 129 MEQ/L (ref 136–145)
POC SODIUM: 138 MEQ/L (ref 136–145)
POIKILOCYTES: ABNORMAL
POLYCHROMASIA: ABNORMAL
POTASSIUM SERPL-SCNC: 4 MEQ/L (ref 3.4–4.9)
POTASSIUM SERPL-SCNC: 4.2 MEQ/L (ref 3.4–4.9)
POTASSIUM SERPL-SCNC: 4.3 MEQ/L (ref 3.4–4.9)
POTASSIUM SERPL-SCNC: 4.4 MEQ/L (ref 3.4–4.9)
POTASSIUM SERPL-SCNC: 4.8 MEQ/L (ref 3.4–4.9)
POTASSIUM SERPL-SCNC: 4.9 MEQ/L (ref 3.4–4.9)
POTASSIUM SERPL-SCNC: 5.1 MEQ/L (ref 3.4–4.9)
POTASSIUM SERPL-SCNC: 5.2 MEQ/L (ref 3.4–4.9)
PRO-BNP: 734 PG/ML
PROCALCITONIN: 0.3 NG/ML (ref 0–0.15)
PROCALCITONIN: 0.67 NG/ML (ref 0–0.15)
PROTEIN UA: 100 MG/DL
PROTHROMBIN TIME: 15.6 SEC (ref 12.3–14.9)
PROTHROMBIN TIME: 17.5 SEC (ref 12.3–14.9)
PROTHROMBIN TIME: 20.2 SEC (ref 12.3–14.9)
PROTHROMBIN TIME: 21.2 SEC (ref 12.3–14.9)
PROTHROMBIN TIME: 25 SEC (ref 12.3–14.9)
PROTHROMBIN TIME: 26.7 SEC (ref 12.3–14.9)
PROTHROMBIN TIME: 28.6 SEC (ref 12.3–14.9)
PROTHROMBIN TIME: 29.3 SEC (ref 12.3–14.9)
PROTHROMBIN TIME: 30 SEC (ref 12.3–14.9)
PROTHROMBIN TIME: 32.5 SEC (ref 12.3–14.9)
PROTHROMBIN TIME: 39.1 SEC (ref 12.3–14.9)
RBC # BLD: 2.76 M/UL (ref 4.7–6.1)
RBC # BLD: 2.77 M/UL (ref 4.7–6.1)
RBC # BLD: 2.84 M/UL (ref 4.7–6.1)
RBC # BLD: 2.91 M/UL (ref 4.7–6.1)
RBC # BLD: 3.14 M/UL (ref 4.7–6.1)
RBC # BLD: 3.14 M/UL (ref 4.7–6.1)
RBC # BLD: 3.16 M/UL (ref 4.7–6.1)
RBC # BLD: 3.17 M/UL (ref 4.7–6.1)
RBC # BLD: 3.32 M/UL (ref 4.7–6.1)
RBC # BLD: 3.46 M/UL (ref 4.7–6.1)
RBC # BLD: 3.47 M/UL (ref 4.7–6.1)
RBC UA: NORMAL /HPF (ref 0–5)
REASON FOR REJECTION: NORMAL
REJECTED TEST: NORMAL
SARS-COV-2, NAAT: NOT DETECTED
SCHISTOCYTES: 0
SCHISTOCYTES: ABNORMAL
SEDIMENTATION RATE, ERYTHROCYTE: 114 MM (ref 0–20)
SEDIMENTATION RATE, ERYTHROCYTE: 116 MM (ref 0–20)
SERINE PROTEASE 3 AB: 1 AU/ML (ref 0–19)
SODIUM BLD-SCNC: 128 MEQ/L (ref 135–144)
SODIUM BLD-SCNC: 130 MEQ/L (ref 135–144)
SODIUM BLD-SCNC: 131 MEQ/L (ref 135–144)
SODIUM BLD-SCNC: 131 MEQ/L (ref 135–144)
SODIUM BLD-SCNC: 133 MEQ/L (ref 135–144)
SODIUM BLD-SCNC: 133 MEQ/L (ref 135–144)
SODIUM BLD-SCNC: 134 MEQ/L (ref 135–144)
SODIUM BLD-SCNC: 134 MEQ/L (ref 135–144)
SPECIFIC GRAVITY UA: 1.02 (ref 1–1.03)
T4 FREE: 0.76 NG/DL (ref 0.84–1.68)
TCO2 ARTERIAL: 22 (ref 22–29)
TCO2 ARTERIAL: 22 (ref 22–29)
TEAR DROP CELLS: ABNORMAL
TOTAL IRON BINDING CAPACITY: 205 UG/DL (ref 178–450)
TOTAL PROTEIN: 5.7 G/DL (ref 6.3–8)
TOTAL PROTEIN: 6 G/DL (ref 6.3–8)
TRIGL SERPL-MCNC: 247 MG/DL (ref 0–150)
TROPONIN: 0.07 NG/ML (ref 0–0.01)
TROPONIN: 0.07 NG/ML (ref 0–0.01)
TROPONIN: 0.14 NG/ML (ref 0–0.01)
TSH SERPL DL<=0.05 MIU/L-ACNC: 0.42 UIU/ML (ref 0.44–3.86)
URINE CULTURE, ROUTINE: NORMAL
UROBILINOGEN, URINE: 0.2 E.U./DL
VITAMIN B-12: 657 PG/ML (ref 232–1245)
WBC # BLD: 4.4 K/UL (ref 4.8–10.8)
WBC # BLD: 5.6 K/UL (ref 4.8–10.8)
WBC # BLD: 5.8 K/UL (ref 4.8–10.8)
WBC # BLD: 5.9 K/UL (ref 4.8–10.8)
WBC # BLD: 6.4 K/UL (ref 4.8–10.8)
WBC # BLD: 6.6 K/UL (ref 4.8–10.8)
WBC # BLD: 6.6 K/UL (ref 4.8–10.8)
WBC # BLD: 7.2 K/UL (ref 4.8–10.8)
WBC # BLD: 7.7 K/UL (ref 4.8–10.8)
WBC # BLD: 8 K/UL (ref 4.8–10.8)
WBC # BLD: 8.1 K/UL (ref 4.8–10.8)
WBC UA: NORMAL /HPF (ref 0–5)

## 2020-01-01 PROCEDURE — 6370000000 HC RX 637 (ALT 250 FOR IP): Performed by: PHYSICIAN ASSISTANT

## 2020-01-01 PROCEDURE — 82746 ASSAY OF FOLIC ACID SERUM: CPT

## 2020-01-01 PROCEDURE — 2580000003 HC RX 258: Performed by: PHYSICIAN ASSISTANT

## 2020-01-01 PROCEDURE — 84443 ASSAY THYROID STIM HORMONE: CPT

## 2020-01-01 PROCEDURE — 97110 THERAPEUTIC EXERCISES: CPT

## 2020-01-01 PROCEDURE — 94640 AIRWAY INHALATION TREATMENT: CPT

## 2020-01-01 PROCEDURE — C1887 CATHETER, GUIDING: HCPCS

## 2020-01-01 PROCEDURE — 83735 ASSAY OF MAGNESIUM: CPT

## 2020-01-01 PROCEDURE — 84132 ASSAY OF SERUM POTASSIUM: CPT

## 2020-01-01 PROCEDURE — 2580000003 HC RX 258: Performed by: INTERNAL MEDICINE

## 2020-01-01 PROCEDURE — 80069 RENAL FUNCTION PANEL: CPT

## 2020-01-01 PROCEDURE — 86141 C-REACTIVE PROTEIN HS: CPT

## 2020-01-01 PROCEDURE — 6360000002 HC RX W HCPCS: Performed by: INTERNAL MEDICINE

## 2020-01-01 PROCEDURE — 6370000000 HC RX 637 (ALT 250 FOR IP): Performed by: INTERNAL MEDICINE

## 2020-01-01 PROCEDURE — 3017F COLORECTAL CA SCREEN DOC REV: CPT | Performed by: INTERNAL MEDICINE

## 2020-01-01 PROCEDURE — 99223 1ST HOSP IP/OBS HIGH 75: CPT | Performed by: INTERNAL MEDICINE

## 2020-01-01 PROCEDURE — 2500000003 HC RX 250 WO HCPCS: Performed by: INTERNAL MEDICINE

## 2020-01-01 PROCEDURE — G8484 FLU IMMUNIZE NO ADMIN: HCPCS | Performed by: INTERNAL MEDICINE

## 2020-01-01 PROCEDURE — 99213 OFFICE O/P EST LOW 20 MIN: CPT | Performed by: INTERNAL MEDICINE

## 2020-01-01 PROCEDURE — G8417 CALC BMI ABV UP PARAM F/U: HCPCS | Performed by: INTERNAL MEDICINE

## 2020-01-01 PROCEDURE — 86140 C-REACTIVE PROTEIN: CPT

## 2020-01-01 PROCEDURE — 2060000000 HC ICU INTERMEDIATE R&B

## 2020-01-01 PROCEDURE — 36415 COLL VENOUS BLD VENIPUNCTURE: CPT

## 2020-01-01 PROCEDURE — 96365 THER/PROPH/DIAG IV INF INIT: CPT

## 2020-01-01 PROCEDURE — 85610 PROTHROMBIN TIME: CPT

## 2020-01-01 PROCEDURE — 99232 SBSQ HOSP IP/OBS MODERATE 35: CPT | Performed by: INTERNAL MEDICINE

## 2020-01-01 PROCEDURE — 93306 TTE W/DOPPLER COMPLETE: CPT

## 2020-01-01 PROCEDURE — 82565 ASSAY OF CREATININE: CPT

## 2020-01-01 PROCEDURE — 82330 ASSAY OF CALCIUM: CPT

## 2020-01-01 PROCEDURE — 71045 X-RAY EXAM CHEST 1 VIEW: CPT

## 2020-01-01 PROCEDURE — 83550 IRON BINDING TEST: CPT

## 2020-01-01 PROCEDURE — 2700000000 HC OXYGEN THERAPY PER DAY

## 2020-01-01 PROCEDURE — 85025 COMPLETE CBC W/AUTO DIFF WBC: CPT

## 2020-01-01 PROCEDURE — 85027 COMPLETE CBC AUTOMATED: CPT

## 2020-01-01 PROCEDURE — 99239 HOSP IP/OBS DSCHRG MGMT >30: CPT | Performed by: INTERNAL MEDICINE

## 2020-01-01 PROCEDURE — B2111ZZ FLUOROSCOPY OF MULTIPLE CORONARY ARTERIES USING LOW OSMOLAR CONTRAST: ICD-10-PCS | Performed by: INTERNAL MEDICINE

## 2020-01-01 PROCEDURE — 84145 PROCALCITONIN (PCT): CPT

## 2020-01-01 PROCEDURE — 80048 BASIC METABOLIC PNL TOTAL CA: CPT

## 2020-01-01 PROCEDURE — G0378 HOSPITAL OBSERVATION PER HR: HCPCS

## 2020-01-01 PROCEDURE — 6360000004 HC RX CONTRAST MEDICATION: Performed by: INTERNAL MEDICINE

## 2020-01-01 PROCEDURE — 83690 ASSAY OF LIPASE: CPT

## 2020-01-01 PROCEDURE — 93010 ELECTROCARDIOGRAM REPORT: CPT | Performed by: INTERNAL MEDICINE

## 2020-01-01 PROCEDURE — APPNB60 APP NON BILLABLE TIME 46-60 MINS: Performed by: PHYSICIAN ASSISTANT

## 2020-01-01 PROCEDURE — U0002 COVID-19 LAB TEST NON-CDC: HCPCS

## 2020-01-01 PROCEDURE — 84484 ASSAY OF TROPONIN QUANT: CPT

## 2020-01-01 PROCEDURE — 1036F TOBACCO NON-USER: CPT | Performed by: INTERNAL MEDICINE

## 2020-01-01 PROCEDURE — 94761 N-INVAS EAR/PLS OXIMETRY MLT: CPT

## 2020-01-01 PROCEDURE — 6360000004 HC RX CONTRAST MEDICATION: Performed by: EMERGENCY MEDICINE

## 2020-01-01 PROCEDURE — 85014 HEMATOCRIT: CPT

## 2020-01-01 PROCEDURE — 81003 URINALYSIS AUTO W/O SCOPE: CPT

## 2020-01-01 PROCEDURE — 94664 DEMO&/EVAL PT USE INHALER: CPT

## 2020-01-01 PROCEDURE — 99285 EMERGENCY DEPT VISIT HI MDM: CPT

## 2020-01-01 PROCEDURE — 82435 ASSAY OF BLOOD CHLORIDE: CPT

## 2020-01-01 PROCEDURE — 84439 ASSAY OF FREE THYROXINE: CPT

## 2020-01-01 PROCEDURE — 93005 ELECTROCARDIOGRAM TRACING: CPT | Performed by: INTERNAL MEDICINE

## 2020-01-01 PROCEDURE — 2580000003 HC RX 258: Performed by: EMERGENCY MEDICINE

## 2020-01-01 PROCEDURE — 4040F PNEUMOC VAC/ADMIN/RCVD: CPT | Performed by: INTERNAL MEDICINE

## 2020-01-01 PROCEDURE — 99233 SBSQ HOSP IP/OBS HIGH 50: CPT | Performed by: INTERNAL MEDICINE

## 2020-01-01 PROCEDURE — 83605 ASSAY OF LACTIC ACID: CPT

## 2020-01-01 PROCEDURE — 80076 HEPATIC FUNCTION PANEL: CPT

## 2020-01-01 PROCEDURE — 80061 LIPID PANEL: CPT

## 2020-01-01 PROCEDURE — 1123F ACP DISCUSS/DSCN MKR DOCD: CPT | Performed by: INTERNAL MEDICINE

## 2020-01-01 PROCEDURE — 87040 BLOOD CULTURE FOR BACTERIA: CPT

## 2020-01-01 PROCEDURE — 84295 ASSAY OF SERUM SODIUM: CPT

## 2020-01-01 PROCEDURE — 6360000002 HC RX W HCPCS

## 2020-01-01 PROCEDURE — 2500000003 HC RX 250 WO HCPCS

## 2020-01-01 PROCEDURE — 83540 ASSAY OF IRON: CPT

## 2020-01-01 PROCEDURE — 87086 URINE CULTURE/COLONY COUNT: CPT

## 2020-01-01 PROCEDURE — 74176 CT ABD & PELVIS W/O CONTRAST: CPT

## 2020-01-01 PROCEDURE — 84100 ASSAY OF PHOSPHORUS: CPT

## 2020-01-01 PROCEDURE — 86038 ANTINUCLEAR ANTIBODIES: CPT

## 2020-01-01 PROCEDURE — 71250 CT THORAX DX C-: CPT

## 2020-01-01 PROCEDURE — 83880 ASSAY OF NATRIURETIC PEPTIDE: CPT

## 2020-01-01 PROCEDURE — C1725 CATH, TRANSLUMIN NON-LASER: HCPCS

## 2020-01-01 PROCEDURE — 71275 CT ANGIOGRAPHY CHEST: CPT

## 2020-01-01 PROCEDURE — 82803 BLOOD GASES ANY COMBINATION: CPT

## 2020-01-01 PROCEDURE — 4A023N7 MEASUREMENT OF CARDIAC SAMPLING AND PRESSURE, LEFT HEART, PERCUTANEOUS APPROACH: ICD-10-PCS | Performed by: INTERNAL MEDICINE

## 2020-01-01 PROCEDURE — B2151ZZ FLUOROSCOPY OF LEFT HEART USING LOW OSMOLAR CONTRAST: ICD-10-PCS | Performed by: INTERNAL MEDICINE

## 2020-01-01 PROCEDURE — 85379 FIBRIN DEGRADATION QUANT: CPT

## 2020-01-01 PROCEDURE — G8427 DOCREV CUR MEDS BY ELIG CLIN: HCPCS | Performed by: INTERNAL MEDICINE

## 2020-01-01 PROCEDURE — 6360000002 HC RX W HCPCS: Performed by: PHYSICIAN ASSISTANT

## 2020-01-01 PROCEDURE — C1769 GUIDE WIRE: HCPCS

## 2020-01-01 PROCEDURE — 2709999900 HC NON-CHARGEABLE SUPPLY

## 2020-01-01 PROCEDURE — 2580000003 HC RX 258

## 2020-01-01 PROCEDURE — 93880 EXTRACRANIAL BILAT STUDY: CPT

## 2020-01-01 PROCEDURE — 99222 1ST HOSP IP/OBS MODERATE 55: CPT | Performed by: INTERNAL MEDICINE

## 2020-01-01 PROCEDURE — 6370000000 HC RX 637 (ALT 250 FOR IP): Performed by: EMERGENCY MEDICINE

## 2020-01-01 PROCEDURE — C1894 INTRO/SHEATH, NON-LASER: HCPCS

## 2020-01-01 PROCEDURE — 36600 WITHDRAWAL OF ARTERIAL BLOOD: CPT

## 2020-01-01 PROCEDURE — 1210000000 HC MED SURG R&B

## 2020-01-01 PROCEDURE — 93458 L HRT ARTERY/VENTRICLE ANGIO: CPT | Performed by: INTERNAL MEDICINE

## 2020-01-01 PROCEDURE — 97162 PT EVAL MOD COMPLEX 30 MIN: CPT

## 2020-01-01 PROCEDURE — 82607 VITAMIN B-12: CPT

## 2020-01-01 PROCEDURE — 97116 GAIT TRAINING THERAPY: CPT

## 2020-01-01 PROCEDURE — 83516 IMMUNOASSAY NONANTIBODY: CPT

## 2020-01-01 PROCEDURE — 85652 RBC SED RATE AUTOMATED: CPT

## 2020-01-01 PROCEDURE — 80053 COMPREHEN METABOLIC PANEL: CPT

## 2020-01-01 PROCEDURE — 82728 ASSAY OF FERRITIN: CPT

## 2020-01-01 PROCEDURE — 93005 ELECTROCARDIOGRAM TRACING: CPT | Performed by: EMERGENCY MEDICINE

## 2020-01-01 PROCEDURE — APPNB30 APP NON BILLABLE TIME 0-30 MINS: Performed by: PHYSICIAN ASSISTANT

## 2020-01-01 RX ORDER — KETOROLAC TROMETHAMINE 30 MG/ML
30 INJECTION, SOLUTION INTRAMUSCULAR; INTRAVENOUS ONCE
Status: COMPLETED | OUTPATIENT
Start: 2020-01-01 | End: 2020-01-01

## 2020-01-01 RX ORDER — ACETAMINOPHEN, ASPIRIN AND CAFFEINE 250; 250; 65 MG/1; MG/1; MG/1
1 TABLET, FILM COATED ORAL EVERY 6 HOURS PRN
Status: DISCONTINUED | OUTPATIENT
Start: 2020-01-01 | End: 2020-01-01 | Stop reason: HOSPADM

## 2020-01-01 RX ORDER — FUROSEMIDE 10 MG/ML
20 INJECTION INTRAMUSCULAR; INTRAVENOUS ONCE
Status: COMPLETED | OUTPATIENT
Start: 2020-01-01 | End: 2020-01-01

## 2020-01-01 RX ORDER — FOLIC ACID 1 MG/1
1 TABLET ORAL DAILY
Status: DISCONTINUED | OUTPATIENT
Start: 2020-01-01 | End: 2020-01-01 | Stop reason: HOSPADM

## 2020-01-01 RX ORDER — LEFLUNOMIDE 20 MG/1
20 TABLET ORAL DAILY
COMMUNITY

## 2020-01-01 RX ORDER — ACETAMINOPHEN 325 MG/1
650 TABLET ORAL EVERY 6 HOURS PRN
Status: DISCONTINUED | OUTPATIENT
Start: 2020-01-01 | End: 2020-01-01 | Stop reason: HOSPADM

## 2020-01-01 RX ORDER — FERROUS SULFATE 325(65) MG
325 TABLET ORAL
COMMUNITY

## 2020-01-01 RX ORDER — METOPROLOL TARTRATE 50 MG/1
75 TABLET, FILM COATED ORAL 2 TIMES DAILY
Qty: 135 TABLET | Refills: 3 | Status: SHIPPED | OUTPATIENT
Start: 2020-01-01

## 2020-01-01 RX ORDER — EZETIMIBE 10 MG/1
10 TABLET ORAL DAILY
Status: DISCONTINUED | OUTPATIENT
Start: 2020-01-01 | End: 2020-01-01 | Stop reason: HOSPADM

## 2020-01-01 RX ORDER — SODIUM CHLORIDE 0.9 % (FLUSH) 0.9 %
10 SYRINGE (ML) INJECTION PRN
Status: DISCONTINUED | OUTPATIENT
Start: 2020-01-01 | End: 2020-01-01 | Stop reason: HOSPADM

## 2020-01-01 RX ORDER — SODIUM CHLORIDE 9 MG/ML
INJECTION, SOLUTION INTRAVENOUS CONTINUOUS
Status: DISCONTINUED | OUTPATIENT
Start: 2020-01-01 | End: 2020-01-01

## 2020-01-01 RX ORDER — ACETAMINOPHEN 80 MG
TABLET,CHEWABLE ORAL ONCE
Status: DISCONTINUED | OUTPATIENT
Start: 2020-01-01 | End: 2020-01-01 | Stop reason: HOSPADM

## 2020-01-01 RX ORDER — METHYLPREDNISOLONE SODIUM SUCCINATE 40 MG/ML
40 INJECTION, POWDER, LYOPHILIZED, FOR SOLUTION INTRAMUSCULAR; INTRAVENOUS EVERY 8 HOURS
Status: DISCONTINUED | OUTPATIENT
Start: 2020-01-01 | End: 2020-01-01 | Stop reason: HOSPADM

## 2020-01-01 RX ORDER — SUMATRIPTAN 6 MG/.5ML
6 INJECTION, SOLUTION SUBCUTANEOUS ONCE
Status: DISCONTINUED | OUTPATIENT
Start: 2020-01-01 | End: 2020-01-01 | Stop reason: HOSPADM

## 2020-01-01 RX ORDER — IPRATROPIUM BROMIDE AND ALBUTEROL SULFATE 2.5; .5 MG/3ML; MG/3ML
1 SOLUTION RESPIRATORY (INHALATION) ONCE
Status: COMPLETED | OUTPATIENT
Start: 2020-01-01 | End: 2020-01-01

## 2020-01-01 RX ORDER — ONDANSETRON 2 MG/ML
4 INJECTION INTRAMUSCULAR; INTRAVENOUS EVERY 6 HOURS PRN
Status: DISCONTINUED | OUTPATIENT
Start: 2020-01-01 | End: 2020-01-01 | Stop reason: HOSPADM

## 2020-01-01 RX ORDER — LISINOPRIL 10 MG/1
10 TABLET ORAL DAILY
Status: DISCONTINUED | OUTPATIENT
Start: 2020-01-01 | End: 2020-01-01 | Stop reason: HOSPADM

## 2020-01-01 RX ORDER — ACETAMINOPHEN 650 MG/1
650 SUPPOSITORY RECTAL EVERY 6 HOURS PRN
Status: DISCONTINUED | OUTPATIENT
Start: 2020-01-01 | End: 2020-01-01 | Stop reason: HOSPADM

## 2020-01-01 RX ORDER — GLUCOSAMINE/MSM/CHONDROITIN A 500-83-400
50 TABLET ORAL 2 TIMES DAILY
Status: DISCONTINUED | OUTPATIENT
Start: 2020-01-01 | End: 2020-01-01 | Stop reason: HOSPADM

## 2020-01-01 RX ORDER — ALBUTEROL SULFATE 90 UG/1
2 AEROSOL, METERED RESPIRATORY (INHALATION) EVERY 6 HOURS PRN
Status: DISCONTINUED | OUTPATIENT
Start: 2020-01-01 | End: 2020-01-01 | Stop reason: HOSPADM

## 2020-01-01 RX ORDER — IPRATROPIUM BROMIDE AND ALBUTEROL SULFATE 2.5; .5 MG/3ML; MG/3ML
1 SOLUTION RESPIRATORY (INHALATION) EVERY 4 HOURS PRN
Status: DISCONTINUED | OUTPATIENT
Start: 2020-01-01 | End: 2020-01-01 | Stop reason: HOSPADM

## 2020-01-01 RX ORDER — WARFARIN SODIUM 1 MG/1
0.5 TABLET ORAL
Status: COMPLETED | OUTPATIENT
Start: 2020-01-01 | End: 2020-01-01

## 2020-01-01 RX ORDER — POLYETHYLENE GLYCOL 3350 17 G/17G
17 POWDER, FOR SOLUTION ORAL DAILY PRN
Status: DISCONTINUED | OUTPATIENT
Start: 2020-01-01 | End: 2020-01-01 | Stop reason: HOSPADM

## 2020-01-01 RX ORDER — SODIUM CHLORIDE 0.9 % (FLUSH) 0.9 %
10 SYRINGE (ML) INJECTION EVERY 12 HOURS SCHEDULED
Status: DISCONTINUED | OUTPATIENT
Start: 2020-01-01 | End: 2020-01-01 | Stop reason: HOSPADM

## 2020-01-01 RX ORDER — ATORVASTATIN CALCIUM 80 MG/1
80 TABLET, FILM COATED ORAL DAILY
Qty: 30 TABLET | Refills: 3 | Status: SHIPPED | OUTPATIENT
Start: 2020-01-01

## 2020-01-01 RX ORDER — PREGABALIN 75 MG/1
75 CAPSULE ORAL 2 TIMES DAILY
COMMUNITY

## 2020-01-01 RX ORDER — METOPROLOL TARTRATE 50 MG/1
75 TABLET, FILM COATED ORAL 2 TIMES DAILY
Qty: 90 TABLET | Refills: 3 | Status: SHIPPED | OUTPATIENT
Start: 2020-01-01 | End: 2020-01-01 | Stop reason: SDUPTHER

## 2020-01-01 RX ORDER — PREGABALIN 75 MG/1
75 CAPSULE ORAL 2 TIMES DAILY
Status: DISCONTINUED | OUTPATIENT
Start: 2020-01-01 | End: 2020-01-01 | Stop reason: HOSPADM

## 2020-01-01 RX ORDER — WARFARIN SODIUM 2 MG/1
2 TABLET ORAL
Status: COMPLETED | OUTPATIENT
Start: 2020-01-01 | End: 2020-01-01

## 2020-01-01 RX ORDER — PANTOPRAZOLE SODIUM 40 MG/1
40 TABLET, DELAYED RELEASE ORAL DAILY
Status: DISCONTINUED | OUTPATIENT
Start: 2020-01-01 | End: 2020-01-01 | Stop reason: HOSPADM

## 2020-01-01 RX ORDER — NITROGLYCERIN 0.4 MG/1
0.4 TABLET SUBLINGUAL EVERY 5 MIN PRN
Status: DISCONTINUED | OUTPATIENT
Start: 2020-01-01 | End: 2020-01-01 | Stop reason: HOSPADM

## 2020-01-01 RX ORDER — SODIUM CHLORIDE, SODIUM LACTATE, POTASSIUM CHLORIDE, AND CALCIUM CHLORIDE .6; .31; .03; .02 G/100ML; G/100ML; G/100ML; G/100ML
500 INJECTION, SOLUTION INTRAVENOUS ONCE
Status: COMPLETED | OUTPATIENT
Start: 2020-01-01 | End: 2020-01-01

## 2020-01-01 RX ORDER — CLOPIDOGREL BISULFATE 75 MG/1
75 TABLET ORAL DAILY
Status: DISCONTINUED | OUTPATIENT
Start: 2020-01-01 | End: 2020-01-01 | Stop reason: HOSPADM

## 2020-01-01 RX ORDER — PREDNISONE 10 MG/1
5 TABLET ORAL DAILY
Status: DISCONTINUED | OUTPATIENT
Start: 2020-01-01 | End: 2020-01-01 | Stop reason: HOSPADM

## 2020-01-01 RX ORDER — WARFARIN SODIUM 5 MG/1
5 TABLET ORAL
Status: COMPLETED | OUTPATIENT
Start: 2020-01-01 | End: 2020-01-01

## 2020-01-01 RX ORDER — HYDRALAZINE HYDROCHLORIDE 20 MG/ML
5 INJECTION INTRAMUSCULAR; INTRAVENOUS EVERY 6 HOURS PRN
Status: DISCONTINUED | OUTPATIENT
Start: 2020-01-01 | End: 2020-01-01 | Stop reason: RX

## 2020-01-01 RX ORDER — ATORVASTATIN CALCIUM 40 MG/1
80 TABLET, FILM COATED ORAL NIGHTLY
Status: DISCONTINUED | OUTPATIENT
Start: 2020-01-01 | End: 2020-01-01 | Stop reason: HOSPADM

## 2020-01-01 RX ORDER — METOPROLOL TARTRATE 50 MG/1
100 TABLET, FILM COATED ORAL 2 TIMES DAILY
Status: DISCONTINUED | OUTPATIENT
Start: 2020-01-01 | End: 2020-01-01 | Stop reason: HOSPADM

## 2020-01-01 RX ORDER — LANOLIN ALCOHOL/MO/W.PET/CERES
400 CREAM (GRAM) TOPICAL 2 TIMES DAILY
Status: DISCONTINUED | OUTPATIENT
Start: 2020-01-01 | End: 2020-01-01 | Stop reason: HOSPADM

## 2020-01-01 RX ORDER — ASPIRIN 81 MG/1
81 TABLET, CHEWABLE ORAL DAILY
Status: DISCONTINUED | OUTPATIENT
Start: 2020-01-01 | End: 2020-01-01

## 2020-01-01 RX ORDER — PROMETHAZINE HYDROCHLORIDE 12.5 MG/1
12.5 TABLET ORAL EVERY 6 HOURS PRN
Status: DISCONTINUED | OUTPATIENT
Start: 2020-01-01 | End: 2020-01-01 | Stop reason: HOSPADM

## 2020-01-01 RX ORDER — DILTIAZEM HYDROCHLORIDE 60 MG/1
60 TABLET, FILM COATED ORAL EVERY 8 HOURS SCHEDULED
Status: DISCONTINUED | OUTPATIENT
Start: 2020-01-01 | End: 2020-01-01 | Stop reason: HOSPADM

## 2020-01-01 RX ORDER — FERROUS SULFATE 325(65) MG
325 TABLET ORAL
Status: DISCONTINUED | OUTPATIENT
Start: 2020-01-01 | End: 2020-01-01 | Stop reason: HOSPADM

## 2020-01-01 RX ORDER — GABAPENTIN 100 MG/1
100 CAPSULE ORAL 3 TIMES DAILY
Status: DISCONTINUED | OUTPATIENT
Start: 2020-01-01 | End: 2020-01-01 | Stop reason: HOSPADM

## 2020-01-01 RX ORDER — KETOROLAC TROMETHAMINE 30 MG/ML
30 INJECTION, SOLUTION INTRAMUSCULAR; INTRAVENOUS EVERY 6 HOURS PRN
Status: DISCONTINUED | OUTPATIENT
Start: 2020-01-01 | End: 2020-01-01 | Stop reason: HOSPADM

## 2020-01-01 RX ORDER — BACLOFEN 10 MG/1
10 TABLET ORAL 3 TIMES DAILY
COMMUNITY

## 2020-01-01 RX ORDER — WARFARIN SODIUM 5 MG/1
7.5 TABLET ORAL
Status: COMPLETED | OUTPATIENT
Start: 2020-01-01 | End: 2020-01-01

## 2020-01-01 RX ORDER — METOPROLOL TARTRATE 5 MG/5ML
5 INJECTION INTRAVENOUS EVERY 4 HOURS PRN
Status: DISCONTINUED | OUTPATIENT
Start: 2020-01-01 | End: 2020-01-01 | Stop reason: HOSPADM

## 2020-01-01 RX ORDER — LEFLUNOMIDE 20 MG/1
20 TABLET ORAL DAILY
Status: DISCONTINUED | OUTPATIENT
Start: 2020-01-01 | End: 2020-01-01 | Stop reason: HOSPADM

## 2020-01-01 RX ORDER — BACLOFEN 10 MG/1
10 TABLET ORAL 3 TIMES DAILY
Status: DISCONTINUED | OUTPATIENT
Start: 2020-01-01 | End: 2020-01-01 | Stop reason: HOSPADM

## 2020-01-01 RX ADMIN — EZETIMIBE 10 MG: 10 TABLET ORAL at 08:13

## 2020-01-01 RX ADMIN — PREGABALIN 75 MG: 50 CAPSULE ORAL at 21:34

## 2020-01-01 RX ADMIN — DOXYCYCLINE 100 MG: 100 INJECTION, POWDER, LYOPHILIZED, FOR SOLUTION INTRAVENOUS at 02:45

## 2020-01-01 RX ADMIN — FERROUS SULFATE TAB 325 MG (65 MG ELEMENTAL FE) 325 MG: 325 (65 FE) TAB at 10:27

## 2020-01-01 RX ADMIN — Medication 10 ML: at 21:03

## 2020-01-01 RX ADMIN — FUROSEMIDE 20 MG: 10 INJECTION, SOLUTION INTRAVENOUS at 10:44

## 2020-01-01 RX ADMIN — FERROUS SULFATE TAB 325 MG (65 MG ELEMENTAL FE) 325 MG: 325 (65 FE) TAB at 08:24

## 2020-01-01 RX ADMIN — ATORVASTATIN CALCIUM 80 MG: 40 TABLET, FILM COATED ORAL at 20:37

## 2020-01-01 RX ADMIN — DILTIAZEM HYDROCHLORIDE 60 MG: 60 TABLET, FILM COATED ORAL at 05:31

## 2020-01-01 RX ADMIN — METOPROLOL TARTRATE 75 MG: 25 TABLET, FILM COATED ORAL at 10:42

## 2020-01-01 RX ADMIN — CEFTRIAXONE SODIUM 1 G: 1 INJECTION, POWDER, FOR SOLUTION INTRAMUSCULAR; INTRAVENOUS at 21:55

## 2020-01-01 RX ADMIN — Medication 10 ML: at 21:06

## 2020-01-01 RX ADMIN — PREGABALIN 75 MG: 75 CAPSULE ORAL at 21:55

## 2020-01-01 RX ADMIN — PREDNISONE 5 MG: 10 TABLET ORAL at 09:32

## 2020-01-01 RX ADMIN — IOPAMIDOL 100 ML: 612 INJECTION, SOLUTION INTRAVENOUS at 14:41

## 2020-01-01 RX ADMIN — WARFARIN SODIUM 5 MG: 5 TABLET ORAL at 17:59

## 2020-01-01 RX ADMIN — METOPROLOL TARTRATE 100 MG: 50 TABLET, FILM COATED ORAL at 20:49

## 2020-01-01 RX ADMIN — DILTIAZEM HYDROCHLORIDE 30 MG: 30 TABLET, FILM COATED ORAL at 03:27

## 2020-01-01 RX ADMIN — Medication 400 MG: at 20:00

## 2020-01-01 RX ADMIN — GABAPENTIN 100 MG: 100 CAPSULE ORAL at 08:02

## 2020-01-01 RX ADMIN — BACLOFEN 10 MG: 10 TABLET ORAL at 21:49

## 2020-01-01 RX ADMIN — DILTIAZEM HYDROCHLORIDE 60 MG: 60 TABLET, FILM COATED ORAL at 05:43

## 2020-01-01 RX ADMIN — CEFTRIAXONE SODIUM 1 G: 1 INJECTION, POWDER, FOR SOLUTION INTRAMUSCULAR; INTRAVENOUS at 21:02

## 2020-01-01 RX ADMIN — SODIUM CHLORIDE, POTASSIUM CHLORIDE, SODIUM LACTATE AND CALCIUM CHLORIDE 500 ML: 600; 310; 30; 20 INJECTION, SOLUTION INTRAVENOUS at 20:51

## 2020-01-01 RX ADMIN — ENOXAPARIN SODIUM 90 MG: 100 INJECTION SUBCUTANEOUS at 21:07

## 2020-01-01 RX ADMIN — BACLOFEN 10 MG: 10 TABLET ORAL at 08:12

## 2020-01-01 RX ADMIN — DOXYCYCLINE 100 MG: 100 INJECTION, POWDER, LYOPHILIZED, FOR SOLUTION INTRAVENOUS at 05:43

## 2020-01-01 RX ADMIN — ACETAMINOPHEN 650 MG: 325 TABLET ORAL at 12:53

## 2020-01-01 RX ADMIN — DILTIAZEM HYDROCHLORIDE 60 MG: 60 TABLET, FILM COATED ORAL at 14:27

## 2020-01-01 RX ADMIN — METOPROLOL TARTRATE 100 MG: 50 TABLET, FILM COATED ORAL at 21:34

## 2020-01-01 RX ADMIN — LISINOPRIL 10 MG: 10 TABLET ORAL at 08:12

## 2020-01-01 RX ADMIN — EZETIMIBE 10 MG: 10 TABLET ORAL at 08:00

## 2020-01-01 RX ADMIN — METOPROLOL TARTRATE 5 MG: 1 INJECTION, SOLUTION INTRAVENOUS at 08:02

## 2020-01-01 RX ADMIN — FOLIC ACID 1 MG: 1 TABLET ORAL at 08:10

## 2020-01-01 RX ADMIN — DILTIAZEM HYDROCHLORIDE 60 MG: 60 TABLET, FILM COATED ORAL at 05:35

## 2020-01-01 RX ADMIN — LEFLUNOMIDE 20 MG: 20 TABLET ORAL at 08:09

## 2020-01-01 RX ADMIN — Medication 400 MG: at 21:15

## 2020-01-01 RX ADMIN — LISINOPRIL 10 MG: 10 TABLET ORAL at 08:11

## 2020-01-01 RX ADMIN — METOPROLOL TARTRATE 100 MG: 50 TABLET, FILM COATED ORAL at 09:52

## 2020-01-01 RX ADMIN — LISINOPRIL 10 MG: 10 TABLET ORAL at 10:43

## 2020-01-01 RX ADMIN — Medication 10 ML: at 21:00

## 2020-01-01 RX ADMIN — ATORVASTATIN CALCIUM 80 MG: 40 TABLET, FILM COATED ORAL at 21:35

## 2020-01-01 RX ADMIN — ACETAMINOPHEN, ASPIRIN AND CAFFEINE 1 TABLET: 250; 250; 65 TABLET, FILM COATED ORAL at 15:08

## 2020-01-01 RX ADMIN — Medication 10 ML: at 10:29

## 2020-01-01 RX ADMIN — GABAPENTIN 100 MG: 100 CAPSULE ORAL at 21:49

## 2020-01-01 RX ADMIN — CLOPIDOGREL BISULFATE 75 MG: 75 TABLET ORAL at 09:07

## 2020-01-01 RX ADMIN — PREGABALIN 75 MG: 75 CAPSULE ORAL at 20:37

## 2020-01-01 RX ADMIN — Medication 400 MG: at 21:02

## 2020-01-01 RX ADMIN — PANTOPRAZOLE SODIUM 40 MG: 40 TABLET, DELAYED RELEASE ORAL at 08:24

## 2020-01-01 RX ADMIN — GABAPENTIN 100 MG: 100 CAPSULE ORAL at 21:06

## 2020-01-01 RX ADMIN — BACLOFEN 10 MG: 10 TABLET ORAL at 08:10

## 2020-01-01 RX ADMIN — PREGABALIN 75 MG: 50 CAPSULE ORAL at 19:59

## 2020-01-01 RX ADMIN — FERROUS SULFATE TAB 325 MG (65 MG ELEMENTAL FE) 325 MG: 325 (65 FE) TAB at 09:09

## 2020-01-01 RX ADMIN — SODIUM CHLORIDE 200 MG: 9 INJECTION, SOLUTION INTRAVENOUS at 15:04

## 2020-01-01 RX ADMIN — ACETAMINOPHEN 650 MG: 325 TABLET ORAL at 21:06

## 2020-01-01 RX ADMIN — ACETAMINOPHEN 650 MG: 325 TABLET ORAL at 08:00

## 2020-01-01 RX ADMIN — IOVERSOL 40 ML: 678 INJECTION INTRA-ARTERIAL; INTRAVENOUS at 16:43

## 2020-01-01 RX ADMIN — METOPROLOL TARTRATE 100 MG: 50 TABLET, FILM COATED ORAL at 20:37

## 2020-01-01 RX ADMIN — BACLOFEN 10 MG: 10 TABLET ORAL at 15:31

## 2020-01-01 RX ADMIN — Medication 10 ML: at 20:00

## 2020-01-01 RX ADMIN — PREGABALIN 75 MG: 50 CAPSULE ORAL at 20:50

## 2020-01-01 RX ADMIN — WARFARIN SODIUM 7.5 MG: 5 TABLET ORAL at 19:59

## 2020-01-01 RX ADMIN — DILTIAZEM HYDROCHLORIDE 60 MG: 60 TABLET, FILM COATED ORAL at 21:34

## 2020-01-01 RX ADMIN — METOPROLOL TARTRATE 5 MG: 1 INJECTION, SOLUTION INTRAVENOUS at 05:38

## 2020-01-01 RX ADMIN — BACLOFEN 10 MG: 10 TABLET ORAL at 10:28

## 2020-01-01 RX ADMIN — GABAPENTIN 100 MG: 100 CAPSULE ORAL at 19:59

## 2020-01-01 RX ADMIN — ATORVASTATIN CALCIUM 80 MG: 40 TABLET, FILM COATED ORAL at 21:06

## 2020-01-01 RX ADMIN — ACETAMINOPHEN 650 MG: 325 TABLET ORAL at 21:03

## 2020-01-01 RX ADMIN — PANTOPRAZOLE SODIUM 40 MG: 40 TABLET, DELAYED RELEASE ORAL at 10:42

## 2020-01-01 RX ADMIN — DOXYCYCLINE 100 MG: 100 INJECTION, POWDER, LYOPHILIZED, FOR SOLUTION INTRAVENOUS at 05:37

## 2020-01-01 RX ADMIN — ENOXAPARIN SODIUM 90 MG: 100 INJECTION SUBCUTANEOUS at 20:00

## 2020-01-01 RX ADMIN — BACLOFEN 10 MG: 10 TABLET ORAL at 20:50

## 2020-01-01 RX ADMIN — ENOXAPARIN SODIUM 90 MG: 100 INJECTION SUBCUTANEOUS at 08:09

## 2020-01-01 RX ADMIN — PREGABALIN 75 MG: 50 CAPSULE ORAL at 21:06

## 2020-01-01 RX ADMIN — ATORVASTATIN CALCIUM 80 MG: 40 TABLET, FILM COATED ORAL at 21:02

## 2020-01-01 RX ADMIN — ENOXAPARIN SODIUM 90 MG: 100 INJECTION SUBCUTANEOUS at 21:49

## 2020-01-01 RX ADMIN — METHYLPREDNISOLONE SODIUM SUCCINATE 40 MG: 40 INJECTION, POWDER, FOR SOLUTION INTRAMUSCULAR; INTRAVENOUS at 22:49

## 2020-01-01 RX ADMIN — METOPROLOL TARTRATE 100 MG: 50 TABLET, FILM COATED ORAL at 21:02

## 2020-01-01 RX ADMIN — METOPROLOL TARTRATE 5 MG: 1 INJECTION, SOLUTION INTRAVENOUS at 05:09

## 2020-01-01 RX ADMIN — EZETIMIBE 10 MG: 10 TABLET ORAL at 10:28

## 2020-01-01 RX ADMIN — METOPROLOL TARTRATE 100 MG: 50 TABLET, FILM COATED ORAL at 21:48

## 2020-01-01 RX ADMIN — Medication 10 ML: at 21:50

## 2020-01-01 RX ADMIN — Medication 400 MG: at 09:52

## 2020-01-01 RX ADMIN — GABAPENTIN 100 MG: 100 CAPSULE ORAL at 20:37

## 2020-01-01 RX ADMIN — Medication 400 MG: at 21:54

## 2020-01-01 RX ADMIN — PREGABALIN 75 MG: 50 CAPSULE ORAL at 08:11

## 2020-01-01 RX ADMIN — ACETAMINOPHEN 650 MG: 325 TABLET ORAL at 05:42

## 2020-01-01 RX ADMIN — SODIUM CHLORIDE: 9 INJECTION, SOLUTION INTRAVENOUS at 19:29

## 2020-01-01 RX ADMIN — Medication 400 MG: at 21:48

## 2020-01-01 RX ADMIN — Medication 10 ML: at 08:10

## 2020-01-01 RX ADMIN — BACLOFEN 10 MG: 10 TABLET ORAL at 08:24

## 2020-01-01 RX ADMIN — BACLOFEN 10 MG: 10 TABLET ORAL at 21:15

## 2020-01-01 RX ADMIN — LISINOPRIL 10 MG: 10 TABLET ORAL at 08:01

## 2020-01-01 RX ADMIN — EZETIMIBE 10 MG: 10 TABLET ORAL at 10:43

## 2020-01-01 RX ADMIN — GABAPENTIN 100 MG: 100 CAPSULE ORAL at 21:15

## 2020-01-01 RX ADMIN — METHYLPREDNISOLONE SODIUM SUCCINATE 40 MG: 40 INJECTION, POWDER, FOR SOLUTION INTRAMUSCULAR; INTRAVENOUS at 03:53

## 2020-01-01 RX ADMIN — FOLIC ACID 1 MG: 1 TABLET ORAL at 10:42

## 2020-01-01 RX ADMIN — EZETIMIBE 10 MG: 10 TABLET ORAL at 08:11

## 2020-01-01 RX ADMIN — PREGABALIN 75 MG: 50 CAPSULE ORAL at 10:01

## 2020-01-01 RX ADMIN — WARFARIN SODIUM 0.5 MG: 1 TABLET ORAL at 15:10

## 2020-01-01 RX ADMIN — PANTOPRAZOLE SODIUM 40 MG: 40 TABLET, DELAYED RELEASE ORAL at 08:10

## 2020-01-01 RX ADMIN — DILTIAZEM HYDROCHLORIDE 30 MG: 30 TABLET, FILM COATED ORAL at 08:11

## 2020-01-01 RX ADMIN — METOPROLOL TARTRATE 100 MG: 50 TABLET, FILM COATED ORAL at 08:12

## 2020-01-01 RX ADMIN — CLOPIDOGREL BISULFATE 75 MG: 75 TABLET ORAL at 09:32

## 2020-01-01 RX ADMIN — PREDNISONE 5 MG: 10 TABLET ORAL at 08:01

## 2020-01-01 RX ADMIN — PANTOPRAZOLE SODIUM 40 MG: 40 TABLET, DELAYED RELEASE ORAL at 09:32

## 2020-01-01 RX ADMIN — ASPIRIN 81 MG: 81 TABLET, CHEWABLE ORAL at 08:01

## 2020-01-01 RX ADMIN — DILTIAZEM HYDROCHLORIDE 60 MG: 60 TABLET, FILM COATED ORAL at 21:15

## 2020-01-01 RX ADMIN — GABAPENTIN 100 MG: 100 CAPSULE ORAL at 13:01

## 2020-01-01 RX ADMIN — Medication 400 MG: at 21:34

## 2020-01-01 RX ADMIN — PREGABALIN 75 MG: 50 CAPSULE ORAL at 08:24

## 2020-01-01 RX ADMIN — DILTIAZEM HYDROCHLORIDE 60 MG: 60 TABLET, FILM COATED ORAL at 05:38

## 2020-01-01 RX ADMIN — GABAPENTIN 100 MG: 100 CAPSULE ORAL at 14:53

## 2020-01-01 RX ADMIN — CLOPIDOGREL BISULFATE 75 MG: 75 TABLET ORAL at 08:12

## 2020-01-01 RX ADMIN — GABAPENTIN 100 MG: 100 CAPSULE ORAL at 14:27

## 2020-01-01 RX ADMIN — GABAPENTIN 100 MG: 100 CAPSULE ORAL at 20:50

## 2020-01-01 RX ADMIN — PREGABALIN 75 MG: 75 CAPSULE ORAL at 09:07

## 2020-01-01 RX ADMIN — BACLOFEN 10 MG: 10 TABLET ORAL at 14:27

## 2020-01-01 RX ADMIN — BACLOFEN 10 MG: 10 TABLET ORAL at 21:02

## 2020-01-01 RX ADMIN — SODIUM CHLORIDE: 9 INJECTION, SOLUTION INTRAVENOUS at 13:12

## 2020-01-01 RX ADMIN — METOPROLOL TARTRATE 5 MG: 1 INJECTION, SOLUTION INTRAVENOUS at 15:24

## 2020-01-01 RX ADMIN — METHYLPREDNISOLONE SODIUM SUCCINATE 40 MG: 40 INJECTION, POWDER, FOR SOLUTION INTRAMUSCULAR; INTRAVENOUS at 05:54

## 2020-01-01 RX ADMIN — Medication 10 ML: at 21:16

## 2020-01-01 RX ADMIN — METHYLPREDNISOLONE SODIUM SUCCINATE 40 MG: 40 INJECTION, POWDER, FOR SOLUTION INTRAMUSCULAR; INTRAVENOUS at 13:34

## 2020-01-01 RX ADMIN — FOLIC ACID 1 MG: 1 TABLET ORAL at 09:07

## 2020-01-01 RX ADMIN — ACETAMINOPHEN 650 MG: 325 TABLET ORAL at 08:28

## 2020-01-01 RX ADMIN — CLOPIDOGREL BISULFATE 75 MG: 75 TABLET ORAL at 08:24

## 2020-01-01 RX ADMIN — BACLOFEN 10 MG: 10 TABLET ORAL at 21:55

## 2020-01-01 RX ADMIN — FERROUS SULFATE TAB 325 MG (65 MG ELEMENTAL FE) 325 MG: 325 (65 FE) TAB at 08:10

## 2020-01-01 RX ADMIN — PANTOPRAZOLE SODIUM 40 MG: 40 TABLET, DELAYED RELEASE ORAL at 09:07

## 2020-01-01 RX ADMIN — SODIUM CHLORIDE 200 MG: 9 INJECTION, SOLUTION INTRAVENOUS at 13:25

## 2020-01-01 RX ADMIN — DILTIAZEM HYDROCHLORIDE 60 MG: 60 TABLET, FILM COATED ORAL at 21:03

## 2020-01-01 RX ADMIN — BACLOFEN 10 MG: 10 TABLET ORAL at 21:06

## 2020-01-01 RX ADMIN — DILTIAZEM HYDROCHLORIDE 30 MG: 30 TABLET, FILM COATED ORAL at 03:32

## 2020-01-01 RX ADMIN — METOPROLOL TARTRATE 5 MG: 1 INJECTION, SOLUTION INTRAVENOUS at 19:32

## 2020-01-01 RX ADMIN — ATORVASTATIN CALCIUM 80 MG: 40 TABLET, FILM COATED ORAL at 21:48

## 2020-01-01 RX ADMIN — Medication 10 ML: at 09:08

## 2020-01-01 RX ADMIN — DILTIAZEM HYDROCHLORIDE 60 MG: 60 TABLET, FILM COATED ORAL at 21:49

## 2020-01-01 RX ADMIN — Medication 400 MG: at 08:12

## 2020-01-01 RX ADMIN — BACLOFEN 10 MG: 10 TABLET ORAL at 09:32

## 2020-01-01 RX ADMIN — ACETAMINOPHEN 650 MG: 325 TABLET ORAL at 08:25

## 2020-01-01 RX ADMIN — GABAPENTIN 100 MG: 100 CAPSULE ORAL at 08:24

## 2020-01-01 RX ADMIN — Medication 10 ML: at 21:35

## 2020-01-01 RX ADMIN — DILTIAZEM HYDROCHLORIDE 60 MG: 60 TABLET, FILM COATED ORAL at 05:42

## 2020-01-01 RX ADMIN — DILTIAZEM HYDROCHLORIDE 60 MG: 60 TABLET, FILM COATED ORAL at 21:55

## 2020-01-01 RX ADMIN — ACETAMINOPHEN 650 MG: 325 TABLET ORAL at 20:05

## 2020-01-01 RX ADMIN — PREGABALIN 75 MG: 50 CAPSULE ORAL at 09:33

## 2020-01-01 RX ADMIN — BACLOFEN 10 MG: 10 TABLET ORAL at 09:07

## 2020-01-01 RX ADMIN — CEFTRIAXONE SODIUM 1 G: 1 INJECTION, POWDER, FOR SOLUTION INTRAMUSCULAR; INTRAVENOUS at 21:07

## 2020-01-01 RX ADMIN — DILTIAZEM HYDROCHLORIDE 60 MG: 60 TABLET, FILM COATED ORAL at 14:58

## 2020-01-01 RX ADMIN — BACLOFEN 10 MG: 10 TABLET ORAL at 15:09

## 2020-01-01 RX ADMIN — Medication 400 MG: at 08:10

## 2020-01-01 RX ADMIN — Medication 400 MG: at 08:24

## 2020-01-01 RX ADMIN — PREDNISONE 5 MG: 10 TABLET ORAL at 08:24

## 2020-01-01 RX ADMIN — Medication 10 ML: at 20:50

## 2020-01-01 RX ADMIN — METOPROLOL TARTRATE 100 MG: 50 TABLET, FILM COATED ORAL at 21:06

## 2020-01-01 RX ADMIN — DOXYCYCLINE 100 MG: 100 INJECTION, POWDER, LYOPHILIZED, FOR SOLUTION INTRAVENOUS at 14:41

## 2020-01-01 RX ADMIN — EZETIMIBE 10 MG: 10 TABLET ORAL at 10:01

## 2020-01-01 RX ADMIN — Medication 10 ML: at 08:23

## 2020-01-01 RX ADMIN — GABAPENTIN 100 MG: 100 CAPSULE ORAL at 21:03

## 2020-01-01 RX ADMIN — BACLOFEN 10 MG: 10 TABLET ORAL at 10:42

## 2020-01-01 RX ADMIN — BACLOFEN 10 MG: 10 TABLET ORAL at 14:53

## 2020-01-01 RX ADMIN — CEFTRIAXONE SODIUM 1 G: 1 INJECTION, POWDER, FOR SOLUTION INTRAMUSCULAR; INTRAVENOUS at 21:14

## 2020-01-01 RX ADMIN — GABAPENTIN 100 MG: 100 CAPSULE ORAL at 08:12

## 2020-01-01 RX ADMIN — BACLOFEN 10 MG: 10 TABLET ORAL at 19:59

## 2020-01-01 RX ADMIN — EZETIMIBE 10 MG: 10 TABLET ORAL at 09:33

## 2020-01-01 RX ADMIN — ACETAMINOPHEN 650 MG: 325 TABLET ORAL at 06:21

## 2020-01-01 RX ADMIN — GABAPENTIN 100 MG: 100 CAPSULE ORAL at 13:30

## 2020-01-01 RX ADMIN — CEFTRIAXONE SODIUM 1 G: 1 INJECTION, POWDER, FOR SOLUTION INTRAMUSCULAR; INTRAVENOUS at 21:33

## 2020-01-01 RX ADMIN — METOPROLOL TARTRATE 75 MG: 25 TABLET, FILM COATED ORAL at 08:23

## 2020-01-01 RX ADMIN — METHYLPREDNISOLONE SODIUM SUCCINATE 40 MG: 40 INJECTION, POWDER, FOR SOLUTION INTRAMUSCULAR; INTRAVENOUS at 20:37

## 2020-01-01 RX ADMIN — FERROUS SULFATE TAB 325 MG (65 MG ELEMENTAL FE) 325 MG: 325 (65 FE) TAB at 10:42

## 2020-01-01 RX ADMIN — GABAPENTIN 100 MG: 100 CAPSULE ORAL at 10:27

## 2020-01-01 RX ADMIN — FERROUS SULFATE TAB 325 MG (65 MG ELEMENTAL FE) 325 MG: 325 (65 FE) TAB at 08:12

## 2020-01-01 RX ADMIN — BACLOFEN 10 MG: 10 TABLET ORAL at 21:34

## 2020-01-01 RX ADMIN — IPRATROPIUM BROMIDE AND ALBUTEROL SULFATE 1 AMPULE: .5; 3 SOLUTION RESPIRATORY (INHALATION) at 13:41

## 2020-01-01 RX ADMIN — PREDNISONE 5 MG: 10 TABLET ORAL at 09:52

## 2020-01-01 RX ADMIN — Medication 400 MG: at 09:32

## 2020-01-01 RX ADMIN — FOLIC ACID 1 MG: 1 TABLET ORAL at 10:27

## 2020-01-01 RX ADMIN — Medication 10 ML: at 20:37

## 2020-01-01 RX ADMIN — METOPROLOL TARTRATE 100 MG: 50 TABLET, FILM COATED ORAL at 09:07

## 2020-01-01 RX ADMIN — LISINOPRIL 10 MG: 10 TABLET ORAL at 14:27

## 2020-01-01 RX ADMIN — Medication 400 MG: at 21:06

## 2020-01-01 RX ADMIN — PREGABALIN 75 MG: 50 CAPSULE ORAL at 21:15

## 2020-01-01 RX ADMIN — ACETAMINOPHEN 650 MG: 325 TABLET ORAL at 00:51

## 2020-01-01 RX ADMIN — ATORVASTATIN CALCIUM 80 MG: 40 TABLET, FILM COATED ORAL at 20:50

## 2020-01-01 RX ADMIN — GABAPENTIN 100 MG: 100 CAPSULE ORAL at 08:11

## 2020-01-01 RX ADMIN — SODIUM CHLORIDE: 9 INJECTION, SOLUTION INTRAVENOUS at 06:22

## 2020-01-01 RX ADMIN — PREDNISONE 5 MG: 10 TABLET ORAL at 08:12

## 2020-01-01 RX ADMIN — ENOXAPARIN SODIUM 90 MG: 100 INJECTION SUBCUTANEOUS at 08:22

## 2020-01-01 RX ADMIN — DILTIAZEM HYDROCHLORIDE 60 MG: 60 TABLET, FILM COATED ORAL at 20:49

## 2020-01-01 RX ADMIN — BACLOFEN 10 MG: 10 TABLET ORAL at 09:52

## 2020-01-01 RX ADMIN — PREGABALIN 75 MG: 50 CAPSULE ORAL at 08:00

## 2020-01-01 RX ADMIN — ATORVASTATIN CALCIUM 80 MG: 40 TABLET, FILM COATED ORAL at 19:59

## 2020-01-01 RX ADMIN — PANTOPRAZOLE SODIUM 40 MG: 40 TABLET, DELAYED RELEASE ORAL at 10:28

## 2020-01-01 RX ADMIN — BACLOFEN 10 MG: 10 TABLET ORAL at 14:58

## 2020-01-01 RX ADMIN — PREGABALIN 75 MG: 75 CAPSULE ORAL at 10:28

## 2020-01-01 RX ADMIN — GABAPENTIN 100 MG: 100 CAPSULE ORAL at 14:50

## 2020-01-01 RX ADMIN — METOPROLOL TARTRATE 100 MG: 50 TABLET, FILM COATED ORAL at 10:27

## 2020-01-01 RX ADMIN — GABAPENTIN 100 MG: 100 CAPSULE ORAL at 21:55

## 2020-01-01 RX ADMIN — ATORVASTATIN CALCIUM 80 MG: 40 TABLET, FILM COATED ORAL at 21:54

## 2020-01-01 RX ADMIN — CLOPIDOGREL BISULFATE 75 MG: 75 TABLET ORAL at 10:42

## 2020-01-01 RX ADMIN — DOXYCYCLINE 100 MG: 100 INJECTION, POWDER, LYOPHILIZED, FOR SOLUTION INTRAVENOUS at 01:55

## 2020-01-01 RX ADMIN — DILTIAZEM HYDROCHLORIDE 60 MG: 60 TABLET, FILM COATED ORAL at 04:59

## 2020-01-01 RX ADMIN — FOLIC ACID 1 MG: 1 TABLET ORAL at 09:33

## 2020-01-01 RX ADMIN — METHYLPREDNISOLONE SODIUM SUCCINATE 40 MG: 40 INJECTION, POWDER, FOR SOLUTION INTRAMUSCULAR; INTRAVENOUS at 21:49

## 2020-01-01 RX ADMIN — METHYLPREDNISOLONE SODIUM SUCCINATE 40 MG: 40 INJECTION, POWDER, FOR SOLUTION INTRAMUSCULAR; INTRAVENOUS at 14:41

## 2020-01-01 RX ADMIN — DILTIAZEM HYDROCHLORIDE 60 MG: 60 TABLET, FILM COATED ORAL at 13:31

## 2020-01-01 RX ADMIN — DOXYCYCLINE 100 MG: 100 INJECTION, POWDER, LYOPHILIZED, FOR SOLUTION INTRAVENOUS at 15:21

## 2020-01-01 RX ADMIN — METOPROLOL TARTRATE 75 MG: 25 TABLET, FILM COATED ORAL at 19:15

## 2020-01-01 RX ADMIN — CLOPIDOGREL BISULFATE 75 MG: 75 TABLET ORAL at 10:27

## 2020-01-01 RX ADMIN — ASPIRIN 81 MG: 81 TABLET, CHEWABLE ORAL at 08:23

## 2020-01-01 RX ADMIN — DOXYCYCLINE 100 MG: 100 INJECTION, POWDER, LYOPHILIZED, FOR SOLUTION INTRAVENOUS at 17:21

## 2020-01-01 RX ADMIN — ACETAMINOPHEN 650 MG: 325 TABLET ORAL at 14:59

## 2020-01-01 RX ADMIN — DILTIAZEM HYDROCHLORIDE 60 MG: 60 TABLET, FILM COATED ORAL at 20:37

## 2020-01-01 RX ADMIN — GABAPENTIN 100 MG: 100 CAPSULE ORAL at 09:07

## 2020-01-01 RX ADMIN — ATORVASTATIN CALCIUM 80 MG: 40 TABLET, FILM COATED ORAL at 21:15

## 2020-01-01 RX ADMIN — FOLIC ACID 1 MG: 1 TABLET ORAL at 08:01

## 2020-01-01 RX ADMIN — LEFLUNOMIDE 20 MG: 20 TABLET ORAL at 12:47

## 2020-01-01 RX ADMIN — FERROUS SULFATE TAB 325 MG (65 MG ELEMENTAL FE) 325 MG: 325 (65 FE) TAB at 08:01

## 2020-01-01 RX ADMIN — PHYTONADIONE 10 MG: 10 INJECTION, EMULSION INTRAMUSCULAR; INTRAVENOUS; SUBCUTANEOUS at 12:47

## 2020-01-01 RX ADMIN — METOPROLOL TARTRATE 100 MG: 50 TABLET, FILM COATED ORAL at 08:01

## 2020-01-01 RX ADMIN — LISINOPRIL 10 MG: 10 TABLET ORAL at 09:32

## 2020-01-01 RX ADMIN — DILTIAZEM HYDROCHLORIDE 60 MG: 60 TABLET, FILM COATED ORAL at 15:31

## 2020-01-01 RX ADMIN — METHYLPREDNISOLONE SODIUM SUCCINATE 40 MG: 40 INJECTION, POWDER, FOR SOLUTION INTRAMUSCULAR; INTRAVENOUS at 04:16

## 2020-01-01 RX ADMIN — BACLOFEN 10 MG: 10 TABLET ORAL at 13:01

## 2020-01-01 RX ADMIN — CEFTRIAXONE SODIUM 1 G: 1 INJECTION, POWDER, FOR SOLUTION INTRAMUSCULAR; INTRAVENOUS at 20:36

## 2020-01-01 RX ADMIN — WARFARIN SODIUM 2 MG: 2 TABLET ORAL at 17:53

## 2020-01-01 RX ADMIN — PREGABALIN 75 MG: 50 CAPSULE ORAL at 21:03

## 2020-01-01 RX ADMIN — PREDNISONE 5 MG: 10 TABLET ORAL at 08:10

## 2020-01-01 RX ADMIN — KETOROLAC TROMETHAMINE 30 MG: 30 INJECTION, SOLUTION INTRAMUSCULAR; INTRAVENOUS at 09:59

## 2020-01-01 RX ADMIN — GABAPENTIN 100 MG: 100 CAPSULE ORAL at 21:34

## 2020-01-01 RX ADMIN — FOLIC ACID 1 MG: 1 TABLET ORAL at 08:24

## 2020-01-01 RX ADMIN — Medication 10 ML: at 09:52

## 2020-01-01 RX ADMIN — Medication 10 ML: at 08:13

## 2020-01-01 RX ADMIN — METOPROLOL TARTRATE 5 MG: 1 INJECTION, SOLUTION INTRAVENOUS at 05:42

## 2020-01-01 RX ADMIN — Medication 400 MG: at 10:42

## 2020-01-01 RX ADMIN — FOLIC ACID 1 MG: 1 TABLET ORAL at 08:13

## 2020-01-01 RX ADMIN — BACLOFEN 10 MG: 10 TABLET ORAL at 20:37

## 2020-01-01 RX ADMIN — PANTOPRAZOLE SODIUM 40 MG: 40 TABLET, DELAYED RELEASE ORAL at 08:12

## 2020-01-01 RX ADMIN — LEFLUNOMIDE 20 MG: 20 TABLET ORAL at 08:33

## 2020-01-01 RX ADMIN — LISINOPRIL 10 MG: 10 TABLET ORAL at 08:24

## 2020-01-01 RX ADMIN — Medication 400 MG: at 20:50

## 2020-01-01 RX ADMIN — PANTOPRAZOLE SODIUM 40 MG: 40 TABLET, DELAYED RELEASE ORAL at 08:01

## 2020-01-01 RX ADMIN — ACETAMINOPHEN 650 MG: 325 TABLET ORAL at 03:27

## 2020-01-01 RX ADMIN — GABAPENTIN 100 MG: 100 CAPSULE ORAL at 10:43

## 2020-01-01 RX ADMIN — METOPROLOL TARTRATE 100 MG: 50 TABLET, FILM COATED ORAL at 08:11

## 2020-01-01 RX ADMIN — GABAPENTIN 100 MG: 100 CAPSULE ORAL at 09:32

## 2020-01-01 RX ADMIN — Medication 10 ML: at 21:25

## 2020-01-01 RX ADMIN — EZETIMIBE 10 MG: 10 TABLET ORAL at 08:23

## 2020-01-01 RX ADMIN — FOLIC ACID 1 MG: 1 TABLET ORAL at 09:52

## 2020-01-01 RX ADMIN — SODIUM CHLORIDE 200 MG: 9 INJECTION, SOLUTION INTRAVENOUS at 18:43

## 2020-01-01 RX ADMIN — CLOPIDOGREL BISULFATE 75 MG: 75 TABLET ORAL at 08:10

## 2020-01-01 RX ADMIN — Medication 10 ML: at 21:36

## 2020-01-01 RX ADMIN — PANTOPRAZOLE SODIUM 40 MG: 40 TABLET, DELAYED RELEASE ORAL at 09:52

## 2020-01-01 RX ADMIN — CLOPIDOGREL BISULFATE 75 MG: 75 TABLET ORAL at 09:52

## 2020-01-01 RX ADMIN — DILTIAZEM HYDROCHLORIDE 30 MG: 30 TABLET, FILM COATED ORAL at 17:59

## 2020-01-01 RX ADMIN — BACLOFEN 10 MG: 10 TABLET ORAL at 13:30

## 2020-01-01 RX ADMIN — GABAPENTIN 100 MG: 100 CAPSULE ORAL at 15:09

## 2020-01-01 RX ADMIN — CEFTRIAXONE SODIUM 1 G: 1 INJECTION, POWDER, FOR SOLUTION INTRAMUSCULAR; INTRAVENOUS at 20:49

## 2020-01-01 RX ADMIN — METOPROLOL TARTRATE 100 MG: 50 TABLET, FILM COATED ORAL at 21:15

## 2020-01-01 RX ADMIN — GABAPENTIN 100 MG: 100 CAPSULE ORAL at 09:52

## 2020-01-01 RX ADMIN — BACLOFEN 10 MG: 10 TABLET ORAL at 08:25

## 2020-01-01 RX ADMIN — METOPROLOL TARTRATE 100 MG: 50 TABLET, FILM COATED ORAL at 09:32

## 2020-01-01 RX ADMIN — PREGABALIN 75 MG: 50 CAPSULE ORAL at 10:42

## 2020-01-01 RX ADMIN — Medication 400 MG: at 08:01

## 2020-01-01 RX ADMIN — PREDNISONE 5 MG: 10 TABLET ORAL at 10:43

## 2020-01-01 RX ADMIN — CLOPIDOGREL BISULFATE 75 MG: 75 TABLET ORAL at 08:01

## 2020-01-01 RX ADMIN — FERROUS SULFATE TAB 325 MG (65 MG ELEMENTAL FE) 325 MG: 325 (65 FE) TAB at 09:52

## 2020-01-01 RX ADMIN — EZETIMIBE 10 MG: 10 TABLET ORAL at 09:07

## 2020-01-01 RX ADMIN — GABAPENTIN 100 MG: 100 CAPSULE ORAL at 14:58

## 2020-01-01 RX ADMIN — DILTIAZEM HYDROCHLORIDE 30 MG: 30 TABLET, FILM COATED ORAL at 14:26

## 2020-01-01 RX ADMIN — METHYLPREDNISOLONE SODIUM SUCCINATE 40 MG: 40 INJECTION, POWDER, FOR SOLUTION INTRAMUSCULAR; INTRAVENOUS at 12:01

## 2020-01-01 RX ADMIN — ENOXAPARIN SODIUM 90 MG: 100 INJECTION SUBCUTANEOUS at 05:35

## 2020-01-01 RX ADMIN — WARFARIN SODIUM 5 MG: 5 TABLET ORAL at 16:37

## 2020-01-01 RX ADMIN — FERROUS SULFATE TAB 325 MG (65 MG ELEMENTAL FE) 325 MG: 325 (65 FE) TAB at 09:33

## 2020-01-01 RX ADMIN — PREGABALIN 75 MG: 75 CAPSULE ORAL at 21:49

## 2020-01-01 ASSESSMENT — ENCOUNTER SYMPTOMS
VOMITING: 0
TROUBLE SWALLOWING: 0
COLOR CHANGE: 0
VOMITING: 0
ABDOMINAL DISTENTION: 0
RESPIRATORY NEGATIVE: 1
ABDOMINAL DISTENTION: 0
COLOR CHANGE: 0
NAUSEA: 0
NAUSEA: 0
GASTROINTESTINAL NEGATIVE: 1
CHEST TIGHTNESS: 0
BLOOD IN STOOL: 0
WHEEZING: 0
ANAL BLEEDING: 0
BLOOD IN STOOL: 0
SHORTNESS OF BREATH: 1
COUGH: 1
COUGH: 0
VOICE CHANGE: 0
CHEST TIGHTNESS: 0
COUGH: 0
VOICE CHANGE: 0
EYES NEGATIVE: 1
STRIDOR: 0
SHORTNESS OF BREATH: 0
ABDOMINAL PAIN: 0
ABDOMINAL DISTENTION: 0
NAUSEA: 0
COLOR CHANGE: 0
COUGH: 1
VOMITING: 0
ANAL BLEEDING: 0
NAUSEA: 0
COLOR CHANGE: 0
SHORTNESS OF BREATH: 1
ALLERGIC/IMMUNOLOGIC NEGATIVE: 1
EYES NEGATIVE: 1
ANAL BLEEDING: 0
VOMITING: 0
COLOR CHANGE: 0
RESPIRATORY NEGATIVE: 1
EYES NEGATIVE: 1
NAUSEA: 0
SHORTNESS OF BREATH: 0
COUGH: 1
COUGH: 0
ABDOMINAL DISTENTION: 0
NAUSEA: 0
CHEST TIGHTNESS: 0
GASTROINTESTINAL NEGATIVE: 1
WHEEZING: 0
COLOR CHANGE: 0
GASTROINTESTINAL NEGATIVE: 1
TROUBLE SWALLOWING: 0
APNEA: 0
GASTROINTESTINAL NEGATIVE: 1
COUGH: 1
NAUSEA: 0
ABDOMINAL PAIN: 0
GASTROINTESTINAL NEGATIVE: 1
SHORTNESS OF BREATH: 1
CHEST TIGHTNESS: 0
EYES NEGATIVE: 1
COUGH: 1
BLOOD IN STOOL: 0
GASTROINTESTINAL NEGATIVE: 1
APNEA: 0
CHEST TIGHTNESS: 0
SHORTNESS OF BREATH: 1
WHEEZING: 0
WHEEZING: 0
SHORTNESS OF BREATH: 0
FACIAL SWELLING: 0
FACIAL SWELLING: 0
CHEST TIGHTNESS: 0
SHORTNESS OF BREATH: 0
GASTROINTESTINAL NEGATIVE: 1
APNEA: 0
ABDOMINAL PAIN: 0
VOICE CHANGE: 0
VOMITING: 0
APNEA: 0
CHEST TIGHTNESS: 0
DIARRHEA: 0
VOMITING: 0
GASTROINTESTINAL NEGATIVE: 1
FACIAL SWELLING: 0
TROUBLE SWALLOWING: 0
BLOOD IN STOOL: 0
COLOR CHANGE: 0
NAUSEA: 0
SHORTNESS OF BREATH: 0
VOMITING: 0
SHORTNESS OF BREATH: 1
COUGH: 1
CHEST TIGHTNESS: 0
SHORTNESS OF BREATH: 1

## 2020-01-01 ASSESSMENT — PAIN SCALES - GENERAL
PAINLEVEL_OUTOF10: 2
PAINLEVEL_OUTOF10: 0
PAINLEVEL_OUTOF10: 4
PAINLEVEL_OUTOF10: 0
PAINLEVEL_OUTOF10: 7
PAINLEVEL_OUTOF10: 0
PAINLEVEL_OUTOF10: 3
PAINLEVEL_OUTOF10: 4
PAINLEVEL_OUTOF10: 0
PAINLEVEL_OUTOF10: 3
PAINLEVEL_OUTOF10: 0
PAINLEVEL_OUTOF10: 8
PAINLEVEL_OUTOF10: 0
PAINLEVEL_OUTOF10: 4
PAINLEVEL_OUTOF10: 0
PAINLEVEL_OUTOF10: 0
PAINLEVEL_OUTOF10: 4
PAINLEVEL_OUTOF10: 0
PAINLEVEL_OUTOF10: 3
PAINLEVEL_OUTOF10: 0

## 2020-01-01 ASSESSMENT — PAIN DESCRIPTION - LOCATION
LOCATION: HEAD
LOCATION: GENERALIZED
LOCATION: HEAD

## 2020-01-01 ASSESSMENT — PAIN DESCRIPTION - PAIN TYPE
TYPE: ACUTE PAIN
TYPE: ACUTE PAIN

## 2020-01-01 ASSESSMENT — PAIN DESCRIPTION - DESCRIPTORS
DESCRIPTORS: HEADACHE
DESCRIPTORS: ACHING
DESCRIPTORS: HEADACHE

## 2020-01-08 NOTE — PROGRESS NOTES
normal, normal heart sounds and intact distal pulses. Exam reveals no gallop. No murmur heard. Pulses:       Radial pulses are 2+ on the right side. Dorsalis pedis pulses are 2+ on the right side. Pulmonary/Chest: Effort normal and breath sounds normal. No wheezes. No rales. No tenderness. Abdominal: Soft. Bowel sounds are normal.   Musculoskeletal: Normal range of motion. No edema. Neurological: The patient is alert and oriented to person, place, and time. Intact cranial nerves. Skin: Skin is warm and dry. No rash noted.        LABS:  CBC:   Lab Results   Component Value Date    WBC 7.3 09/17/2019    RBC 3.83 09/17/2019    RBC 4.38 04/26/2012    HGB 12.1 09/17/2019    HCT 37.1 09/17/2019    MCV 96.7 09/17/2019    MCH 31.5 09/17/2019    MCHC 32.5 09/17/2019    RDW 25.2 09/17/2019     09/17/2019    MPV 8.3 04/26/2012     Lipids:  Lab Results   Component Value Date    CHOL 244 (H) 06/20/2013     Lab Results   Component Value Date    TRIG 86 06/20/2013     Lab Results   Component Value Date    HDL 59 06/20/2013     Lab Results   Component Value Date    LDLCALC 168 (H) 06/20/2013     No results found for: LABVLDL, VLDL  No results found for: CHOLHDLRATIO  CMP:    Lab Results   Component Value Date     09/17/2019    K 4.5 09/17/2019    K 4.6 08/30/2019    CL 99 09/17/2019    CO2 24 09/17/2019    BUN 15 09/17/2019    CREATININE 0.72 09/17/2019    GFRAA >60.0 09/17/2019    LABGLOM >60.0 09/17/2019    GLUCOSE 97 09/17/2019    GLUCOSE 95 04/26/2012    PROT 6.3 08/30/2019    LABALBU 3.2 08/30/2019    LABALBU 4.5 04/26/2012    CALCIUM 9.1 09/17/2019    BILITOT <0.2 08/30/2019    ALKPHOS 45 08/30/2019    AST 25 08/30/2019    ALT 29 08/30/2019     BMP:    Lab Results   Component Value Date     09/17/2019    K 4.5 09/17/2019    K 4.6 08/30/2019    CL 99 09/17/2019    CO2 24 09/17/2019    BUN 15 09/17/2019    LABALBU 3.2 08/30/2019    LABALBU 4.5 04/26/2012    CREATININE 0.72 09/17/2019 CALCIUM 9.1 09/17/2019    GFRAA >60.0 09/17/2019    LABGLOM >60.0 09/17/2019    GLUCOSE 97 09/17/2019    GLUCOSE 95 04/26/2012     Magnesium:    Lab Results   Component Value Date    MG 1.8 02/27/2019     TSH:No results found for: TSHFT4, TSH    Patient Active Problem List   Diagnosis    Essential hypertension    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 (HCC)    Seasonal allergic rhinitis    Chronic sinusitis    Acute blood loss anemia    Gastrointestinal hemorrhage    Depression    Lipid disorder    Paroxysmal atrial fibrillation with RVR (HCC)    Post PTCA    Steroid-induced osteoporosis    Vitamin D deficiency    Synovitis    PE [facility] throm[facility]embolism) ([facility] [address]    History of non-ST elevation myocardial infarction (NSTEMI)    Long-term use of high-risk medication    Methotrexate, long term, current use    Nicotine dependence, uncomplicated    Ventricular tachycardia (HCC)    GI bleed    Anemia due to acute blood loss    Gastrointestinal hemorrhage with melena    Herpes zoster with complication       Medications:  Current Outpatient Medications   Medication Sig Dispense Refill    metoprolol tartrate (LOPRESSOR) 50 MG tablet Take 1.5 tablets by mouth 2 times daily 90 tablet 3    atorvastatin (LIPITOR) 80 MG tablet Take 80 mg by mouth daily      Cyanocobalamin (B-12 PO) Take by mouth      Multiple Vitamins-Minerals (MULTIVITAMIN ADULT PO) Take by mouth      ezetimibe (ZETIA) 10 MG tablet Take 10 mg by mouth daily      methotrexate (RHEUMATREX) 2.5 MG chemo tablet TAKE 10 TABLETS ONCE A WEEK WITH FOOD - NO ALCOHOL AND HOLD IF ON ANTIBIOTICS OR IF ILL      Coenzyme Q10 (COQ-10) 50 MG CAPS Take 50 mg by mouth 2 times daily      clopidogrel (PLAVIX) 75 MG tablet Take 75 mg by mouth daily      esomeprazole Magnesium (NEXIUM) 40 MG PACK Take 40 mg by mouth daily      folic acid (FOLVITE) 1 MG tablet Take 1 mg by mouth daily

## 2020-07-22 NOTE — PROGRESS NOTES
[facility] CARDIOLOGY OFFICE FOLLOW-UP      Patient: Maria Luisa Navarrete  YOB: 1950  MRN: [id number]    Chief Complaint:  Chief Complaint   Patient presents with    3 Month Follow-Up    Results     CAROTID U/S       Subjective/HPI    The patient is followed in the cardiology office chronically for the following problems: CAD, PCI LAD, RCA, LCx, normal LV, VT, HTN, HPL, afib, DVT, GIB    The last encounter focused on the following: followup    Testing/hospitalizations/procedures performed since last encounter: none    Since the last encounter, the patient has had some episodes of sweating not had new symptoms/events.     Past Medical History:   Diagnosis Date    Depression 2007    History of non-ST elevation myocardial infarction (NSTEMI) 10/19/2018    Overview:  H POA Recent  Chest pain with sweats A CE+ Loaded with plavix  for cath with Dr Arlene Pereyra yesterday PCI QIAN to RCA P Continue ASA, Plavix and xarelot for 1 month then DC ASA,continue on  BB and increased lipitor dose    Hyperlipidemia     Hypertension     Osteoarthritis     RA (rheumatoid arthritis) ([address]     [facility] Dr. Sandor Mason Rheumatoid arthritis(714.0)        Past Surgical History:   Procedure Laterality Date    COLONOSCOPY N/A 4/4/2019    COLONOSCOPY performed by Alice Milian MD at [address]  2004    OPEN REDUCTION LEFT LEG    UPPER GASTROINTESTINAL ENDOSCOPY N/A 11/10/2018    EGD ESOPHAGOGASTRODUODENOSCOPY performed by Alice Milian MD at [address] N/A 4/3/2019    EGD ESOPHAGOGASTRODUODENOSCOPY performed by Aliec Milian MD at [facility] History   Problem Relation Age of Onset    High Blood Pressure Father     Heart Attack Father        Social History     Socioeconomic History    Marital status:      Spouse name: Not on file    Number of children: Not on file    Years of education: Not on file    Highest education level: Not on file   Occupational History    Not on file   Social Needs    Financial resource strain: Not on file    Food insecurity     Worry: Not on file     Inability: Not on file    Transportation needs     Medical: Not on file     Non-medical: Not on file   Tobacco Use    Smoking status: Former Smoker     Packs/day: 1.00     Years: 45.00     Pack years: 45.00     Types: Cigarettes     Last attempt to quit: 2017     Years since quittin.7    Smokeless tobacco: Never Used   Substance and Sexual Activity    Alcohol use: No    Drug use: No    Sexual activity: Not Currently   Lifestyle    Physical activity     Days per week: Not on file     Minutes per session: Not on file    Stress: Not on file   Relationships    Social connections     Talks on phone: Not on file     Gets together: Not on file     Attends Synagogue service: Not on file     Active member of club or organization: Not on file     Attends meetings of clubs or organizations: Not on file     Relationship status: Not on file    Intimate partner violence     Fear of current or ex partner: Not on file     Emotionally abused: Not on file     Physically abused: Not on file     Forced sexual activity: Not on file   Other Topics Concern    Not on file   Social History Narrative    LIVES W WIFE       Allergies   Allergen Reactions    Antihistamine [Diphenhydramine Hcl] Other (See Comments)     jittery       Current Outpatient Medications   Medication Sig Dispense Refill    atorvastatin (LIPITOR) 80 MG tablet Take 1 tablet by mouth daily 30 tablet 3    metoprolol tartrate (LOPRESSOR) 50 MG tablet Take 1.5 tablets by mouth 2 times daily 135 tablet 3    Cyanocobalamin (B-12 PO) Take by mouth      Multiple Vitamins-Minerals (MULTIVITAMIN ADULT PO) Take by mouth      ezetimibe (ZETIA) 10 MG tablet Take 10 mg by mouth daily      methotrexate (RHEUMATREX) 2.5 MG chemo tablet TAKE 10 TABLETS ONCE A WEEK WITH FOOD - NO ALCOHOL AND HOLD IF ON ANTIBIOTICS OR IF ILL      Coenzyme Q10 (COQ-10) 50 MG CAPS Take 50 mg by mouth 2 times daily      clopidogrel (PLAVIX) 75 MG tablet Take 75 mg by mouth daily      esomeprazole Magnesium (NEXIUM) 40 MG PACK Take 40 mg by mouth daily      folic acid (FOLVITE) 1 MG tablet Take 1 mg by mouth daily      lisinopril (PRINIVIL;ZESTRIL) 10 MG tablet Take 10 mg by mouth daily      predniSONE (DELTASONE) 5 MG tablet Take 5 mg by mouth daily      VITAMIN D, CHOLECALCIFEROL, PO Take 4,000 Int'l Units by mouth daily       warfarin (COUMADIN) 4 MG tablet Take 4 mg by mouth      gabapentin (NEURONTIN) 100 MG capsule Take 1 capsule by mouth 3 times daily for 30 days. 90 capsule 0     No current facility-administered medications for this visit. Review of Systems:   Review of Systems   Constitutional: Negative for activity change and appetite change. HENT: Negative for congestion. Respiratory: Negative for apnea, choking and chest tightness. Cardiovascular: Negative for chest pain. Gastrointestinal: Negative for abdominal distention and abdominal pain. Endocrine: Negative for cold intolerance and heat intolerance. Genitourinary: Negative for dysuria and enuresis. Musculoskeletal: Negative for arthralgias and back pain. Skin: Negative for color change. Allergic/Immunologic: Negative. Neurological: Negative for dizziness, seizures, syncope and light-headedness. Psychiatric/Behavioral: Negative for agitation, behavioral problems and confusion. Physical Examination:    /70 (Site: Right Upper Arm, Position: Sitting, Cuff Size: Large Adult)   Pulse 85   Wt 210 lb (95.3 kg)   SpO2 98%   BMI 30.13 kg/m²    Physical Exam   Constitutional: The patient appears healthy. No distress. HENT: Mouth/Throat: Oropharynx is clear. Eyes: Pupils are equal, round, and reactive to light. Neck: Normal range of motion. No JVD present.    Cardiovascular: Regular rhythm, S1 normal, S2 normal, normal heart sounds and intact distal pulses. Exam reveals no gallop. No murmur heard. Pulses:       Radial pulses are 2+ on the right side. Dorsalis pedis pulses are 2+ on the right side. Pulmonary/Chest: Effort normal and breath sounds normal. No wheezes. No rales. No tenderness. Abdominal: Soft. Bowel sounds are normal.   Musculoskeletal: Normal range of motion. No edema. Neurological: The patient is alert and oriented to person, place, and time. Intact cranial nerves. Skin: Skin is warm and dry. No rash noted.        LABS:  CBC:   Lab Results   Component Value Date    WBC 7.3 09/17/2019    RBC 3.83 09/17/2019    RBC 4.38 04/26/2012    HGB 12.1 09/17/2019    HCT 37.1 09/17/2019    MCV 96.7 09/17/2019    MCH 31.5 09/17/2019    MCHC 32.5 09/17/2019    RDW 25.2 09/17/2019     09/17/2019    MPV 8.3 04/26/2012     Lipids:  Lab Results   Component Value Date    CHOL 244 (H) 06/20/2013     Lab Results   Component Value Date    TRIG 86 06/20/2013     Lab Results   Component Value Date    HDL 59 06/20/2013     Lab Results   Component Value Date    LDLCALC 168 (H) 06/20/2013     No results found for: LABVLDL, VLDL  No results found for: CHOLHDLRATIO  CMP:    Lab Results   Component Value Date     09/17/2019    K 4.5 09/17/2019    K 4.6 08/30/2019    CL 99 09/17/2019    CO2 24 09/17/2019    BUN 15 09/17/2019    CREATININE 0.72 09/17/2019    GFRAA >60.0 09/17/2019    LABGLOM >60.0 09/17/2019    GLUCOSE 97 09/17/2019    GLUCOSE 95 04/26/2012    PROT 6.3 08/30/2019    LABALBU 3.2 08/30/2019    LABALBU 4.5 04/26/2012    CALCIUM 9.1 09/17/2019    BILITOT <0.2 08/30/2019    ALKPHOS 45 08/30/2019    AST 25 08/30/2019    ALT 29 08/30/2019     BMP:    Lab Results   Component Value Date     09/17/2019    K 4.5 09/17/2019    K 4.6 08/30/2019    CL 99 09/17/2019    CO2 24 09/17/2019    BUN 15 09/17/2019    LABALBU 3.2 08/30/2019    LABALBU 4.5 04/26/2012    CREATININE 0.72 09/17/2019    CALCIUM 9.1 09/17/2019    GFRAA >60.0 09/17/2019    LABGLOM >60.0 09/17/2019    GLUCOSE 97 09/17/2019    GLUCOSE 95 04/26/2012     Magnesium:    Lab Results   Component Value Date    MG 1.8 02/27/2019     TSH:No results found for: TSHFT4, TSH    Patient Active Problem List   Diagnosis    Essential hypertension    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 (HCC)    Seasonal allergic rhinitis    Chronic sinusitis    Acute blood loss anemia    Gastrointestinal hemorrhage    Depression    Lipid disorder    Paroxysmal atrial fibrillation with RVR (HCC)    Post PTCA    Steroid-induced osteoporosis    Vitamin D deficiency    Synovitis    PE (pulmonary thromboembolism) ([facility] [address]    History of non-ST elevation myocardial infarction (NSTEMI)    Long-term use of high-risk medication    Methotrexate, long term, current use    Nicotine dependence, uncomplicated    Ventricular tachycardia (HCC)    GI bleed    Anemia due to acute blood loss    Gastrointestinal hemorrhage with melena    Herpes zoster with complication       Medications:  Current Outpatient Medications   Medication Sig Dispense Refill    atorvastatin (LIPITOR) 80 MG tablet Take 1 tablet by mouth daily 30 tablet 3    metoprolol tartrate (LOPRESSOR) 50 MG tablet Take 1.5 tablets by mouth 2 times daily 135 tablet 3    Cyanocobalamin (B-12 PO) Take by mouth      Multiple Vitamins-Minerals (MULTIVITAMIN ADULT PO) Take by mouth      ezetimibe (ZETIA) 10 MG tablet Take 10 mg by mouth daily      methotrexate (RHEUMATREX) 2.5 MG chemo tablet TAKE 10 TABLETS ONCE A WEEK WITH FOOD - NO ALCOHOL AND HOLD IF ON ANTIBIOTICS OR IF ILL      Coenzyme Q10 (COQ-10) 50 MG CAPS Take 50 mg by mouth 2 times daily      clopidogrel (PLAVIX) 75 MG tablet Take 75 mg by mouth daily      esomeprazole Magnesium (NEXIUM) 40 MG PACK Take 40 mg by mouth daily      folic acid (FOLVITE) 1 MG tablet Take 1 mg by mouth daily      lisinopril (PRINIVIL;ZESTRIL) 10 MG tablet Take 10 mg by mouth daily      predniSONE (DELTASONE) 5 MG tablet Take 5 mg by mouth daily      VITAMIN D, CHOLECALCIFEROL, PO Take 4,000 Int'l Units by mouth daily       warfarin (COUMADIN) 4 MG tablet Take 4 mg by mouth      gabapentin (NEURONTIN) 100 MG capsule Take 1 capsule by mouth 3 times daily for 30 days. 90 capsule 0     No current facility-administered medications for this visit. Assessment/Plan:    1. Hyperlipidemia, unspecified hyperlipidemia type  The patient has hyperlipidemia without statin intolerance. The patient will be continued on high intensity statin. The labs are managed by PCP. As per recent guidelines, moderate dose high intensity statin is indicated. - atorvastatin (LIPITOR) 80 MG tablet; Take 1 tablet by mouth daily  Dispense: 30 tablet; Refill: 3    2. Coronary artery disease involving native coronary artery of native heart with other form of angina pectoris (HCC)  DAPT, statin, b-blocker and RF modification      3. Essential hypertension  Patient has essential hypertension on BP medications. The guideline-directed target for BP in this patient is <130/80. In order to reach our target BP we will continue current BP medications, increasing the dose of current meds or adding new to reach target. 4. Gastrointestinal hemorrhage associated with peptic ulcer  stable    5. Ventricular tachycardia ([address]  resolved    6. Paroxysmal atrial fibrillation with RVR ([facility])  coumadin       Counseling: the patient was counseled regarding diet, exercise, weight control and heart healthy lifestyle. Return in about 6 months (around 1/22/2021). Electronically signed by   Meme Stock.  Denise Pina MD [facility] Director of Cardiology Services and Cardiac Catheterization Laboratory  [facility], [place]    on 8/16/2020 at 2:08 PM

## 2020-12-01 PROBLEM — R07.9 ACUTE CHEST PAIN: Status: ACTIVE | Noted: 2020-01-01

## 2020-12-01 PROBLEM — I20.8 ANGINAL EQUIVALENT (HCC): Status: ACTIVE | Noted: 2020-01-01

## 2020-12-01 NOTE — ED TRIAGE NOTES
Patient arrived from home via wife after going to PCP at [facility] which he was tested for covid and was negative. Patient SOB for last 2 weeks. Patient A&OX4. Skin pink, warm, and dry. No distress noted. Patient denies chest pain, nausea, vomiting, diarrhea, blurred vision, and abd pain. Patient only able to say 3-4 words per sentence and having to pause to catch breathe. Patient did walking sp02 approx 50ft and destat to 92% with HR increased to 110. Dr. Natan Link made aware.

## 2020-12-01 NOTE — CARE COORDINATION
[facility] Case Management Initial Discharge Assessment    Met with patient to discuss discharge plan. PCP: Nida Angel MD                                  Date of Last Visit: 11/19/20 with phone/triage visits since. If no PCP, list provided? N/A    Discharge Planning    Living Arrangements: independently at home    Who do you live with? Spouse Nathaniel Clement [phone]    Who helps you with your care:  self    If lives at home:     Do you have any barriers navigating in your home? no    Patient can perform ADL? Yes    Current Services (outpatient and in home) :  None    Dialysis: No    Is transportation available to get to your appointments? Yes    DME Equipment:  no    Respiratory equipment: None    Respiratory provider:  no     Pharmacy:  yes [facility] in [place]    Consult with Medication Assistance Program?  No      Patient agreeable to [address]? Yes, [address] he has used [facility] in the past and would use them again. Patient agreeable to SNF/Rehab? N/A    Other discharge needs identified? Other - To be determined after work-up. Freedom of choice list provided with basic dialogue that supports the patient's individualized plan of care/goals and shares the quality data associated with the providers. No    Does Patient Have a High-Risk for Readmission Diagnosis (CHF, PN, MI, COPD)? No     History: RA, OA, NSTEMI    The plan for Transition of Care is related to the following treatment goals: Eval/mgmt of respiratory/cardiac issues prior to discharge. Initial Discharge Plan? (Note: please see concurrent daily documentation for any updates after initial note). Home w/spouse and HHC if indicated. The Patient and/or patient representative: Patient was provided with notice of choice of post-acute providers for care and equipment and agrees with discharge plan to home w/HHC.   Yes    Electronically signed by Bernice Ramirez RN on 12/1/2020 at 4:37 PM

## 2020-12-01 NOTE — PROGRESS NOTES
1740 Received from ER via cart in stable condition. Walked from cart to bed, gait slow but steady, rosenda well. Patient A/O X4, following all commands. VSS, O2 Sats high 90's on room air. Denies chest pain, and or SOB at present time. Oriented to room and bed controls. Resting without   Complaints. 1800 Attempted to review home medication with patient, he is unsure of name of meds and dosage amounts. Will call home at some point and get from family member and let us know.

## 2020-12-01 NOTE — ACP (ADVANCE CARE PLANNING)
No      Resuscitation  \"CPR works best to restart the heart when there is a sudden event, like a heart attack, in someone who is otherwise healthy. Unfortunately, CPR does not typically restart the heart for people who have serious health conditions or who are very sick. \"    \"In the event your heart stopped as a result of an underlying serious health condition, would you want attempts to be made to restart your heart (answer \"yes\" for attempt to resuscitate) or would you prefer a natural death (answer \"no\" for do not attempt to resuscitate)? \" yes      NOTE: If the patient has a valid advance directive AND now provides care preference(s) that are inconsistent with that prior directive, advise the patient to consider either: creating a new advance directive that complies with state-specific requirements; or, if that is not possible, orally revoking that prior directive in accordance with state-specific requirements, which must be documented in the EHR. [x] Yes   [] No   Educated Patient / Florene Billing regarding differences between Advance Directives and portable DNR orders. Length of ACP Conversation in minutes:      Conversation Outcomes:  [x] ACP discussion completed  [x] Existing advance directive reviewed with patient; no changes to patient's previously recorded wishes  [] New Advance Directive completed  [] Portable Do Not Rescitate prepared for Provider review and signature  [] POLST/POST/MOLST/MOST prepared for Provider review and signature      Follow-up plan:    [] Schedule follow-up conversation to continue planning  [] Referred individual to Provider for additional questions/concerns   [] Advised patient/agent/surrogate to review completed ACP document and update if needed with changes in condition, patient preferences or care setting    [x] This note routed to one or more involved healthcare providers      NOTE: Patient has both a Living Will and HC-POA. His spouse DARY MOREIRA is his primary HC-POA.

## 2020-12-01 NOTE — ED PROVIDER NOTES
noted above the remainder of the review of systems was reviewed and negative. PAST MEDICAL HISTORY     Past Medical History:   Diagnosis Date    Depression 2007    History of non-ST elevation myocardial infarction (NSTEMI) 10/19/2018    Overview:  H POA Recent  Chest pain with sweats A CE+ Loaded with plavix  for cath with Dr Tomasa Baker yesterday PCI QIAN to RCA P Continue ASA, Plavix and xarelot for 1 month then DC ASA,continue on  BB and increased lipitor dose    Hyperlipidemia     Hypertension     Osteoarthritis     RA (rheumatoid arthritis) ([address]     [facility] Dr. Tristan Elizalde arthritis(714.0)          SURGICALHISTORY       Past Surgical History:   Procedure Laterality Date    COLONOSCOPY N/A 4/4/2019    COLONOSCOPY performed by George Bass MD at [address]  2004    OPEN REDUCTION LEFT LEG    UPPER GASTROINTESTINAL ENDOSCOPY N/A 11/10/2018    EGD [facility]    EGD ESOPHAGOGASTRODUODENOSCOPY performed by George Bass MD at [address]       Current Discharge Medication List      CONTINUE these medications which have NOT CHANGED    Details   baclofen (LIORESAL) 10 MG tablet Take 10 mg by mouth 3 times daily      ferrous sulfate (IRON 325) 325 (65 Fe) MG tablet Take 325 mg by mouth daily (with breakfast)      leflunomide (ARAVA) 20 MG tablet Take 20 mg by mouth daily      pregabalin (LYRICA) 75 MG capsule Take 75 mg by mouth 2 times daily.       Tofacitinib Citrate (XELJANZ PO) Take 11 mg by mouth daily      atorvastatin (LIPITOR) 80 MG tablet Take 1 tablet by mouth daily  Qty: 30 tablet, Refills: 3    Associated Diagnoses: Hyperlipidemia, unspecified hyperlipidemia type      metoprolol tartrate (LOPRESSOR) 50 MG tablet Take 1.5 tablets by mouth 2 times daily  Qty: 135 tablet, Refills: 3      warfarin (COUMADIN) 4 MG tablet Take 4 mg by mouth 4mg chart in the absence of a cardiologist.    EKG shows normal sinus rhythm 89 bpm MI interval 142 ms QRS duration 78 ms  ms no acute ST elevations or depressions noted. RADIOLOGY:   Non-plain filmimages such as CT, Ultrasound and MRI are read by the radiologist. Plain radiographic images are visualized and preliminarily interpreted by the emergency physician with the below findings:        Interpretation per the Radiologist below, if available at the time ofthis note:    CTA CHEST W WO CONTRAST   Final Result      No CT evidence pulmonary embolism. Emphysema. Dependent subsegmental atelectatic changes discussed, right greater than left, with corresponding signs of air trapping, greatest in right lung. All CT scans at this facility use dose modulation, iterative reconstruction, and/or weight based dosing when appropriate to reduce radiation dose to as low as reasonably achievable. XR CHEST PORTABLE   Final Result   SUSPECT RIGHT UPPER LUNG ZONE INFILTRATE.                         ED BEDSIDE ULTRASOUND:   Performed by ED Physician - none    LABS:  Labs Reviewed   COMPREHENSIVE METABOLIC PANEL - Abnormal; Notable for the following components:       Result Value    Sodium 131 (*)     Potassium 5.2 (*)     Glucose 125 (*)     BUN 33 (*)     CREATININE 1.23 (*)     GFR Non- 58.5 (*)     Total Protein 6.0 (*)     Alb 3.0 (*)     AST 45 (*)     All other components within normal limits   CBC WITH AUTO DIFFERENTIAL - Abnormal; Notable for the following components:    RBC 3.47 (*)     Hemoglobin 9.0 (*)     Hematocrit 27.9 (*)     MCH 26.0 (*)     MCHC 32.2 (*)     RDW 29.4 (*)     Neutrophils Absolute 7.7 (*)     Lymphocytes Absolute 0.1 (*)     Monocytes Absolute 0.0 (*)     All other components within normal limits   LACTIC ACID, PLASMA - Abnormal; Notable for the following components:    Lactic Acid 3.4 (*)     All other components within normal limits    Narrative: Jeana Ron tel. [phone],  Buckley Favre results called to and read back by Dr. Julien Truong, 12/01/2020 13:06, by Mary Ann Horton   MAGNESIUM - Abnormal; Notable for the following components:    Magnesium 1.4 (*)     All other components within normal limits   TROPONIN - Abnormal; Notable for the following components:    Troponin 0.141 (*)     All other components within normal limits    Narrative:     Jeana Velasquez tel. [phone],  TROP results called to and read back by Dr. Julien Truong, 12/01/2020 13:38, by Mary Ann Horton   TSH WITHOUT REFLEX - Abnormal; Notable for the following components:    TSH 0.420 (*)     All other components within normal limits    Narrative:     Jeana Velasquez tel. [phone],  TROP results called to and read back by Dr. Julien Truong, 12/01/2020 13:38, by Mary Ann Horton   T4, FREE - Abnormal; Notable for the following components:    T4 Free 0.76 (*)     All other components within normal limits    Narrative:     Jeana Velasquez tel. [phone],  TROP results called to and read back by Dr. Julien Truong, 12/01/2020 13:38, by Sapphire Osbornfoot - Abnormal; Notable for the following components:    Protime 28.6 (*)     All other components within normal limits   D-DIMER, QUANTITATIVE - Abnormal; Notable for the following components:    D-Dimer, Quant 0.83 (*)     All other components within normal limits    Narrative:     CALL  Guerra  [facility] tel. [phone],  DIMER results called to and read back by Jan Mccarty, 12/01/2020 13:46, by  Leonard Sorto   POCT ARTERIAL - Abnormal; Notable for the following components:    POC Sodium 129 (*)     POC Glucose 133 (*)     GFR Non-African American 55 (*)     Calcium, Ion 0.94 (*)     pH, Arterial 7.542 (*)     pCO2, Arterial 24 (*)     pO2, Arterial 75 (*)     HCO3, Arterial 20.9 (*)     O2 Sat, Arterial 97 (*)     Lactate 2.71 (*)     POC Hematocrit 24 (*)     Hemoglobin 8.3 (*)     All other components within normal limits   POCT GLUCOSE - Abnormal; Notable for the following components:    POC Glucose 209 (*)

## 2020-12-02 PROBLEM — I21.4 NSTEMI (NON-ST ELEVATED MYOCARDIAL INFARCTION) (HCC): Status: ACTIVE | Noted: 2020-01-01

## 2020-12-02 NOTE — BRIEF OP NOTE
Section of Cardiology  Adult Brief Cardiac Cath Procedure Note        Procedure(s):  LHC, b/l coronary angio    Pre-operative Diagnosis:  SOB. CP    H&P Status: Completed and reviewed. Post-operative Diagnosis:      LV EF of 60%, normal LVEDP  LM Separate ostium  LAD patent stents with mild LAD  CX patent stents  RCA patent stents    Findings:  See full report    Complications:  none    Primary Proceduralist:   Dr.Wes Harvey DO    Plan    RFM  Max med rx.          Full procedure note to follow

## 2020-12-02 NOTE — PROGRESS NOTES
12/1/20 1923 Requested home medication reconciliation from hospitalist.   12/2/20 Requested home medication reconciliation and cardiology consult from hospitalist.

## 2020-12-02 NOTE — PLAN OF CARE
Problem: Breathing Pattern - Ineffective:  Goal: Ability to achieve and maintain a regular respiratory rate will improve  Description: Ability to achieve and maintain a regular respiratory rate will improve  Outcome: Ongoing     Problem: Pain:  Goal: Pain level will decrease  Description: Pain level will decrease  Outcome: Ongoing  Goal: Control of acute pain  Description: Control of acute pain  Outcome: Ongoing  Goal: Control of chronic pain  Description: Control of chronic pain  Outcome: Ongoing

## 2020-12-02 NOTE — H&P
History and Physical  Patient: Jarod Ruvalcaba  Unit/Bed:W573/W573-01  YOB: 1950  MRN: [id number]  Acct: [de-identified]   Admitting Diagnosis: Anginal equivalent [facility]) [I20.8]  Admit Date:  12/1/2020  Hospital Day: 0      Chief Complaint:   Dyspnea on exertion    History of Present Illness: This is a very pleasant 60-year-old  male with past medical history significant for coronary artery disease status post multiple PCI with most recent PCI with drug-eluting stent of the ostial LAD on 1/12/2019 and proximal to mid circumflex on 1/31/2019, hypertension, dyslipidemia, rheumatoid arthritis, history of paroxysmal atrial fibrillation, history of DVT and PE, chronic anticoagulation with Coumadin, history of GI bleed secondary to duodenal ulcer, former tobacco abuse and multiple medical problems who presented to the emergency room yesterday with chief complaint of shortness of breath with exertion. Over the past 2 weeks or so patient's been experiencing progressively worsening shortness of breath with less and less activity. He states on Saturday he walked from his bedroom to the bathroom and was gasping for breath. He has slight cough at times. On Saturday he did have a fever of 102 °F at home. He presented to [facility] the ER on 11/28/2020 and again on 11/29/2020 due to these above-noted symptoms and Covid test was completed and negative and both times was discharged home. He followed up with his PCP yesterday and due to concerns for cardiac etiology he was sent to [address] [place] [address] ER for further evaluation. On presentation to the emergency room, blood pressure 138/80, heart rate 94, respiratory rate 20, pulse ox 94%, temperature 98.6 °F.  Sodium low 131, potassium elevated 5.2, chloride 99, total CO2 of 20, BUN elevated at 33, creatinine elevated 1.23, GFR low at 58.5, glucose 125. Magnesium low at 1.4. Lactic acid elevated at 3.4. Troponin elevated at 0.141.   AST mildly elevated 45, ALT 33. TSH low at 0.420 and free T4 low at 0.76. WBC 8.0, hemoglobin low at 9.0, hematocrit low at 27.9, platelets 139. INR therapeutic at 2.7. D-dimer mildly elevated 0.83. Blood cultures checked and pending. Initial Covid test negative. CTA of the chest completed on 12/1/2020 revealed no evidence of pulmonary embolism, emphysema, dependent subsegmental atelectatic changes right greater than left with corresponding signs of air trapping greatest in the right lung. Chest x-ray revealed patchy area of increased opacity in the right midlung zone suspicious for small infiltrate. EKG revealed sinus rhythm with no acute ischemic changes. He was admitted for further evaluation. At time of evaluation today, he is seen on 11 W. resting comfortably and in no acute distress. Covid precautions in place and second Covid test pending this afternoon. Initial Covid test was negative. His initial troponin in the ER was elevated at 0.141 and repeat troponin pending this morning. proBNP only 734. Repeat BMP pending. He is hemodynamically stable. On telemetry he is sinus rhythm to sinus tachycardia with heart rates 90s to 100s. Telemetry alarms reviewed show brief episode of PA fib with RVR with heart rate in the 140s yesterday evening between 2054 and 2057. He is in no distress at this time. His Coumadin is currently on hold and will plan to initiate anticoagulation with Lovenox when INR less than 2 while Coumadin remains on hold. Per patient, he has chronic anemia with hemoglobin typically around 9. No overt signs of bleeding although his stools are always dark in color as he takes iron supplements.     Allergies   Allergen Reactions    Antihistamine [Diphenhydramine Hcl] Other (See Comments)     jittery       Current Facility-Administered Medications   Medication Dose Route Frequency Provider Last Rate Last Dose    baclofen (LIORESAL) tablet 10 mg  10 mg Oral TID MARIO ALBERTO Kinsey        clopidogrel mg Oral Nightly Gil Timmons MD        aspirin chewable tablet 81 mg  81 mg Oral Daily Gil Timmons MD   81 mg at 12/02/20 0801    nitroGLYCERIN (NITROSTAT) SL tablet 0.4 mg  0.4 mg Sublingual Q5 Min PRN Gil Timmons MD           PMHx:  Past Medical History:   Diagnosis Date    CAD S/P percutaneous coronary angioplasty     Depression 2007    History of DVT (deep vein thrombosis)     History of non-ST elevation myocardial infarction (NSTEMI) 10/19/2018    Overview:  H POA Recent  Chest pain with sweats A CE+ Loaded with plavix  for cath with Dr Amanda Rutherford yesterday PCI QIAN to RCA P Continue ASA, Plavix and xarelot for 1 month then DC ASA,continue on  BB and increased lipitor dose    History of pulmonary embolism     Hyperlipidemia     Hypertension     Osteoarthritis     Paroxysmal atrial fibrillation (HCC)     RA (rhe[facility]oid arthritis) ([facility]     [facility] Dr. Ally Crane Rheumatoid arthritis(714.0)        PSHx:  Past Surgical History:   Procedure Laterality Date    COLONOSCOPY N/A 4/4/2019    COLONOSCOPY performed by Romulo Irving MD at [address]  2004    OPEN REDUCTION LEFT LEG    UPPER GASTROINTESTINAL ENDOSCOPY N/A 11/10/2018    EGD ESOPHAGOGASTRODUODENOSCOPY performed by Romulo Irving MD at [facility]. N/A 4/3/2019    EGD ESOPHAGOGASTRODUODENOSCOPY performed by Romulo Irving MD at [place] [address] Hx:  Social History     Socioeconomic History    Marital status:      Spouse name: None    Number of children: None    Years of education: None    Highest education level: None   Occupational History    None   Social Needs    Financial resource strain: None    Food insecurity     Worry: None     Inability: None    Transportation needs     Medical: None     Non-medical: None   Tobacco Use    Smoking status: Former Smoker     Packs/day: 1.00     Years: 50.00     Pack years: 50.00     Types: Cigarettes     Last attempt to quit: 11/16/2017     Years since quitting: 3.0    Smokeless tobacco: Never Used   Substance and Sexual Activity    Alcohol use: No    Drug use: No    Sexual activity: Not Currently   Lifestyle    Physical activity     Days per week: None     Minutes per session: None    Stress: None   Relationships    Social connections     Talks on phone: None     Gets together: None     Attends [employer] service: None     Active member of club or organization: None     Attends meetings of clubs or organizations: None     Relationship status: None    Intimate partner violence     Fear of current or ex partner: None     Emotionally abused: None     Physically abused: None     Forced sexual activity: None   Other Topics Concern    None   Social History Narrative    LIVES W WIFE       Family Hx:  Family History   Problem Relation Age of Onset    High Blood Pressure Father     Heart Attack Father     Coronary Art Dis Father        Review ofSystems:   Review of Systems   Constitutional: Positive for fever (on Saturday). Negative for activity change and chills. HENT: Negative for congestion. Respiratory: Positive for cough (occasional) and shortness of breath (with exertion). Negative for chest tightness. Cardiovascular: Positive for chest pain (brief intermittent). Negative for palpitations and leg swelling. Gastrointestinal: Negative for abdominal pain, nausea and vomiting. Genitourinary: Negative for difficulty urinating. Musculoskeletal: Negative for myalgias. Skin: Negative for color change, pallor and rash. Neurological: Negative for dizziness, syncope and light-headedness. Psychiatric/Behavioral: Negative for agitation and behavioral problems. Physical Examination:    BP (!) 155/68   Pulse 88   Temp 98.6 °F (37 °C) (Oral)   Resp 16   Ht 5' 10\" (1.778 m)   Wt 200 lb (90.7 kg)   SpO2 95%   BMI 28.70 kg/m²    Physical Exam  Constitutional:       Appearance: Normal appearance. He is obese. PROT 6.0 12/01/2020    LABALBU 3.0 12/01/2020    LABALBU 4.5 04/26/2012    CALCIUM 8.6 12/01/2020    BILITOT 0.5 12/01/2020    ALKPHOS 38 12/01/2020    AST 45 12/01/2020    ALT 33 12/01/2020     BMP:    Lab Results   Component Value Date     12/01/2020    K 5.2 12/01/2020    K 4.6 08/30/2019    CL 99 12/01/2020    CO2 20 12/01/2020    BUN 33 12/01/2020    LABALBU 3.0 12/01/2020    LABALBU 4.5 04/26/2012    CREATININE 1.3 12/01/2020    CREATININE 1.23 12/01/2020    CALCIUM 8.6 12/01/2020    GFRAA >60 12/01/2020    LABGLOM 55 12/01/2020    GLUCOSE 125 12/01/2020    GLUCOSE 95 04/26/2012     Magnesium:    Lab Results   Component Value Date    MG 1.9 12/02/2020     Troponin:    Lab Results   Component Value Date    TROPONINI 0.141 12/01/2020     Recent Labs     12/02/20  0728   PROBNP 734     Recent Labs     12/01/20  1200   INR 2.7       RADIOLOGY:  Cta Chest W Wo Contrast    Result Date: 12/1/2020  EXAM: CT SCAN OF THE THORAX WITH CONTRAST/PE PROTOCOL/CTA CHEST COMPARISON: CHEST RADIOGRAPHS, DECEMBER 1, 2020, APRIL 2, 2019. REASON FOR EXAMINATION:  SHORTNESS OF BREATH FOR SEVERAL WEEKS TECHNIQUE: Helical CTA was performed through the chest utilizing 100 mL of Isovue 300 intravenous contrast medium. .  Images were obtained with bolus tracking in order to opacify the pulmonary arteries. Thick section coronal MIP 3D reconstructions were performed on a separate workstation. FINDINGS:  No intraluminal filling defects, pulmonary arterial tree. Thoracic aorta normal in course and caliber. Cardiac size normal. No pericardial effusion. Coronary calcification identified. No hilar, mediastinal, or axillary lymph node enlargement. Limited imaging upper abdomen shows small hiatal hernia. Adrenal glands without anomaly. Right lung shows emphysematous change, with fibrous scarring at right lung apex. Dependent subsegmental atelectatic change, right upper lobe, with mosaic morphology identified.  Dependent subsegmental atelectatic change right lower lobe. Focal area of bullous change right lung base. No nodules, masses, pleural effusion. Left lung shows emphysematous change with fibrous scarring at left lung apex. Dependent subsegmental atelectatic change, left lung base. No osteoblastic, no osteolytic lesions. No CT evidence pulmonary embolism. Emphysema. Dependent subsegmental atelectatic changes discussed, right greater than left, with corresponding signs of air trapping, greatest in right lung. All CT scans at this facility use dose modulation, iterative reconstruction, and/or weight based dosing when appropriate to reduce radiation dose to as low as reasonably achievable. Xr Chest Portable    Result Date: 12/1/2020  EXAMINATION: XR CHEST PORTABLE. DATE AND TIME:12/1/2020 12:12 PM CLINICAL HISTORY: SHORTNESS OF BREATH   SOB  COMPARISONS: FEBRUARY 27, 2019  FINDINGS: Patchy area of increased opacity in the right midlung zone suspicious  for small infiltrate. A Chest x-ray is insensitive in mild or early COVID-19  infection. Furthermore the specificity of chest x-ray findings for COVID-19 pneumonia is not established. Remaining lung fields clear. Heart and mediastinum within normal limits. SUSPECT RIGHT UPPER LUNG ZONE INFILTRATE. Echocardiogram 4/2/19:  Conclusions   Summary   Normal left ventricle structure and function. Left ventricular ejection fraction is visually estimated at 55%. Pseudonormal filling pattern noted. Signature   ----------------------------------------------------------------   Electronically signed by Corby Acuña MD(Interpreting   physician) on 04/03/2019 10:50 AM   ----------------------------------------------------------------   Findings  Left Ventricle  Normal left ventricle structure and function. Left ventricular ejection fraction is visually estimated at 55%. Pseudonormal filling pattern noted. Right Ventricle  Normal right ventricle structure and function.   Normal right ventricle systolic pressure. Left Atrium  Mildly dilated left atrium. Right Atrium  Normal right atrium. Mitral Valve  Normal mitral valve structure and function. Mild MR  Tricuspid Valve  Normal tricuspid valve structure and function. Mild TR  Aortic Valve  Normal aortic valve structure and function. Mild AR  Pulmonic Valve  Normal pulmonic valve structure and function. Pericardial Effusion  No evidence of pericardial effusion. Pleural Effusion  No evidence of pleural effusion. Aorta \ Miscellaneous  The aorta is within normal limits. [address] [facility] 1/12/19 at [address] [facility]:  FINDINGS:  LMT:  Left main trunk is absent with separate ostia of the circumflex  and LAD.     LAD:  Left anterior descending artery has two medium-sized diagonals,  large in caliber, has some tortuosity and calcium. The ostial vessel  has a 95% focal ostial stenosis.     LCX:  Left circumflex coronary artery is a separate ostium, has 60%-70%  mid stenosis in two places. It has a large lateral obtuse marginal.     RCA:  Right coronary artery is dominant, large in caliber, has a patent  previously placed stent, and several posterolateral branches, which are  at least medium-sized. There is diffuse mild disease with distally  40%-50% in the posterolateral ventricular branch. The mid RCA has  30%-40%.     HEMODYNAMICS:  Left ventricular end-diastolic pressure is 10 with no  gradient across the aortic valve.     LEFT VENTRICULOGRAPHY:  Ejection fraction is 60% with no wall motion  abnormalities, normal chamber size, and no mitral regurgitation.     PCI NOTE:  Successful percutaneous coronary intervention of the ostial  LAD, 95% stenosis with CHAYA 3 flow reduced to 10% residual with CHAYA 3  flow after implantation of a Xience 2.75 x 23 postdilated with the 3.0  and 3.5 noncompliant balloons.     IVUS NOTE:  The IVUS was performed using Volcano catheter and showed  coverage of the ostium with the stent, which was under deployed at the  ostium. Following that, the stent was re-dilated.     CONCLUSIONS:  PCI of ostial LAD, IVUS-guided, will need re-look at _____  and IFR of the circumflex in one to two weeks with maximum medical  Therapy. [facility] 1/31/19:  FINDINGS:  IFR of proximal left circumflex, 0.88 to 0.90, which is  significant.     PCI NOTE:  Successful PCI of proximal and mid left circumflex coronary  artery, 70% stenosis with CHAYA III flow reduced to 0% residual with CHAYA  III flow after implantation of Xience 2.5 x 18 and 2.5 x 28, post  dilated with 2.75 noncompliant balloon at high pressure.     CONCLUSION:  Successful PCI of the left circumflex with QIAN. Continue  Plavix and Xarelto. Maximal medical therapy. EKG 12/1/20: SR 89, no acute ischemic changes, QTc 401ms    Telemetry 12/2/20: SR/ST 90s-100s; PAF RVR with HR 140s yesterday between [id number]      Assessment:    Active Hospital Problems    Diagnosis Date Noted    Anginal equivalent ([address] [I20.8] 12/01/2020    Acute chest pain [R07.9] 12/01/2020     1. NSTEMI  2. Angina  3. Dyspnea on exertion  4. Hx CAD s/p PCI of RCA in 2018 [facility] and PCI QIAN ostial LAD 1/12/19 and prox/mid circ 1/31/19  5. Hx PA-fib--brief A-fib RVR yesterday PM  6. Hx PE  7. Chronic anticoagulation with coumadin--INR 2.7 on presentation  8. Rheumatoid arthritis  9. HTN  10. Dyslipidemia  11. ? RML pneumonia per CXR  12. Hyperkalemia  13. Hypomagnesemia  14. Chronic anemia  15. Hx GIB/duodenal ulcer    Plan:  1. ACS orders initiated--repeat troponin pending this morning  2. Maximize medical therapy-aspirin 81 mg p.o. daily, Plavix 75 mg p.o. daily, Lopressor 75 mg p.o. twice daily, Lipitor 80 mg tablet p.o. daily, lisinopril 10 mg p.o. daily, all other home medications as ordered, sublingual nitroglycerin as needed for chest pain  3. Hold oral anticoagulation with Coumadin for now. When INR is less than 2, will plan to initiate full dose anticoagulation with Lovenox 1 mg/kg subcu twice daily.   Will resume oral anticoagulation prior to discharge  4. Check echocardiogram  5. Cardiac diet recommended  6. Monitor H&H closely given anemia and Hx GIB as patient on plavix and anticoagulation  7. Monitor on telemetry for any tachycardia or bradycardia arrhythmias  8. Maintain potassium greater than 4, magnesium greater than 2  9. GI/DVT prophylaxis  10. Initial Covid test negative. Repeat Covid testing pending this afternoon  11. Internal medicine recommendations regarding possible infiltrate in right middle lobe per chest x-ray completed yesterday  12. Coronary evaluation per Dr. Diaz Situ. Given NSTEMI and symptoms concerning for angina with multiple presentations to the emergency room for similar symptoms and history of CAD status post previous PCI, will likely need cardiac catheterization for definitive diagnosis prior to discharge  13.  Further recommendations to follow        Electronically signed by MARIO ALBERTO Slater on 12/2/2020 at 10:05 AM

## 2020-12-02 NOTE — PROGRESS NOTES
Pt vitals are stable, no bleeding or hematoma at puncture site, report called to LEATHA Beltre [facility], will continue to monitor.

## 2020-12-02 NOTE — CONSULTS
Department of Internal Medicine  Consult Note          HISTORY OF PRESENT ILLNESS:                The patient is a 78 y.o. male with significant past medical history of coronary disease status post PCI, hypertension, hyperlipidemia, RA, paroxysmal atrial fibrillation anticoagulated on Coumadin, history of DVT and PE, history of GI bleed secondary to duodenal ulcer, chronic iron deficiency anemia, COPD, admitted 12/1 with symptoms of exertional dyspnea. States this is been chronic for the last 2 weeks. He had also admits to a cough that is chronic that he attributes to his history of smoking. He denies change in the nature of the cough or purulent sputum production. On Saturday reports fever at home and presented to the emergency department, was told his temperature was normal at that time and has not had subsequent symptoms. He has been tested for Covid 11/28,11/29, and subsequently this admission all of which were negative. Since admission the patient has been afebrile, no leukocytosis noted. CTA of the chest did not reveal pulmonary embolism. Emphysema noted, atelectasis and air bronchograms are noted. Patient was seen in the Cath Lab following cardiac catheterization. Currently denies pain, shortness of breath, headache, chills, nausea, vomiting, constipation, diarrhea, change in urinary symptoms. He denies sick contacts or recent travel.         Past Medical History:        Diagnosis Date    CAD S/P percutaneous coronary angioplasty     Depression 2007    History of DVT (deep vein thrombosis)     History of non-ST elevation myocardial infarction (NSTEMI) 10/19/2018    Overview:  H POA Recent  Chest pain with sweats A CE+ Loaded with plavix  for cath with Dr Nazia Espinal yesterday PCI QIAN to RCA P Continue ASA, Plavix and xarelot for 1 month then DC ASA,continue on  BB and increased lipitor dose    History of pulmonary embolism     Hyperlipidemia     Hypertension     Osteoarthritis     Intravenous, 2 times per day  sodium chloride flush 0.9 % injection 10 mL, 10 mL, Intravenous, PRN  acetaminophen (TYLENOL) tablet 650 mg, 650 mg, Oral, Q6H PRN **OR** acetaminophen (TYLENOL) suppository 650 mg, 650 mg, Rectal, Q6H PRN  polyethylene glycol (GLYCOLAX) packet 17 g, 17 g, Oral, Daily PRN  promethazine (PHENERGAN) tablet 12.5 mg, 12.5 mg, Oral, Q6H PRN **OR** ondansetron (ZOFRAN) injection 4 mg, 4 mg, Intravenous, Q6H PRN  atorvastatin (LIPITOR) tablet 80 mg, 80 mg, Oral, Nightly  aspirin chewable tablet 81 mg, 81 mg, Oral, Daily  nitroGLYCERIN (NITROSTAT) SL tablet 0.4 mg, 0.4 mg, Sublingual, Q5 Min PRN  Allergies:  Antihistamine [diphenhydramine hcl]    Social History:    50-pack-year history of smoking, quit 4 years ago. Denies alcohol or illicit drug use. Family History:       Problem Relation Age of Onset    High Blood Pressure Father     Heart Attack Father     Coronary Art Dis Father      REVIEW OF SYSTEMS:    12 point review systems is negative unless noted above  PHYSICAL EXAM:      Vitals:    BP (!) 167/91   Pulse 103   Temp 98.6 °F (37 °C) (Oral)   Resp 17   Ht 5' 10\" (1.778 m)   Wt 200 lb (90.7 kg)   SpO2 94%   BMI 28.70 kg/m²     GEN: Alert and oriented. NAD, well nourished  HEENT: Normocephalic, atraumatic. KAREN, Neck supple. Resp: CTA b/l no wheezing or rhonchi. No respiratory distress  Cardio: Normal sinus on my exam  Abd: Soft, nondistended. NTTP. BS present  Ext: No erythema or edema. LE NTTP  Skin is warm and dry  Patient is alert and oriented x3, moves all extremities.     DATA:    CBC:   Lab Results   Component Value Date    WBC 6.6 12/02/2020    RBC 3.46 12/02/2020    RBC 4.38 04/26/2012    HGB 8.8 12/02/2020    HCT 27.4 12/02/2020    MCV 79.2 12/02/2020    MCH 25.6 12/02/2020    MCHC 32.3 12/02/2020    RDW 30.3 12/02/2020     12/02/2020    MPV 8.3 04/26/2012     CMP:    Lab Results   Component Value Date     12/02/2020    K 4.2 12/02/2020    K 4.6 08/30/2019     12/02/2020    CO2 21 12/02/2020    BUN 24 12/02/2020    CREATININE 0.87 12/02/2020    GFRAA >60.0 12/02/2020    LABGLOM >60.0 12/02/2020    GLUCOSE 101 12/02/2020    GLUCOSE 95 04/26/2012    PROT 6.0 12/01/2020    LABALBU 3.0 12/01/2020    LABALBU 4.5 04/26/2012    CALCIUM 9.1 12/02/2020    BILITOT 0.5 12/01/2020    ALKPHOS 38 12/01/2020    AST 45 12/01/2020    ALT 33 12/01/2020     BMP:    Lab Results   Component Value Date     12/02/2020    K 4.2 12/02/2020    K 4.6 08/30/2019     12/02/2020    CO2 21 12/02/2020    BUN 24 12/02/2020    LABALBU 3.0 12/01/2020    LABALBU 4.5 04/26/2012    CREATININE 0.87 12/02/2020    CALCIUM 9.1 12/02/2020    GFRAA >60.0 12/02/2020    LABGLOM >60.0 12/02/2020    GLUCOSE 101 12/02/2020    GLUCOSE 95 04/26/2012       IMPRESSION/RECOMMENDATIONS:      NSTEMI, angina  Coronary artery disease  -Per primary  - Cardiac cath 12/2    Paroxysmal atrial fibrillation  -Per cardiology  -AC on Coumadin    Hypertension, hyperlipidemia: Continue home medications. PRN hydralazine    Iron deficiency anemia: Patient states he gets outpatient IV iron treatment. Appears close to his baseline.  -Monitor CBC    SOB/ Abnormality on chest x-ray: Patient has been afebrile, no leukocytosis. COVID-19 negative this admit. H/o RA  Low suspicion for acute infection at this time.  -Check procalcitonin    Emphysema: Patient has a 50-pack-year history of smoking. He states he follows with pulmonology at [facility] however is requesting to change providers. We will add pulmonology consult for further opinion on possible pneumonia as well as COPD evaluation. I do not see PFTS in the chart for review.    - PRN albuterol

## 2020-12-02 NOTE — PROGRESS NOTES
Pt is resting comfortably, vitals are stable, no chest pain or shortness of breath, no bleeding or hematoma at puncture site - right radial, will continue to monitor.

## 2020-12-03 NOTE — CARE COORDINATION
Rounds done this am with nursing and pt up and steady with no needs prior. Pt seen by ID and repeat blood cx being done. He had elevated temp today and repeat cxr.done. Workup and tx continues and we will follow to determine any new needs.

## 2020-12-03 NOTE — PROGRESS NOTES
[facility] Respiratory Therapy Evaluation   Current Order:  Albuterol MDI 2 puffs Q6 PRN      Home Regimen: None      Ordering Physician: Anthony  Re-evaluation Date:  ---     Diagnosis: Anginal equivalent      Patient Status: Stable / Unstable + Physician notified    The following MDI Criteria must be met in order to convert aerosol to MDI with spacer. If unable to meet, MDI will be converted to aerosol:  []  Patient able to demonstrate the ability to use MDI effectively  []  Patient alert and cooperative  []  Patient able to take deep breath with 5-10 second hold  []  Medication(s) available in this delivery method   []  Peak flow greater than or equal to 200 ml/min            Current Order Substituted To  (same drug, same frequency)   Aerosol to MDI [] Albuterol Sulfate 0.083% unit dose by aerosol Albuterol Sulfate MDI 2 puffs by inhalation with spacer    [] Levalbuterol 1.25 mg unit dose by aerosol Levalbuterol MDI 2 puffs by inhalation with spacer    [] Levalbuterol 0.63 mg unit dose by aerosol Levalbuterol MDI 2 puffs by inhalation with spacer    [] Ipratropium Bromide 0.02% unit dose by aerosol Ipratropium Bromide MDI 2 puffs by inhalation with spacer    [] Duoneb (Ipratropium + Albuterol) unit dose by aerosol Ipratropium MDI + Albuterol MDI 2 puffs by inhalation w/spacer   MDI to Aerosol [] Albuterol Sulfate MDI Albuterol Sulfate 0.083% unit dose by aerosol    [] Levalbuterol MDI 2 puffs by inhalation Levalbuterol 1.25 mg unit dose by aerosol    [] Ipratropium Bromide MDI by inhalation Ipratropium Bromide 0.02% unit dose by aerosol    [] Combivent (Ipratropium + Albuterol) MDI by inhalation Duoneb (Ipratropium + Albuterol) unit dose by aerosol   Treatment Assessment [Frequency/Schedule]:  Change frequency to: _______No Changes___________________________________________per Protocol, P&T, MEC      Points 0 1 2 3 4   Pulmonary Status  Non-Smoker  []   Smoking history   < 20 pack years  []   Smoking history  ? 20 pack years  [x]   Pulmonary Disorder  (acute or chronic)  []   Severe or Chronic w/ Exacerbation  []     Surgical Status No [x]   Surgeries     General []   Surgery Lower []   Abdominal Thoracic or []   Upper Abdominal Thoracic with  PulmonaryDisorder  []     Chest X-ray Clear/Not  Ordered     []  Chronic Changes  Results Pending  []  Infiltrates, atelectasis, pleural effusion, or edema  [x]  Infiltrates in more than one lobe []  Infiltrate + Atelectasis, &/or pleural effusion  []    Respiratory Pattern Regular,  RR = 12-20 [x]  Increased,  RR = 21-25 []  JAMA, irregular,  or RR = 26-30 []  Decreased FEV1  or RR = 31-35 []  Severe SOB, use  of accessory muscles, or RR ? 35  []    Mental Status Alert, oriented,  Cooperative [x]  Confused but Follows commands []  Lethargic or unable to follow commands []  Obtunded  []  Comatose  []    Breath Sounds Clear to  auscultation  []  Decreased unilaterally or  in bases only [x]  Decreased  bilaterally  []  Crackles or intermittent wheezes []  Wheezes []    Cough Strong, Spontan., & nonproductive [x]  Strong,  spontaneous, &  productive []  Weak,  Nonproductive []  Weak, productive or  with wheezes []  No spontaneous  cough or may require suctioning []    Level of Activity Ambulatory [x]  Ambulatory w/ Assist  []  Non-ambulatory []  Paraplegic []  Quadriplegic []    Total    Score:___5____     Triage Score:___5_____      Tri       Triage:     1. (>20) Freq: Q3    2. (16-20) Freq: Q4   3. (11-15) Freq: QID & Albuterol Q2 PRN    4. (6-10) Freq: TID & Albuterol Q2 PRN    5. (0-5) Freq Q4prn

## 2020-12-03 NOTE — PROGRESS NOTES
Progress Note  Patient: Svitlana Aguilar  Unit/Bed: Q681/P670-90  YOB: 1950  MRN: [id number]  Acct: [de-identified]   Admitting Diagnosis: Anginal equivalent [facility]) [I20.8]  NSTEMI (non-ST elevated myocardial infarction) [facility]) [I21.4]  Date:  12/1/2020  Hospital Day: 1    Chief Complaint:  Shortness of breath with exertion    Subjective      12/3/20: Patient underwent left heart catheterization with Dr. Jerad Madison yesterday which revealed patent previously placed LAD, circumflex and RCA stents for which medical therapy advised. Internal medicine was consulted yesterday for evaluation of possible pneumonia as per chest x-ray however felt to have low suspicion of pneumonia at that time. Pulmonary was consulted for further evaluation of shortness of breath complaints. Overnight and early this morning, patient has been febrile with temperature as high as 102 °F.  Blood cultures checked in ER are negative to date. Covid testing negative x2. Initial chest x-ray on 12/1/2020 showed patchy area of increased opacity in the right midlung zone suspicious for small infiltrate but no repeat x-ray has been ordered. Also, patient with recurrent episode of A. fib RVR early this morning with heart rate as high as 160s to 170s range but later converted back to sinus rhythm. Currently on telemetry, patient is A. fib with heart rates 100s to 110s with occasional to frequent PVCs. Patient is resting comfortably in bed and in no acute distress. Exhibiting mild conversational dyspnea. While ambulating to the bathroom earlier this afternoon, patient experienced mild shortness of breath. He is currently on full dose anticoagulation with Lovenox 1 mg/kg subcu twice daily as INR is subtherapeutic at 1.2 this morning. Coumadin was resumed post cath yesterday. Pulmonary evaluation pending. 12/2/20:  This is a very pleasant 72-year-old  male with past medical history significant for coronary artery disease status post multiple PCI with most recent PCI with drug-eluting stent of the ostial LAD on 1/12/2019 and proximal to mid circumflex on 1/31/2019, hypertension, dyslipidemia, rheumatoid arthritis, history of paroxysmal atrial fibrillation, history of DVT and PE, chronic anticoagulation with Coumadin, history of GI bleed secondary to duodenal ulcer, former tobacco abuse and multiple medical problems who presented to the emergency room yesterday with chief complaint of shortness of breath with exertion. Over the past 2 weeks or so patient's been experiencing progressively worsening shortness of breath with less and less activity. He states on Saturday he walked from his bedroom to the bathroom and was gasping for breath. He has slight cough at times. On Saturday he did have a fever of 102 °F at home. He presented to [facility] the ER on 11/28/2020 and again on 11/29/2020 due to these above-noted symptoms and Covid test was completed and negative and both times was discharged home. He followed up with his PCP yesterday and due to concerns for cardiac etiology he was sent to [facility] ER for further evaluation.     On presentation to the emergency room, blood pressure 138/80, heart rate 94, respiratory rate 20, pulse ox 94%, temperature 98.6 °F.  Sodium low 131, potassium elevated 5.2, chloride 99, total CO2 of 20, BUN elevated at 33, creatinine elevated 1.23, GFR low at 58.5, glucose 125. Magnesium low at 1.4. Lactic acid elevated at 3.4. Troponin elevated at 0.141. AST mildly elevated 45, ALT 33. TSH low at 0.420 and free T4 low at 0.76. WBC 8.0, hemoglobin low at 9.0, hematocrit low at 27.9, platelets 412. INR therapeutic at 2.7. D-dimer mildly elevated 0.83. Blood cultures checked and pending. Initial Covid test negative.   CTA of the chest completed on 12/1/2020 revealed no evidence of pulmonary embolism, emphysema, dependent subsegmental atelectatic changes right greater than left with corresponding signs of air trapping greatest in the right lung. Chest x-ray revealed patchy area of increased opacity in the right midlung zone suspicious for small infiltrate. EKG revealed sinus rhythm with no acute ischemic changes. He was admitted for further evaluation.     At time of evaluation today, he is seen on 5 W. resting comfortably and in no acute distress. Covid precautions in place and second Covid test pending this afternoon. Initial Covid test was negative. His initial troponin in the ER was elevated at 0.141 and repeat troponin pending this morning. proBNP only 734. Repeat BMP pending. He is hemodynamically stable. On telemetry he is sinus rhythm to sinus tachycardia with heart rates 90s to 100s. Telemetry alarms reviewed show brief episode of PA fib with RVR with heart rate in the 140s yesterday evening between 2054 and 2057. He is in no distress at this time. His Coumadin is currently on hold and will plan to initiate anticoagulation with Lovenox when INR less than 2 while Coumadin remains on hold. Per patient, he has chronic anemia with hemoglobin typically around 9. No overt signs of bleeding although his stools are always dark in color as he takes iron supplements. Review of Systems:   Review of Systems   Constitutional: Positive for fever. Negative for activity change. HENT: Negative for congestion. Respiratory: Positive for shortness of breath. Negative for chest tightness. Cardiovascular: Negative for chest pain, palpitations and leg swelling. Gastrointestinal: Negative for abdominal pain, nausea and vomiting. Genitourinary: Negative for difficulty urinating. Musculoskeletal: Negative for arthralgias. Skin: Negative for color change, pallor and rash. Neurological: Negative for dizziness, syncope and light-headedness. Psychiatric/Behavioral: Negative for agitation and behavioral problems.          Physical Examination:    BP (!) 159/94   Pulse 125   Temp 102.7 °F (39.3 °C) (Oral)   Resp 18   Ht 5' 10\" (1.778 m)   Wt 202 lb 11.2 oz (91.9 kg)   SpO2 90%   BMI 29.08 kg/m²    Physical Exam  Constitutional:       Appearance: Normal appearance. He is obese. HENT:      Head: Normocephalic and atraumatic. Neck:      Musculoskeletal: Normal range of motion and neck supple. Cardiovascular:      Rate and Rhythm: Tachycardia present. Rhythm irregularly irregular. Pulmonary:      Breath sounds: No wheezing, rhonchi or rales. Comments: Mild conversational dyspnea  Abdominal:      Palpations: Abdomen is soft. Tenderness: There is no abdominal tenderness. Musculoskeletal: Normal range of motion. Right lower leg: No edema. Left lower leg: No edema. Skin:     General: Skin is warm and dry. Neurological:      General: No focal deficit present. Mental Status: He is alert and oriented to person, place, and time. Cranial Nerves: No cranial nerve deficit.    Psychiatric:         Mood and Affect: Mood normal.         Behavior: Behavior normal.         LABS:  CBC:   Lab Results   Component Value Date    WBC 4.4 12/03/2020    RBC 3.16 12/03/2020    RBC 4.38 04/26/2012    HGB 8.2 12/03/2020    HCT 25.5 12/03/2020    MCV 80.7 12/03/2020    MCH 25.8 12/03/2020    MCHC 32.0 12/03/2020    RDW 29.8 12/03/2020     12/03/2020    MPV 8.3 04/26/2012     CBC with Differential:   Lab Results   Component Value Date    WBC 4.4 12/03/2020    RBC 3.16 12/03/2020    RBC 4.38 04/26/2012    HGB 8.2 12/03/2020    HCT 25.5 12/03/2020     12/03/2020    MCV 80.7 12/03/2020    MCH 25.8 12/03/2020    MCHC 32.0 12/03/2020    RDW 29.8 12/03/2020    LYMPHOPCT 1.6 12/01/2020    MONOPCT 0.4 12/01/2020    BASOPCT 0.3 12/01/2020    MONOSABS 0.0 12/01/2020    LYMPHSABS 0.1 12/01/2020    EOSABS 0.0 12/01/2020    BASOSABS 0.0 12/01/2020     CMP:    Lab Results   Component Value Date     12/02/2020    K 4.2 12/02/2020    K 4.6 08/30/2019     12/02/2020    CO2 21 12/02/2020    BUN 24 12/02/2020    CREATININE 0.87 12/02/2020    GFRAA >60.0 12/02/2020    LABGLOM >60.0 12/02/2020    GLUCOSE 101 12/02/2020    GLUCOSE 95 04/26/2012    PROT 6.0 12/01/2020    LABALBU 3.0 12/01/2020    LABALBU 4.5 04/26/2012    CALCIUM 9.1 12/02/2020    BILITOT 0.5 12/01/2020    ALKPHOS 38 12/01/2020    AST 45 12/01/2020    ALT 33 12/01/2020     BMP:    Lab Results   Component Value Date     12/02/2020    K 4.2 12/02/2020    K 4.6 08/30/2019     12/02/2020    CO2 21 12/02/2020    BUN 24 12/02/2020    LABALBU 3.0 12/01/2020    LABALBU 4.5 04/26/2012    CREATININE 0.87 12/02/2020    CALCIUM 9.1 12/02/2020    GFRAA >60.0 12/02/2020    LABGLOM >60.0 12/02/2020    GLUCOSE 101 12/02/2020    GLUCOSE 95 04/26/2012     Magnesium:    Lab Results   Component Value Date    MG 1.5 12/03/2020     Troponin:    Lab Results   Component Value Date    TROPONINI 0.065 12/02/2020       Radiology:  Cta Chest W Wo Contrast    Result Date: 12/1/2020  EXAM: CT SCAN OF THE THORAX WITH CONTRAST/PE PROTOCOL/CTA CHEST COMPARISON: CHEST RADIOGRAPHS, DECEMBER 1, 2020, APRIL 2, 2019. REASON FOR EXAMINATION:  SHORTNESS OF BREATH FOR SEVERAL WEEKS TECHNIQUE: Helical CTA was performed through the chest utilizing 100 mL of Isovue 300 intravenous contrast medium. .  Images were obtained with bolus tracking in order to opacify the pulmonary arteries. Thick section coronal MIP 3D reconstructions were performed on a separate workstation. FINDINGS:  No intraluminal filling defects, pulmonary arterial tree. Thoracic aorta normal in course and caliber. Cardiac size normal. No pericardial effusion. Coronary calcification identified. No hilar, mediastinal, or axillary lymph node enlargement. Limited imaging upper abdomen shows small hiatal hernia. Adrenal glands without anomaly. Right lung shows emphysematous change, with fibrous scarring at right lung apex.  Dependent subsegmental atelectatic change, right upper lobe, with mosaic mitral valve structure and function. Trace (1+) mitral regurgitation is present. Tricuspid Valve  There is Trace ( 1 +) tricuspid regurgitation with estimated RVSP of 19 mm  Hg. Aortic Valve  Normal aortic valve structure and function. Pulmonic Valve  Normal pulmonic valve structure and function. Pericardial Effusion  No evidence of pericardial effusion. Pleural Effusion  No evidence of pleural effusion. Aorta \ Miscellaneous  Miscellaneous normal findings were found. EKG 12/1/20: SR 89, no acute ischemic changes, QTc 401ms     Telemetry 12/2/20: SR/ST 90s-100s; PAF RVR with HR 140s yesterday between [id number]  Telemetry 12/3/20: SR 80s, occasional PVCs; PA-fib RVR early AM around 8:06      Assessment:    Active Hospital Problems    Diagnosis Date Noted    NSTEMI (non-ST elevated myocardial infarction) ([facility] [address] [I21.4] 12/02/2020    Anginal equivalent ([facility] [address] [I20.8] 12/01/2020    Acute chest pain [R07.9] 12/01/2020     1. NSTEMI s/p [facility] with patent previously placed stents--med Rx  2. Dyspnea on exertion--secondary to A-fib RVR +/- COPD vs other  3. Hx CAD s/p PCI of RCA in 2018 at [facility] and PCI QIAN ostial LAD 1/12/19 and prox/mid circ 1/31/19  4. PA-fib RVR  5. Hx PE  6. Chronic anticoagulation with coumadin  7. Rheumatoid arthritis  8. HTN  9. Dyslipidemia  10. ? RML pneumonia per CXR  11. Hyperkalemia--resolved  12. Hypomagnesemia--mag oxide 400mg BID added  13. Chronic anemia  14. Hx GIB/duodenal ulcer  15. Fever of unknown origin     Plan:  1. Maximize medical therapy- discontinue aspirin given Hx GIB/anemia, Plavix 75 mg p.o. daily, titrate Lopressor 100 mg p.o. twice daily for improved rate control, add Cardizem 30 mg p.o. every 6 hours, Lipitor 80 mg tablet p.o. daily, lisinopril 10 mg p.o. daily, all other home medications as ordered, sublingual nitroglycerin as needed for chest pain  2. [address] with coumadin resumed following C yesterday. Goal INR 2-3.  Bridge with lovenox 1mg/KG SQ BID until INR >2  3. Cardiac diet recommended  4. Monitor H&H closely given anemia and Hx GIB as patient on plavix and anticoagulation  5. Monitor on telemetry for any tachycardia or bradycardia arrhythmias  6. Maintain potassium greater than 4, magnesium greater than 2  7. GI/DVT prophylaxis  8. Initial Covid test negative. Repeat Covid testing pending this afternoon  9. Internal medicine recommendations regarding possible infiltrate in right middle lobe per chest x-ray completed yesterday  10. Coronary evaluation per Dr. Melani Oreilly. Given NSTEMI and symptoms concerning for angina with multiple presentations to the emergency room for similar symptoms and history of CAD status post previous PCI, will likely need cardiac catheterization for definitive diagnosis prior to discharge  11.  Further recommendations to follow      Electronically signed by MARIO ALBERTO Najera on 12/3/2020 at 12:58 PM

## 2020-12-03 NOTE — CONSULTS
Infectious Disease     Patient Name: Ngoc Lesser  Date: 12/3/2020  YOB: 1950  Medical Record Number: [id number]        Right upper lung pneumonia      History of Present Illness:    Coronary disease depression DVT myocardial infarction pulmonary emboli hyperlipidemia hypertension atrial fibrillation rheumatoid arthritis    Patient presents with shortness of breath for 2 weeks  Some nonspecific chest discomfort  No fevers chills sputum production    COVID-19 test negative  Transferred to [address]     Had increasing shortness of breath developed fevers 102.7 on 12/3/2020 with increasing heart rate    X-ray shows a faint infiltrate right upper lung on 12/1/2020  CT scan some questionable infiltrate right lung like this was in both lung bases    Blood cultures are negative so far    Procalcitonin elevated 0.3  Blood cell count 4400  COVID-19  X 2 testing negative      Currently on no antibiotics              Review of Systems   Constitutional: Negative for chills, diaphoresis, fatigue and fever. Eyes: Negative. Respiratory: Positive for cough and shortness of breath. Cardiovascular: Negative. Gastrointestinal: Negative. Genitourinary: Negative. Musculoskeletal: Negative.         Review of Systems: All 14 review of systems negative other than as stated above    Social History     Tobacco Use    Smoking status: Former Smoker     Packs/day: 1.00     Years: 50.00     Pack years: 50.00     Types: Cigarettes     Last attempt to quit: 11/16/2017     Years since quitting: 3.0    Smokeless tobacco: Never Used   Substance Use Topics    Alcohol use: No    Drug use: No         Past Medical History:   Diagnosis Date    CAD S/P percutaneous coronary angioplasty     Depression 2007    History of DVT (deep vein thrombosis)     History of non-ST elevation myocardial infarction (NSTEMI) 10/19/2018    Overview:  H POA Recent  Chest pain with sweats A CE+ Loaded with plavix  for cath with Dr Major Monterroso yesterday PCI QIAN to RCA P Continue ASA, Plavix and xarelot for 1 month then DC ASA,continue on  BB and increased lipitor dose    History of pulmonary embolism     Hyperlipidemia     Hypertension     Osteoarthritis     Paroxysmal atrial fibrillation (HCC)     RA (rheumatoid arthritis) ([address]     [facility] Dr. Rhonda Mcclellan Rheumatoid arthritis(714.0)            Past Surgical History:   Procedure Laterality Date    COLONOSCOPY N/A 4/4/2019    COLONOSCOPY performed by Liudmila Garces MD at [address]  2004    OPEN REDUCTION LEFT LEG    UPPER GASTROINTESTINAL ENDOSCOPY N/A 11/10/2018    EGD ESOPHAGOGASTRODUODENOSCOPY performed by Liudmila Garces MD at [facility] N/A 4/3/2019    EGD ESOPHAGOGASTRODUODENOSCOPY performed by Liudmila Garces MD at [facility]         No current facility-administered medications on file prior to encounter. Current Outpatient Medications on File Prior to Encounter   Medication Sig Dispense Refill    baclofen (LIORESAL) 10 MG tablet Take 10 mg by mouth 3 times daily      ferrous sulfate (IRON 325) 325 (65 Fe) MG tablet Take 325 mg by mouth daily (with breakfast)      leflunomide (ARAVA) 20 MG tablet Take 20 mg by mouth daily      pregabalin (LYRICA) 75 MG capsule Take 75 mg by mouth 2 times daily.       Tofacitinib Citrate (XELJANZ PO) Take 11 mg by mouth daily      atorvastatin (LIPITOR) 80 MG tablet Take 1 tablet by mouth daily 30 tablet 3    metoprolol tartrate (LOPRESSOR) 50 MG tablet Take 1.5 tablets by mouth 2 times daily 135 tablet 3    warfarin (COUMADIN) 4 MG tablet Take 4 mg by mouth 4mg tuesdays and fridays  2mg all other days      Cyanocobalamin (B-12 PO) Take by mouth      ezetimibe (ZETIA) 10 MG tablet Take 10 mg by mouth daily      clopidogrel (PLAVIX) 75 MG tablet Take 75 mg by mouth daily      esomeprazole Magnesium (NEXIUM) 40 MG PACK Take 40 mg by mouth daily      folic acid (FOLVITE) 1 MG tablet Take 1 mg by mouth daily      lisinopril (PRINIVIL;ZESTRIL) 10 MG tablet Take 10 mg by mouth daily      predniSONE (DELTASONE) 5 MG tablet Take 5 mg by mouth daily      VITAMIN D, CHOLECALCIFEROL, PO Take 4,000 Int'l Units by mouth daily       gabapentin (NEURONTIN) 100 MG capsule Take 1 capsule by mouth 3 times daily for 30 days. 90 capsule 0    Multiple Vitamins-Minerals (MULTIVITAMIN ADULT PO) Take by mouth      methotrexate (RHEUMATREX) 2.5 MG chemo tablet TAKE 10 TABLETS ONCE A WEEK WITH FOOD - NO ALCOHOL AND HOLD IF ON ANTIBIOTICS OR IF ILL      Coenzyme Q10 (COQ-10) 50 MG CAPS Take 50 mg by mouth 2 times daily         Allergies   Allergen Reactions    Antihistamine [Diphenhydramine Hcl] Other (See Comments)     jittery         Family History   Problem Relation Age of Onset    High Blood Pressure Father     Heart Attack Father     Coronary Art Dis Father          Physical Exam:      Physical Exam   Constitutional: He is oriented to person, place, and time. No distress. HENT:   Head: Normocephalic. Eyes: Pupils are equal, round, and reactive to light. Neck: Normal range of motion. No JVD present. No tracheal deviation present. No thyromegaly present. Cardiovascular: Normal heart sounds. No murmur heard. Pulmonary/Chest: Breath sounds normal. No respiratory distress. He has no wheezes. He has no rales. He exhibits no tenderness. Abdominal: Soft. Bowel sounds are normal. He exhibits no distension and no mass. There is no abdominal tenderness. There is no rebound and no guarding. Musculoskeletal:         General: No tenderness or edema. Lymphadenopathy:     He has no cervical adenopathy. Neurological: He is alert and oriented to person, place, and time. Skin: Skin is warm and dry. No rash noted. He is not diaphoretic. No erythema. No pallor. Blood pressure (!) 112/55, pulse 106, temperature 100.2 °F (37.9 °C), temperature source Oral, resp.  rate 20, height 5' 10\" (1.778 m), weight 202 lb 11.2 oz (91.9 kg), SpO2 93 %.       .   Lab Results   Component Value Date    WBC 4.4 (L) 12/03/2020    HGB 8.2 (L) 12/03/2020    HCT 25.5 (L) 12/03/2020    MCV 80.7 12/03/2020     12/03/2020     Lab Results   Component Value Date     12/02/2020    K 4.2 12/02/2020    K 4.6 08/30/2019     12/02/2020    CO2 21 12/02/2020    BUN 24 12/02/2020    CREATININE 0.87 12/02/2020    GLUCOSE 101 12/02/2020    GLUCOSE 95 04/26/2012    CALCIUM 9.1 12/02/2020                ASSESSMENT:  Patient Active Problem List   Diagnosis    Essential hypertension    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 ([facility])    Seasonal allergic rhinitis    Chronic sinusitis    Acute blood loss anemia    Gastrointestinal hemorrhage    Depression    Lipid disorder    Paroxysmal atrial fibrillation with RVR ([facility])    Post PTCA    Steroid-induced osteoporosis    Vitamin D deficiency    Synovitis    PE (pulmonary thromboembolism) ([facility])    History of non-ST elevation myocardial infarction (NSTEMI)    Long-term use of high-risk medication    Methotrexate, long term, current use    Nicotine dependence, uncomplicated    Ventricular tachycardia ([facility])    GI bleed    Anemia due to acute blood loss    Gastrointestinal hemorrhage with melena    Herpes zoster with complication    Anginal equivalent ([facility])    Acute chest pain    NSTEMI (non-ST elevated myocardial infarction) ([facility])         PLAN:    Possible right lung pneumonia    Obtain sedimentation rate CRP blood cultures urine culture chest x-ray  Start Rocephin

## 2020-12-03 NOTE — DISCHARGE SUMMARY
Cardiology Discharge Summary      Patient Identification:  Corona Contreras  : 1950  MRN: [id number]   Account: [de-identified]     Admit date: 2020  Discharge date: 12/3/20  Attending provider: Jared Abdullahi MD        Primary care provider: Kaylyn Carlisle MD     Admission Diagnoses:  Unstable angina        Discharge Diagnoses: Active Hospital Problems    Diagnosis Date Noted    NSTEMI (non-ST elevated myocardial infarction) ([facility] [I21.4] 2020    Anginal equivalent ([facility] [I20.8] 2020    Acute chest pain [R07.9] 2020          Hospital Course:   Corona Contreras is a79 y.o. male admitted to [facility] on 2020 for unstable angina. Patient has a previous history of multivessel angioplasty stent. Patient needed to be given 10 mg IV vitamin K. Underwent cardiac catheterization. Showed patent stents. Coumadin will be resumed. He can be discharged home and follow-up with me as an outpatient          Procedures:   Left heart cath coronary angiogram     Consults:       Examination:  BP (!) 159/94   Pulse 125   Temp 102.7 °F (39.3 °C) (Oral)   Resp 18   Ht 5' 10\" (1.778 m)   Wt 202 lb 11.2 oz (91.9 kg)   SpO2 90%   BMI 29.08 kg/m²    Physical Exam  Constitutional:       Appearance: He is well-developed. HENT:      Head: Atraumatic. Eyes:      Conjunctiva/sclera: Conjunctivae normal.      Pupils: Pupils are equal, round, and reactive to light. Neck:      Thyroid: No thyromegaly. Vascular: No JVD. Trachea: No tracheal deviation. Cardiovascular:      Rate and Rhythm: Normal rate and regular rhythm. Heart sounds: No murmur. No friction rub. No gallop. Pulmonary:      Effort: No respiratory distress. Breath sounds: No wheezing or rales. Chest:      Chest wall: No tenderness. Abdominal:      General: There is no distension. Palpations: Abdomen is soft.  There is no mass.      Tenderness: There is no abdominal tenderness. There is no guarding or rebound. Musculoskeletal: Normal range of motion. Lymphadenopathy:      Cervical: No cervical adenopathy. Skin:     General: Skin is warm. Neurological:      Mental Status: He is alert and oriented to person, place, and time. Medications:  Current Discharge Medication List      CONTINUE these medications which have NOT CHANGED    Details   baclofen (LIORESAL) 10 MG tablet Take 10 mg by mouth 3 times daily      ferrous sulfate (IRON 325) 325 (65 Fe) MG tablet Take 325 mg by mouth daily (with breakfast)      leflunomide (ARAVA) 20 MG tablet Take 20 mg by mouth daily      pregabalin (LYRICA) 75 MG capsule Take 75 mg by mouth 2 times daily. Tofacitinib Citrate (XELJANZ PO) Take 11 mg by mouth daily      atorvastatin (LIPITOR) 80 MG tablet Take 1 tablet by mouth daily  Qty: 30 tablet, Refills: 3    Associated Diagnoses: Hyperlipidemia, unspecified hyperlipidemia type      metoprolol tartrate (LOPRESSOR) 50 MG tablet Take 1.5 tablets by mouth 2 times daily  Qty: 135 tablet, Refills: 3      warfarin (COUMADIN) 4 MG tablet Take 4 mg by mouth 4mg tuesdays and fridays  2mg all other days      Cyanocobalamin (B-12 PO) Take by mouth      ezetimibe (ZETIA) 10 MG tablet Take 10 mg by mouth daily      clopidogrel (PLAVIX) 75 MG tablet Take 75 mg by mouth daily      esomeprazole Magnesium (NEXIUM) 40 MG PACK Take 40 mg by mouth daily      folic acid (FOLVITE) 1 MG tablet Take 1 mg by mouth daily      lisinopril (PRINIVIL;ZESTRIL) 10 MG tablet Take 10 mg by mouth daily      predniSONE (DELTASONE) 5 MG tablet Take 5 mg by mouth daily      VITAMIN D, CHOLECALCIFEROL, PO Take 4,000 Int'l Units by mouth daily       gabapentin (NEURONTIN) 100 MG capsule Take 1 capsule by mouth 3 times daily for 30 days.   Qty: 90 capsule, Refills: 0      Multiple Vitamins-Minerals (MULTIVITAMIN ADULT PO) Take by mouth      methotrexate (RHEUMATREX) 2.5 MG chemo tablet TAKE 10 TABLETS ONCE A WEEK WITH FOOD - NO ALCOHOL AND HOLD IF ON ANTIBIOTICS OR IF ILL      Coenzyme Q10 (COQ-10) 50 MG CAPS Take 50 mg by mouth 2 times daily             Significant Diagnostics:   Radiology: Cta Chest W Wo Contrast    Result Date: 12/1/2020  EXAM: CT SCAN OF THE THORAX WITH CONTRAST/PE PROTOCOL/CTA CHEST COMPARISON: CHEST RADIOGRAPHS, DECEMBER 1, 2020, APRIL 2, 2019. REASON FOR EXAMINATION:  SHORTNESS OF BREATH FOR SEVERAL WEEKS TECHNIQUE: Helical CTA was performed through the chest utilizing 100 mL of Isovue 300 intravenous contrast medium. .  Images were obtained with bolus tracking in order to opacify the pulmonary arteries. Thick section coronal MIP 3D reconstructions were performed on a separate workstation. FINDINGS:  No intraluminal filling defects, pulmonary arterial tree. Thoracic aorta normal in course and caliber. Cardiac size normal. No pericardial effusion. Coronary calcification identified. No hilar, mediastinal, or axillary lymph node enlargement. Limited imaging upper abdomen shows small hiatal hernia. Adrenal glands without anomaly. Right lung shows emphysematous change, with fibrous scarring at right lung apex. Dependent subsegmental atelectatic change, right upper lobe, with mosaic morphology identified. Dependent subsegmental atelectatic change right lower lobe. Focal area of bullous change right lung base. No nodules, masses, pleural effusion. Left lung shows emphysematous change with fibrous scarring at left lung apex. Dependent subsegmental atelectatic change, left lung base. No osteoblastic, no osteolytic lesions. No CT evidence pulmonary embolism. Emphysema. Dependent subsegmental atelectatic changes discussed, right greater than left, with corresponding signs of air trapping, greatest in right lung.  All CT scans at this facility use dose modulation, iterative reconstruction, and/or weight based dosing when appropriate to reduce radiation dose to as low as reasonably achievable. Xr Chest Portable    Result Date: 12/1/2020  EXAMINATION: XR CHEST PORTABLE. DATE AND TIME:12/1/2020 12:12 PM CLINICAL HISTORY: SHORTNESS OF BREATH   SOB  COMPARISONS: FEBRUARY 27, 2019  FINDINGS: Patchy area of increased opacity in the right midlung zone suspicious  for small infiltrate. A Chest x-ray is insensitive in mild or early COVID-19  infection. Furthermore the specificity of chest x-ray findings for COVID-19 pneumonia is not established. Remaining lung fields clear. Heart and mediastinum within normal limits. SUSPECT RIGHT UPPER LUNG ZONE INFILTRATE.        Labs:   Recent Results (from the past 72 hour(s))   Comprehensive Metabolic Panel    Collection Time: 12/01/20 12:00 PM   Result Value Ref Range    Sodium 131 (L) 135 - 144 mEq/L    Potassium 5.2 (H) 3.4 - 4.9 mEq/L    Chloride 99 95 - 107 mEq/L    CO2 20 20 - 31 mEq/L    Anion Gap 12 9 - 15 mEq/L    Glucose 125 (H) 70 - 99 mg/dL    BUN 33 (H) 8 - 23 mg/dL    CREATININE 1.23 (H) 0.70 - 1.20 mg/dL    GFR Non-African American 58.5 (L) >60    GFR  >60.0 >60    Calcium 8.6 8.5 - 9.9 mg/dL    Total Protein 6.0 (L) 6.3 - 8.0 g/dL    Alb 3.0 (L) 3.5 - 4.6 g/dL    Total Bilirubin 0.5 0.2 - 0.7 mg/dL    Alkaline Phosphatase 38 35 - 104 U/L    ALT 33 0 - 41 U/L    AST 45 (H) 0 - 40 U/L    Globulin 3.0 2.3 - 3.5 g/dL   CBC Auto Differential    Collection Time: 12/01/20 12:00 PM   Result Value Ref Range    WBC 8.0 4.8 - 10.8 K/uL    RBC 3.47 (L) 4.70 - 6.10 M/uL    Hemoglobin 9.0 (L) 14.0 - 18.0 g/dL    Hematocrit 27.9 (L) 42.0 - 52.0 %    MCV 80.6 80.0 - 100.0 fL    MCH 26.0 (L) 27.0 - 31.3 pg    MCHC 32.2 (L) 33.0 - 37.0 %    RDW 29.4 (H) 11.5 - 14.5 %    Platelets 575 849 - 188 K/uL    PLATELET SLIDE REVIEW Normal     Path Consult Yes     Neutrophils % 97.2 %    Lymphocytes % 1.6 %    Monocytes % 0.4 %    Eosinophils % 0.5 %    Basophils % 0.3 %    Neutrophils Absolute 7.7 (H) 1.4 - 6.5 K/uL    Lymphocytes Absolute 0.1 (L) 1.0 - 4.8 K/uL    Monocytes Absolute 0.0 (L) 0.2 - 0.8 K/uL    Eosinophils Absolute 0.0 0.0 - 0.7 K/uL    Basophils Absolute 0.0 0.0 - 0.2 K/uL    Anisocytosis 2+     Polychromasia 1+     Poikilocytes 2+     Schistocytes 1+     Acanthocytes 1+     Ovalocytes 1+    Lactic Acid, Plasma    Collection Time: 12/01/20 12:00 PM   Result Value Ref Range    Lactic Acid 3.4 (HH) 0.5 - 2.2 mmol/L   Lipase    Collection Time: 12/01/20 12:00 PM   Result Value Ref Range    Lipase 42 12 - 95 U/L   Magnesium    Collection Time: 12/01/20 12:00 PM   Result Value Ref Range    Magnesium 1.4 (L) 1.7 - 2.4 mg/dL   Troponin    Collection Time: 12/01/20 12:00 PM   Result Value Ref Range    Troponin 0.141 (HH) 0.000 - 0.010 ng/mL   TSH without Reflex    Collection Time: 12/01/20 12:00 PM   Result Value Ref Range    TSH 0.420 (L) 0.440 - 3.860 uIU/mL   T4, Free    Collection Time: 12/01/20 12:00 PM   Result Value Ref Range    T4 Free 0.76 (L) 0.84 - 1.68 ng/dL   Protime-INR    Collection Time: 12/01/20 12:00 PM   Result Value Ref Range    Protime 28.6 (H) 12.3 - 14.9 sec    INR 2.7    D-Dimer, Quantitative    Collection Time: 12/01/20 12:00 PM   Result Value Ref Range    D-Dimer, Quant 0.83 (HH) 0.00 - 0.50 mg/L FEU   PATH CONSULT HEMATOLOGY    Collection Time: 12/01/20 12:00 PM   Result Value Ref Range    Path Consult Reviewed    EKG 12 Lead - Chest Pain    Collection Time: 12/01/20 12:08 PM   Result Value Ref Range    Ventricular Rate 89 BPM    Atrial Rate 89 BPM    P-R Interval 142 ms    QRS Duration 78 ms    Q-T Interval 330 ms    QTc Calculation (Bazett) 401 ms    P Axis 30 degrees    R Axis 77 degrees    T Axis 36 degrees   COVID-19    Collection Time: 12/01/20 12:27 PM   Result Value Ref Range    SARS-CoV-2, NAAT Not Detected Not Detected   POCT Arterial    Collection Time: 12/01/20 12:35 PM   Result Value Ref Range    POC Sodium 129 (L) 136 - 145 mEq/L    POC Potassium 4.2 3.5 - 5.1 mEq/L    POC Chloride 102 99 - 110 mEq/L POC Glucose 133 (H) 60 - 115 mg/dl    POC Creatinine 1.3 0.8 - 1.3 mg/dL    GFR Non-African American 55 (A) >60    GFR African American >60 >60    Calcium, Ion 0.94 (L) 1.12 - 1.32 mmol/L    pH, Arterial 7.542 (H) 7.350 - 7.450    pCO2, Arterial 24 (L) 35 - 45 mm Hg    pO2, Arterial 75 (HH) 75 - 108 mm Hg    HCO3, Arterial 20.9 (L) 21.0 - 29.0 mmol/L    Base Excess, Arterial -2 -3 - 3    O2 Sat, Arterial 97 (HH) 93 - 100 %    TCO2, Arterial 22 22 - 29    Lactate 2.71 (H) 0.40 - 2.00 mmol/L    POC Hematocrit 24 (L) 41 - 53 %    Hemoglobin 8.3 (L) 13.5 - 17.5 gm/dL    Sample Type ART     Performed on SEE BELOW    Culture, Blood 1    Collection Time: 12/01/20 12:39 PM    Specimen: Blood   Result Value Ref Range    Blood Culture, Routine       No Growth to date. Any change in status will be called. Culture, Blood 2    Collection Time: 12/01/20 12:39 PM    Specimen: Blood   Result Value Ref Range    Culture, Blood 2       No Growth to date. Any change in status will be called.    POCT Glucose    Collection Time: 12/01/20  8:28 PM   Result Value Ref Range    POC Glucose 209 (H) 60 - 115 mg/dl    Performed on ACCU-CHEK    Brain Natriuretic Peptide    Collection Time: 12/02/20  7:28 AM   Result Value Ref Range    Pro- pg/mL   Magnesium    Collection Time: 12/02/20  7:28 AM   Result Value Ref Range    Magnesium 1.9 1.7 - 2.4 mg/dL   Lipid panel - fasting    Collection Time: 12/02/20  7:28 AM   Result Value Ref Range    Cholesterol, Total 136 0 - 199 mg/dL    Triglycerides 247 (H) 0 - 150 mg/dL    HDL 38 (L) 40 - 59 mg/dL    LDL Calculated 49 0 - 129 mg/dL   SPECIMEN REJECTION    Collection Time: 12/02/20 11:35 AM   Result Value Ref Range    Rejected Test cbc     Reason for Rejection see below    TROPONIN    Collection Time: 12/02/20 12:02 PM   Result Value Ref Range    Troponin 0.067 (HH) 0.000 - 0.010 ng/mL   BASIC METABOLIC PANEL    Collection Time: 12/02/20 12:02 PM   Result Value Ref Range    Sodium 134 (L) 135 - 144 mEq/L    Potassium 4.2 3.4 - 4.9 mEq/L    Chloride 100 95 - 107 mEq/L    CO2 21 20 - 31 mEq/L    Anion Gap 13 9 - 15 mEq/L    Glucose 101 (H) 70 - 99 mg/dL    BUN 24 (H) 8 - 23 mg/dL    CREATININE 0.87 0.70 - 1.20 mg/dL    GFR Non-African American >60.0 >60    GFR  >60.0 >60    Calcium 9.1 8.5 - 9.9 mg/dL   PROTIME-INR    Collection Time: 12/02/20 12:02 PM   Result Value Ref Range    Protime 25.0 (H) 12.3 - 14.9 sec    INR 2.3    CBC    Collection Time: 12/02/20 12:02 PM   Result Value Ref Range    WBC 6.6 4.8 - 10.8 K/uL    RBC 3.46 (L) 4.70 - 6.10 M/uL    Hemoglobin 8.8 (L) 14.0 - 18.0 g/dL    Hematocrit 27.4 (L) 42.0 - 52.0 %    MCV 79.2 (L) 80.0 - 100.0 fL    MCH 25.6 (L) 27.0 - 31.3 pg    MCHC 32.3 (L) 33.0 - 37.0 %    RDW 30.3 (H) 11.5 - 14.5 %    Platelets 790 367 - 320 K/uL   PROCALCITONIN    Collection Time: 12/02/20 12:02 PM   Result Value Ref Range    Procalcitonin 0.30 (H) 0.00 - 0.15 ng/mL   COVID-19    Collection Time: 12/02/20 12:55 PM   Result Value Ref Range    SARS-CoV-2, NAAT Not Detected Not Detected   Troponin    Collection Time: 12/02/20  3:36 PM   Result Value Ref Range    Troponin 0.065 (HH) 0.000 - 0.010 ng/mL   Protime-INR    Collection Time: 12/02/20  3:36 PM   Result Value Ref Range    Protime 20.2 (H) 12.3 - 14.9 sec    INR 1.7    EKG 12 lead    Collection Time: 12/03/20 12:45 AM   Result Value Ref Range    Ventricular Rate 92 BPM    Atrial Rate 92 BPM    P-R Interval 148 ms    QRS Duration 82 ms    Q-T Interval 314 ms    QTc Calculation (Bazett) 388 ms    P Axis 61 degrees    R Axis 77 degrees    T Axis 44 degrees   PROTIME-INR    Collection Time: 12/03/20  6:14 AM   Result Value Ref Range    Protime 15.6 (H) 12.3 - 14.9 sec    INR 1.2    MAGNESIUM    Collection Time: 12/03/20  6:14 AM   Result Value Ref Range    Magnesium 1.5 (L) 1.7 - 2.4 mg/dL   CBC    Collection Time: 12/03/20  6:14 AM   Result Value Ref Range    WBC 4.4 (L) 4.8 - 10.8 K/uL    RBC 3.16 (L) 4.70 - 6.10 M/uL    Hemoglobin 8.2 (L) 14.0 - 18.0 g/dL    Hematocrit 25.5 (L) 42.0 - 52.0 %    MCV 80.7 80.0 - 100.0 fL    MCH 25.8 (L) 27.0 - 31.3 pg    MCHC 32.0 (L) 33.0 - 37.0 %    RDW 29.8 (H) 11.5 - 14.5 %    Platelets 056 112 - 765 K/uL              Follow-up visits:   Diana Dennis MD  [address] [address]  [phone]    In 2 weeks         Assessment:  Active Hospital Problems    Diagnosis Date Noted    NSTEMI (non-ST elevated myocardial infarction) ([address] [I21.4] 12/02/2020    Anginal equivalent ([address] [I20.8] 12/01/2020    Acute chest pain [R07.9] 12/01/2020         Plan:   1.   Discharge home        Electronically signed by Diana Dennis, [address] [facility] 12/3/2020 at 11:22 AM

## 2020-12-03 NOTE — PROGRESS NOTES
Hospitalist Progress Note      PCP: Geetha Patel MD    Date of Admission: 12/1/2020    Chief Complaint:      Subjective: :  Pt seen and examined at bedside. Fever noted yesterday evening. ID consulted, infectious workup initiated and cultures pending. Pt started on IV rocephin. Procal 0.3. Pt reports generalized fatigue, no new complaints. Pt follows with Rheumatology for his RA and denies new  swelling however reports generalized fatigue and shoulder pain, knee pain. Medications:  Reviewed    Infusion Medications   Scheduled Medications    metoprolol tartrate  100 mg Oral BID    dilTIAZem  30 mg Oral 4 times per day    cefTRIAXone (ROCEPHIN) IV  1 g Intravenous Q24H    baclofen  10 mg Oral TID    clopidogrel  75 mg Oral Daily    CoQ-10  50 mg Oral BID    pantoprazole  40 mg Oral Daily    ezetimibe  10 mg Oral Daily    ferrous sulfate  325 mg Oral Daily with breakfast    folic acid  1 mg Oral Daily    gabapentin  100 mg Oral TID    leflunomide  20 mg Oral Daily    lisinopril  10 mg Oral Daily    predniSONE  5 mg Oral Daily    pregabalin  75 mg Oral BID    Tofacitinib Citrate ER  11 mg Oral Daily    magnesium oxide  400 mg Oral BID    sodium chloride flush  10 mL Intravenous 2 times per day    enoxaparin  1 mg/kg Subcutaneous BID    sodium chloride flush  10 mL Intravenous 2 times per day    atorvastatin  80 mg Oral Nightly     PRN Meds: sodium chloride flush, albuterol sulfate HFA, metoprolol, sodium chloride flush, acetaminophen **OR** acetaminophen, polyethylene glycol, promethazine **OR** ondansetron, nitroGLYCERIN      Intake/Output Summary (Last 24 hours) at 12/3/2020 1454  Last data filed at 12/3/2020 1030  Gross per 24 hour   Intake 1025 ml   Output 675 ml   Net 350 ml       Exam:    BP (!) 112/55   Pulse 106   Temp 100.2 °F (37.9 °C) (Oral)   Resp 20   Ht 5' 10\" (1.778 m)   Wt 202 lb 11.2 oz (91.9 kg)   SpO2 93%   BMI 29.08 kg/m²     GEN: Alert and oriented. NAD, well nourished  HEENT: Normocephalic, atraumatic. KAREN, Neck supple. Resp: Diminished, no rhonchi, No respiratory distress  Cardio: RRR. Abd: Soft, nondistended. NTTP. BS present  Ext: No erythema or edema. LE NTTP  MSK: No joint erythema or edema          Labs:   Recent Labs     12/01/20  1200 12/01/20  1235 12/02/20  1202 12/03/20  0614   WBC 8.0  --  6.6 4.4*   HGB 9.0* 8.3* 8.8* 8.2*   HCT 27.9*  --  27.4* 25.5*     --  186 160     Recent Labs     12/01/20  1200 12/01/20  1235 12/02/20  1202   *  --  134*   K 5.2*  --  4.2   CL 99  --  100   CO2 20  --  21   BUN 33*  --  24*   CREATININE 1.23* 1.3 0.87   CALCIUM 8.6  --  9.1     Recent Labs     12/01/20  1200   AST 45*   ALT 33   BILITOT 0.5   ALKPHOS 38     Recent Labs     12/02/20  1202 12/02/20  1536 12/03/20  0614   INR 2.3 1.7 1.2     Recent Labs     12/01/20  1200 12/02/20  1202 12/02/20  1536   TROPONINI 0.141* 0.067* 0.065*       Urinalysis:      Lab Results   Component Value Date    NITRU Negative 11/23/2017    WBCUA 0-2 11/23/2017    RBCUA 3-5 11/23/2017    BLOODU MODERATE 11/23/2017    SPECGRAV 1.020 11/23/2017    GLUCOSEU Negative 11/23/2017       Radiology:  CTA CHEST W WO CONTRAST   Final Result      No CT evidence pulmonary embolism. Emphysema. Dependent subsegmental atelectatic changes discussed, right greater than left, with corresponding signs of air trapping, greatest in right lung. All CT scans at this facility use dose modulation, iterative reconstruction, and/or weight based dosing when appropriate to reduce radiation dose to as low as reasonably achievable. XR CHEST PORTABLE   Final Result   SUSPECT RIGHT UPPER LUNG ZONE INFILTRATE.                   XR CHEST PORTABLE    (Results Pending)           Assessment/Plan:    Active Hospital Problems    Diagnosis Date Noted    NSTEMI (non-ST elevated myocardial infarction) ([facility] [I21.4] 12/02/2020    Anginal equivalent ([facility] [I20.8] 12/01/2020  Acute chest pain [R07.9] 12/01/2020      NSTEMI, angina  Coronary artery disease  -Per primary  - Cardiac cath 12/2     Paroxysmal atrial fibrillation  -Per cardiology  -AC on Coumadin    Fever: Pt developed fever yesterday evening. WBCs 4.4.  - Procalcitonin 0.3  - UA pending  - Infectious disease consulted, abx per ID     Hypertension, hyperlipidemia: Continue home medications. PRN hydralazine     Iron deficiency anemia: Patient states he gets outpatient IV iron treatment. Appears close to his baseline. Maybe contribute to his sx of SOB  -Monitor CBC    Rheumatoid arthritis: Pt with ongoing workup to eval for infectious process  - Cont Leflunomide, prednisone  - Check CRP, Sed rate     SOB/ Abnormality on chest x-ray:   - Pulm consulted    H/o Tobacco Use: Former smoker, 50 py hx       Emphysema: Patient has a 50-pack-year history of smoking. He states he follows with pulmonology at [facility] however is requesting to change providers. We will add pulmonology consult for further opinion on possible pneumonia as well as COPD evaluation. I do not see PFTS in the chart for review. - PRN albuterol      Additional work up or/and treatment plan may be added today or then after based on clinical progression. I am managing a portion of pt care. Some medical issues are handled by other specialists. Additional work up and treatment should be done in out pt setting by pt PCP and other out pt providers. In addition to examining and evaluating pt, I spent additional time explaining care, normal and abnormal findings, and treatment plan. All of pt questions were answered. Counseling, diet and education were  provided. Case will be discussed with nursing staff when appropriate. Family will be updated if and when appropriate.       Diet: DIET CARDIAC; No Caffeine    Code Status: Full Code              Electronically signed by Iman Wren DO on 12/3/2020 at 2:54 PM

## 2020-12-03 NOTE — FLOWSHEET NOTE
3777 - Monitor room calls to notify of pt's a-fib in the 160s and sustaining, up as high as 185. PO lopressor given, Dr. Kit Aguilar notified. Orders given for IV lopressor PRN over the phone with [address],    [address] - Monitor room calls to notify nurse that pt is back in sinus rhythm. 2005 - Pt given tylenol 650 mg for a temperature of 100.4F     0045 - Pt's temperature of 101.7F reported to Dr. Basilia Briceno. Pt denies symptoms at this time. Pt's /72, also reported. States to give tylenol dose earlier than ordered and monitor. 0500 - Monitor tech called to notify that pt's Lu Artis Is sustaining in the 120s. PRN lopressor to be given per orders. Cath site checked multiple times during shift. No redness or signs of drainage, pulses palpable. Pt's call light within reach.

## 2020-12-04 NOTE — PROGRESS NOTES
Hospitalist Progress Note      PCP: Emir Clancy MD    Date of Admission: 12/1/2020    Chief Complaint: weakness    Subjective: pt awake/alert     Medications:  Reviewed    Infusion Medications   Scheduled Medications    dilTIAZem  60 mg Oral 3 times per day    metoprolol tartrate  100 mg Oral BID    cefTRIAXone (ROCEPHIN) IV  1 g Intravenous Q24H    baclofen  10 mg Oral TID    clopidogrel  75 mg Oral Daily    CoQ-10  50 mg Oral BID    pantoprazole  40 mg Oral Daily    ezetimibe  10 mg Oral Daily    ferrous sulfate  325 mg Oral Daily with breakfast    folic acid  1 mg Oral Daily    gabapentin  100 mg Oral TID    leflunomide  20 mg Oral Daily    lisinopril  10 mg Oral Daily    predniSONE  5 mg Oral Daily    pregabalin  75 mg Oral BID    Tofacitinib Citrate ER  11 mg Oral Daily    magnesium oxide  400 mg Oral BID    sodium chloride flush  10 mL Intravenous 2 times per day    sodium chloride flush  10 mL Intravenous 2 times per day    atorvastatin  80 mg Oral Nightly     PRN Meds: sodium chloride flush, albuterol sulfate HFA, metoprolol, sodium chloride flush, acetaminophen **OR** acetaminophen, polyethylene glycol, promethazine **OR** ondansetron, nitroGLYCERIN      Intake/Output Summary (Last 24 hours) at 12/4/2020 1237  Last data filed at 12/4/2020 0809  Gross per 24 hour   Intake 365 ml   Output --   Net 365 ml       Exam:    BP (!) 141/70   Pulse 120   Temp 99.3 °F (37.4 °C) (Oral)   Resp 20   Ht 5' 10\" (1.778 m)   Wt 199 lb 3.2 oz (90.4 kg)   SpO2 91%   BMI 28.58 kg/m²     General appearance: No apparent distress, appears stated age and cooperative. HEENT: Pupils equal, round, and reactive to light. Conjunctivae/corneas clear. Neck: Supple, with full range of motion. No jugular venous distention. Trachea midline. Respiratory:  Normal respiratory effort. Clear to auscultation, bilaterally without Rales/Wheezes/Rhonchi.   Cardiovascular: Regular rate and rhythm with normal S1/S2 without murmurs, rubs or gallops. Abdomen: Soft, non-tender, non-distended with normal bowel sounds. Musculoskeletal: No clubbing, cyanosis or edema bilaterally. Full range of motion without deformity. Skin: Skin color, texture, turgor normal.  No rashes or lesions. Neurologic:  Neurovascularly intact without any focal sensory/motor deficits. Cranial nerves: II-XII intact, grossly non-focal.  Psychiatric: Alert and oriented, thought content appropriate, normal insight  Capillary Refill: Brisk,< 3 seconds   Peripheral Pulses: +2 palpable, equal bilaterally       Labs:   Recent Labs     12/02/20  1202 12/03/20  0614 12/04/20  0649   WBC 6.6 4.4* 5.9   HGB 8.8* 8.2* 8.1*   HCT 27.4* 25.5* 25.7*    160 153     Recent Labs     12/02/20  1202 12/04/20  0649   * 134*   K 4.2 4.0    103   CO2 21 20   BUN 24* 22   CREATININE 0.87 0.85   CALCIUM 9.1 7.9*     No results for input(s): AST, ALT, BILIDIR, BILITOT, ALKPHOS in the last 72 hours. Recent Labs     12/02/20  1536 12/03/20  0614 12/04/20  0649   INR 1.7 1.2 1.4     Recent Labs     12/02/20  1202 12/02/20  1536   TROPONINI 0.067* 0.065*       Urinalysis:      Lab Results   Component Value Date    NITRU Negative 12/03/2020    WBCUA 0-2 12/03/2020    BACTERIA Negative 12/03/2020    RBCUA 0-2 12/03/2020    BLOODU Negative 12/03/2020    SPECGRAV 1.019 12/03/2020    GLUCOSEU 100 12/03/2020       Radiology:  XR CHEST PORTABLE   Final Result      STABLE MILD PULMONARY INFILTRATES, PREDOMINANTLY OF THE INFERIOR ASPECT OF THE RIGHT UPPER LOBE. CTA CHEST W WO CONTRAST   Final Result      No CT evidence pulmonary embolism. Emphysema. Dependent subsegmental atelectatic changes discussed, right greater than left, with corresponding signs of air trapping, greatest in right lung.                All CT scans at this facility use dose modulation, iterative reconstruction, and/or weight based dosing when appropriate to reduce radiation dose to as low as reasonably achievable. XR CHEST PORTABLE   Final Result   SUSPECT RIGHT UPPER LUNG ZONE INFILTRATE. Assessment/Plan:    Active Hospital Problems    Diagnosis Date Noted    NSTEMI (non-ST elevated myocardial infarction) ([facility] [I21.4] 12/02/2020    Anginal equivalent ([facility] [I20.8] 12/01/2020    Acute chest pain [R07.9] 12/01/2020         DVT Prophylaxis: coumadin  Diet: DIET CARDIAC; No Caffeine  Code Status: Full Code    PT/OT Eval Status:     Dispo - A fib/RVR/NSTEMI- full anticoag with coumadin, management per cardio pharmacy on case  Fever, pneumonia- atbs per ID  HTN-controlled  Obesity with BMI 28%- supportive care  Anemia- severe iron deff.  H/H remained stable, follow up with CBC AM. Initiate IV veniofer -checking stool guaiac   Medically stable, will follow up daily         Electronically signed by Suly Wilhelm MD on 12/4/2020 at 12:37 PM

## 2020-12-04 NOTE — PROGRESS NOTES
Physical Therapy Med Surg Initial Assessment  Facility/Department: [facility]  Room: P485/Y709-14       NAME: Bhavana Du  : 1950 (78 y.o.)  MRN: [id number]  CODE STATUS: Full Code    Date of Service: 2020    Patient Diagnosis(es): Anginal equivalent ([facility] [I20.8]  NSTEMI (non-ST elevated myocardial infarction) [facility]) [I21.4]   Chief Complaint   Patient presents with    Shortness of Breath     for last  two weeks.       Patient Active Problem List    Diagnosis Date Noted    Anemia due to acute blood loss      Priority: High    Gastrointestinal hemorrhage      Priority: High    NSTEMI (non-ST elevated myocardial infarction) ([address] 2020    Anginal equivalent ([address] 2020    Acute chest pain 2020    Herpes zoster with complication     Gastrointestinal hemorrhage with melena 2019    GI bleed 2019    Ventricular tachycardia ([address] 2019    Acute blood loss anemia 2018    Lipid disorder 10/22/2018    Post PTCA 10/22/2018    History of non-ST elevation myocardial infarction (NSTEMI) 10/19/2018    Paroxysmal atrial fibrillation with RVR ([address] 2017    PE (pulmonary thromboembolism) ([address] 2017    Long-term use of high-risk medication 2017    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    Methotrexate, long term, current use 02/10/2017    Vitamin D deficiency 2015    Nicotine dependence, uncomplicated 2143    Synovitis 2013    Chronic sinusitis 2012    Seasonal allergic rhinitis 2012    Essential hypertension     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 ([address]     Depression 2007        Past Medical History:   Diagnosis Date    CAD S/P percutaneous coronary angioplasty     Depression     History of DVT (deep vein thrombosis)     History of non-ST elevation myocardial infarction (NSTEMI) 10/19/2018    Overview: H POA Recent  Chest pain with sweats A CE+ Loaded with plavix  for cath with Dr Maury Hamilton yesterday PCI QIAN to RCA P Continue ASA, Plavix and xarelot for 1 month then DC ASA,continue on  BB and increased lipitor dose    History of pulmonary embolism     Hyperlipidemia     Hypertension     Osteoarthritis     Paroxysmal atrial fibrillation (HCC)     RA (rheumatoid arthritis) ([address]     [facility] Dr. Jodi Bender Rheumatoid arthritis(714.0)      Past Surgical History:   Procedure Laterality Date    COLONOSCOPY N/A 4/4/2019    COLONOSCOPY performed by Jameel Chadwick MD at [email or web address]  2004    OPEN REDUCTION LEFT LEG    UPPER GASTROINTESTINAL ENDOSCOPY N/A 11/10/2018    EGD ESOPHAGOGASTRODUODENOSCOPY performed by Jameel Chadwick MD at [address] 4/3/2019    EGD ESOPHAGOGASTRODUODENOSCOPY performed by Jameel Chadwick MD at [facility]       Chart Reviewed: Yes  Family / Caregiver Present: No  General Comment  Comments: Pt resting in bed - agreeable to PT evaluation    Restrictions:  Restrictions/Precautions: Fall Risk(High per Carmen Montgomery)     SUBJECTIVE: Subjective: \"I wish I could go home. \"    Pain  Pre Treatment Pain Screening  Comments / Details: denies    Post Treatment Pain Screening:   Pain Assessment  Pain Assessment: (denies)    Prior Level of Function:  Social/Functional History  Lives With: Spouse  Type of Home: House  Home Layout: Two level, Able to Live on Main level with bedroom/bathroom  Home Access: Stairs to enter without rails  Entrance Stairs - Number of Steps: 3  Home Equipment: (NA)  ADL Assistance: Independent  Homemaking Assistance: Independent  Ambulation Assistance: Independent  Transfer Assistance: Independent  Active : Yes    OBJECTIVE:   Vision: Within Functional Limits  Hearing: Within functional limits    Cognition:  Overall Orientation Status: Within Functional Limits  Follows Commands: Within Functional Limits    Observation/Palpation  Observation: No acute distress noted. Pt on 2L O2 via NC. Resting Sao2 = 97%. Removed supplemental O2 for assessment. pt desats to 95% SaO2, however maintains. ROM:  RLE PROM: WFL  LLE PROM: WFL    Strength:  Strength RLE  Strength RLE: WFL  Strength LLE  Strength LLE: WFL    Neuro:  Balance  Sitting - Static: Good  Sitting - Dynamic: Good  Standing - Static: Good;-  Standing - Dynamic: Fair     Motor Control  Gross Motor?: WFL  Sensation  Overall Sensation Status: WFL    Bed mobility  Rolling to Left: Modified independent  Rolling to Right: Modified independent  Supine to Sit: Modified independent  Sit to Supine: Modified independent    Transfers  Sit to Stand: Supervision  Stand to sit: Supervision  Bed to Chair: Stand by assistance    Ambulation  Ambulation?: Yes  Ambulation 1  Surface: level tile  Device: No Device  Assistance: Stand by assistance  Quality of Gait: Pt with multiple deviations from straight path however able to correct with verbal cues. Inconsistent pattern and pacing. Visibly fatigued, however pt states \"i'm alright. \"  Distance: 15ft X 2 with turn              Activity Tolerance  Activity Tolerance: Patient limited by endurance  Activity Tolerance: Pt with desaturation to 84% following ambulatory activities. Recovers within 30sec after seated and 2L o2 resumed. PT Education  PT Education: Goals;PT Role;Plan of Care    ASSESSMENT:   Body structures, Functions, Activity limitations: Decreased functional mobility ; Decreased safe awareness;Decreased balance;Decreased endurance;Decreased coordination;Decreased ADL status  Decision Making: Medium Complexity  History: Med  Exam: Med  Clinical Presentation: Med    Prognosis: Good  Barriers to Learning: none at this time    DISCHARGE RECOMMENDATIONS:  Discharge Recommendations: Continue to assess pending progress, Patient would benefit from continued therapy after discharge    Assessment: Continued PT indicated to progress mobility and facilitate DC at highest level of indep and safety. REQUIRES PT FOLLOW UP: Yes      PLAN OF CARE:  Plan  Times per week: 3-6  Current Treatment Recommendations: Strengthening, ROM, Balance Training, Functional Mobility Training, Transfer Training, ADL/Self-care Training, Endurance Training, Gait Training, Stair training, Neuromuscular Re-education, Home Exercise Program, Safety Education & Training, Patient/Caregiver Education & Training, Equipment Evaluation, Education, & procurement  Safety Devices  Type of devices: All fall risk precautions in place    Goals:  Short term goals  Short term goal 1: Pt to complete HEP with indep (sink exes)  Long term goals  Long term goal 1: Pt to complete bed mobility with indep  Long term goal 2: Pt to complete transfers with indep  Long term goal 3: Pt to ambulate 50-150ft with LRD and indep  Long term goal 4: Pt to manage 3 steps without HR indep    AMPAC (6 CLICK) BASIC MOBILITY  AM-PAC Inpatient Mobility Raw Score : 21     Therapy Time:   Individual   Time In 1520   Time Out 1530   Minutes 10           Jon Manley, [address] [place] [address], 12/04/20 at 3:39 PM         Definitions for assistance levels  Independent = pt does not require any physical supervision or assistance from another person for activity completion. Device may be needed.   Stand by assistance = pt requires verbal cues or instructions from another person, close to but not touching, to perform the activity  Minimal assistance= pt performs 75% or more of the activity; assistance is required to complete the activity  Moderate assistance= pt performs 50% of the activity; assistance is required to complete the activity  Maximal assistance = pt performs 25% of the activity; assistance is required to complete the activity  Dependent = pt requires total physical assistance to accomplish the task

## 2020-12-04 NOTE — PROGRESS NOTES
Infectious Disease     Patient Name: Tegan Breen  Date: 12/4/2020  YOB: 1950  Medical Record Number: [id number]        Right upper lung pneumonia        Patient presents with shortness of breath for 2 weeks  Some nonspecific chest discomfort  No fevers chills sputum production    Fevers continue 102      X-ray shows a faint infiltrate right upper lung on 12/1/2020  CT scan some questionable infiltrate right lung like this was in both lung bases    Blood cultures are negative so far    Procalcitonin elevated 0.3  Blood cell count 4400  COVID-19  X 2 testing negative      93% saturation on 3 L nasal cannula      Review of Systems   Constitutional: Negative. Eyes: Negative. Respiratory: Positive for cough and shortness of breath. Cardiovascular: Negative. Gastrointestinal: Negative. Physical Exam   Constitutional: He is oriented to person, place, and time. Cardiovascular: Normal heart sounds. No murmur heard. Pulmonary/Chest: Breath sounds normal. No respiratory distress. He has no wheezes. He has no rales. Abdominal: Soft. Bowel sounds are normal. He exhibits no distension and no mass. There is no abdominal tenderness. There is no rebound. Neurological: He is alert and oriented to person, place, and time. Blood pressure (!) 141/70, pulse 120, temperature 99.3 °F (37.4 °C), temperature source Oral, resp. rate 20, height 5' 10\" (1.778 m), weight 199 lb 3.2 oz (90.4 kg), SpO2 91 %.       .   Lab Results   Component Value Date    WBC 5.9 12/04/2020    HGB 8.1 (L) 12/04/2020    HCT 25.7 (L) 12/04/2020    MCV 80.9 12/04/2020     12/04/2020     Lab Results   Component Value Date     12/04/2020    K 4.0 12/04/2020    K 4.6 08/30/2019     12/04/2020    CO2 20 12/04/2020    BUN 22 12/04/2020    CREATININE 0.85 12/04/2020    GLUCOSE 96 12/04/2020    GLUCOSE 95 04/26/2012    CALCIUM 7.9 12/04/2020      Microscopic Urinalysis [[id number]]  Collected: 12/03/20 1702         Updated: 12/03/20 2008        Bacteria, UA  Negative  /HPF         Hyaline Casts, UA  1-3  /HPF         WBC, UA  0-2  /HPF         RBC, UA  0-2  /HPF         Epithelial Cells, UA  0-2  /HPF         High sensitivity CRP [[id number]] (Abnormal)  Collected: 12/03/20 1625        Specimen: Blood  Updated: 12/03/20 1914        CRP High Sensitivity  231.0  mg/L         Blood cultures remain no growth      ASSESSMENT  PLAN:    Possible right lung pneumonia    Rocephin

## 2020-12-04 NOTE — CONSULTS
Pulmonary Medicine  Consult Note      Reason for consultation: Shortness of breath    Consulting physician: Dr. Kassandra Baker:    This is 72-year-old man with history of coronary artery disease, depression, DVT, pulmonary emboli, myocardial infarction, hypertension, hyperlipidemia, atrial fibrillation, rheumatoid arthritis, mild COPD who was presented with increased shortness of breath for last 2 to 3 weeks and nonspecific chest pain. According to patient he is had COVID-19 test x3 was reported negative. He said he is following at [facility] regarding his lung and he had PFT done in past and he was told he has mild COPD. Patient has increased shortness of breath along with fever and increased heart rate. X-ray showing right upper lung infiltration and CT chest shows questionable infiltration right lung and subsegmental atelectasis and infiltration and changes of emphysema. No pulmonary embolism. ID is following blood culture is negative. Procalcitonin is slightly elevated. He is started on Rocephin 1 g every 24 hourly. He is on bronchodilator with albuterol HFA as needed basis. He has a history of smoking 1 pack a day for 50 years she quit few years ago.         Past Medical History:        Diagnosis Date    CAD S/P percutaneous coronary angioplasty     Depression 2007    History of DVT (deep vein thrombosis)     History of non-ST elevation myocardial infarction (NSTEMI) 10/19/2018    Overview:  H POA Recent  Chest pain with sweats A CE+ Loaded with plavix  for cath with Dr Amrita Mcfadden yesterday PCI QIAN to RCA P Continue ASA, Plavix and xarelot for 1 month then DC ASA,continue on  BB and increased lipitor dose    History of pulmonary embolism     Hyperlipidemia     Hypertension     Osteoarthritis     Paroxysmal atrial fibrillation (HCC)     RA (rheumatoid arthritis) ([facility] [address]     [facility] Dr. Rowena Horn Rheumatoid arthritis(714.0)        Past Surgical History:        Procedure Laterality Date    COLONOSCOPY N/A 4/4/2019    COLONOSCOPY performed by Gloria Montelongo MD at [address]  2004    OPEN REDUCTION LEFT LEG    UPPER GASTROINTESTINAL ENDOSCOPY N/A 11/10/2018    EGD ESOPHAGOGASTRODUODENOSCOPY performed by Gloria Montelongo MD at [facility] N/A 4/3/2019    EGD ESOPHAGOGASTRODUODENOSCOPY performed by Gloria Montelongo MD at [place] [address] History:     reports that he quit smoking about 3 years ago. His smoking use included cigarettes. He has a 50.00 pack-year smoking history. He has never used smokeless tobacco. He reports that he does not drink alcohol or use drugs.     Family History:       Problem Relation Age of Onset    High Blood Pressure Father     Heart Attack Father     Coronary Art Dis Father        Allergies:  Antihistamine [diphenhydramine hcl]        MEDICATIONS during current hospitalization:    Continuous Infusions:    Scheduled Meds:   metoprolol tartrate  100 mg Oral BID    dilTIAZem  30 mg Oral 4 times per day    cefTRIAXone (ROCEPHIN) IV  1 g Intravenous Q24H    baclofen  10 mg Oral TID    clopidogrel  75 mg Oral Daily    CoQ-10  50 mg Oral BID    pantoprazole  40 mg Oral Daily    ezetimibe  10 mg Oral Daily    ferrous sulfate  325 mg Oral Daily with breakfast    folic acid  1 mg Oral Daily    gabapentin  100 mg Oral TID    leflunomide  20 mg Oral Daily    lisinopril  10 mg Oral Daily    predniSONE  5 mg Oral Daily    pregabalin  75 mg Oral BID    Tofacitinib Citrate ER  11 mg Oral Daily    magnesium oxide  400 mg Oral BID    sodium chloride flush  10 mL Intravenous 2 times per day    enoxaparin  1 mg/kg Subcutaneous BID    sodium chloride flush  10 mL Intravenous 2 times per day    atorvastatin  80 mg Oral Nightly       PRN Meds:sodium chloride flush, albuterol sulfate HFA, metoprolol, sodium chloride flush, acetaminophen **OR** acetaminophen, polyethylene glycol, promethazine **OR** ondansetron, nitroGLYCERIN    REVIEW OF SYSTEMS:  As in history of present illness  Other 14 point review of system is negative. PHYSICAL EXAM:    Vitals:  /66   Pulse 98   Temp 100.2 °F (37.9 °C) (Oral)   Resp 20   Ht 5' 10\" (1.778 m)   Wt 202 lb 11.2 oz (91.9 kg)   SpO2 91%   BMI 29.08 kg/m²   General:  Alert, awake, Oriented x3 comfortable in bed, No distress. Head: Atraumatic , Normocephalic   Eyes: PERRL. No sclera icterus. No conjunctival injection. No discharge   ENT: No nasal  discharge. Pharynx clear. Neck:  Trachea midline. No thyromegaly, no JVD, No cervical adenopathy. Chest : Bilaterally symmetrical ,Normal effort,  No accessory muscle use  Lung : Diminished BS bilateraly. No Rales. No wheezing. No rhonchi. Heart[de-identified] Normal  rate. Regular rhythm. No mumur ,  Rub or gallop  ABD: Non-tender. Non-distended. No masses. No organmegaly. Normal bowel sounds. No hernia. Musculoskeleton: normal range of motion in all extremites, strength and tone   Extremities: No pitting in both lower leg , No Cyanosis ,No clubbing  Neuro: no cranial nerve abnormality, normal reflex and sensation, no focal weakness   Skin: Warm and dry. No erythema rash on exposed extremities. Data Review  Recent Labs     12/01/20  1200 12/01/20  1235 12/02/20  1202 12/03/20  0614   WBC 8.0  --  6.6 4.4*   HGB 9.0* 8.3* 8.8* 8.2*   HCT 27.9*  --  27.4* 25.5*     --  186 160      Recent Labs     12/01/20  1200 12/01/20  1235 12/02/20  1202   *  --  134*   K 5.2*  --  4.2   CL 99  --  100   CO2 20  --  21   BUN 33*  --  24*   CREATININE 1.23* 1.3 0.87   GLUCOSE 125*  --  101*       MV Settings:           ABGs:   Recent Labs     12/01/20  1235   PHART 7.542*   IDA4FPC 24*   PO2ART 75*   QPX1OUM 20.9*   BEART -2   F6FDUFHF 97*   ZHG6EPW 22     O2 Device: None (Room air)  Lab Results   Component Value Date    LACTA 3.4 12/01/2020       Radiology  Cta Chest W Wo Contrast    Result Date: 12/1/2020  EXAM: CT SCAN OF THE THORAX WITH CONTRAST/PE PROTOCOL/CTA CHEST COMPARISON: CHEST RADIOGRAPHS, DECEMBER 1, 2020, APRIL 2, 2019. REASON FOR EXAMINATION:  SHORTNESS OF BREATH FOR SEVERAL WEEKS TECHNIQUE: Helical CTA was performed through the chest utilizing 100 mL of Isovue 300 intravenous contrast medium. .  Images were obtained with bolus tracking in order to opacify the pulmonary arteries. Thick section coronal MIP 3D reconstructions were performed on a separate workstation. FINDINGS:  No intraluminal filling defects, pulmonary arterial tree. Thoracic aorta normal in course and caliber. Cardiac size normal. No pericardial effusion. Coronary calcification identified. No hilar, mediastinal, or axillary lymph node enlargement. Limited imaging upper abdomen shows small hiatal hernia. Adrenal glands without anomaly. Right lung shows emphysematous change, with fibrous scarring at right lung apex. Dependent subsegmental atelectatic change, right upper lobe, with mosaic morphology identified. Dependent subsegmental atelectatic change right lower lobe. Focal area of bullous change right lung base. No nodules, masses, pleural effusion. Left lung shows emphysematous change with fibrous scarring at left lung apex. Dependent subsegmental atelectatic change, left lung base. No osteoblastic, no osteolytic lesions. No CT evidence pulmonary embolism. Emphysema. Dependent subsegmental atelectatic changes discussed, right greater than left, with corresponding signs of air trapping, greatest in right lung. All CT scans at this facility use dose modulation, iterative reconstruction, and/or weight based dosing when appropriate to reduce radiation dose to as low as reasonably achievable. Xr Chest Portable    Result Date: 12/3/2020  XR CHEST PORTABLE : 12/3/2020 CLINICAL HISTORY:  Right lung pneumonia . COMPARISON: Portable chest and chest CTA 12/1/2020. TECHNIQUE: Two portable upright AP radiographs of the chest were obtained.  FINDINGS: Mild mid to lower lung field pulmonary infiltrates predominantly within the inferior aspect of the right upper lobe have not significantly changed from the prior studies. There is no significant pleural effusion, cardiomegaly, vascular congestion, pneumothorax, or displaced fractures identified. STABLE MILD PULMONARY INFILTRATES, PREDOMINANTLY OF THE INFERIOR ASPECT OF THE RIGHT UPPER LOBE. Xr Chest Portable    Result Date: 12/1/2020  EXAMINATION: XR CHEST PORTABLE. DATE AND TIME:12/1/2020 12:12 PM CLINICAL HISTORY: SHORTNESS OF BREATH   SOB  COMPARISONS: FEBRUARY 27, 2019  FINDINGS: Patchy area of increased opacity in the right midlung zone suspicious  for small infiltrate. A Chest x-ray is insensitive in mild or early COVID-19  infection. Furthermore the specificity of chest x-ray findings for COVID-19 pneumonia is not established. Remaining lung fields clear. Heart and mediastinum within normal limits. SUSPECT RIGHT UPPER LUNG ZONE INFILTRATE. Assessment and  plan:     1. Possible right upper lung pneumonia  2. Mild COPD  3. History of DVT and PE  4. Rheumatoid arthritis  5. Essential hypertension  6. Paroxysmal A. fib with RVR  7. Anemia  8. Coronary artery disease status post stent    Patient is having shortness of breath for last 2 to 3 weeks also had a fever, chest x-ray showing possible right upper lung pneumonia. CT chest shows patchy increased infiltration in the right upper lung areas and bibasilar atelectasis, no pulmonary embolism and emphysema. He has elevated pro-Levi and CRP, empirically started on Rocephin 1 g every 24 hourly. Blood culture negative so far. ABG was done on room air pH 7.54 with PCO2 of 24 PO2 75 bicarb 20.9 and O2 sat is 97. He said he had a PFT done at [facility] clinic and he was told he has mild obstructive pulmonary disease. He is not wheezing at this time he is on albuterol HFA as needed basis.   He had cardiac cath yesterday which shows previously placed LAD circumflex and RCA stent are patent. At this time continue antibiotic as per ID recommendation recommend complete PFT as outpatient and home O2 evaluation before discharge.   We will follow    Thank you for consult    Electronically signed by Jose Barksdale MD, FCCP on 12/3/2020 at 8:02 PM

## 2020-12-04 NOTE — PROGRESS NOTES
irregular. Heart sounds: No murmur. No friction rub. No gallop. Pulmonary:      Effort: No respiratory distress. Breath sounds: No wheezing or rales. Chest:      Chest wall: No tenderness. Abdominal:      General: There is no distension. Palpations: Abdomen is soft. There is no mass. Tenderness: There is no abdominal tenderness. There is no guarding or rebound. Musculoskeletal: Normal range of motion. Lymphadenopathy:      Cervical: No cervical adenopathy. Skin:     General: Skin is warm. Neurological:      Mental Status: He is alert and oriented to person, place, and time.          LABS:  CBC:   Lab Results   Component Value Date    WBC 5.9 12/04/2020    RBC 3.17 12/04/2020    RBC 4.38 04/26/2012    HGB 8.1 12/04/2020    HCT 25.7 12/04/2020    MCV 80.9 12/04/2020    MCH 25.5 12/04/2020    MCHC 31.5 12/04/2020    RDW 29.6 12/04/2020     12/04/2020    MPV 8.3 04/26/2012     CBC with Differential:   Lab Results   Component Value Date    WBC 5.9 12/04/2020    RBC 3.17 12/04/2020    RBC 4.38 04/26/2012    HGB 8.1 12/04/2020    HCT 25.7 12/04/2020     12/04/2020    MCV 80.9 12/04/2020    MCH 25.5 12/04/2020    MCHC 31.5 12/04/2020    RDW 29.6 12/04/2020    LYMPHOPCT 1.6 12/01/2020    MONOPCT 0.4 12/01/2020    BASOPCT 0.3 12/01/2020    MONOSABS 0.0 12/01/2020    LYMPHSABS 0.1 12/01/2020    EOSABS 0.0 12/01/2020    BASOSABS 0.0 12/01/2020     CMP:    Lab Results   Component Value Date     12/04/2020    K 4.0 12/04/2020    K 4.6 08/30/2019     12/04/2020    CO2 20 12/04/2020    BUN 22 12/04/2020    CREATININE 0.85 12/04/2020    GFRAA >60.0 12/04/2020    LABGLOM >60.0 12/04/2020    GLUCOSE 96 12/04/2020    GLUCOSE 95 04/26/2012    PROT 6.0 12/01/2020    LABALBU 3.0 12/01/2020    LABALBU 4.5 04/26/2012    CALCIUM 7.9 12/04/2020    BILITOT 0.5 12/01/2020    ALKPHOS 38 12/01/2020    AST 45 12/01/2020    ALT 33 12/01/2020     BMP:    Lab Results   Component Value Date  12/04/2020    K 4.0 12/04/2020    K 4.6 08/30/2019     12/04/2020    CO2 20 12/04/2020    BUN 22 12/04/2020    LABALBU 3.0 12/01/2020    LABALBU 4.5 04/26/2012    CREATININE 0.85 12/04/2020    CALCIUM 7.9 12/04/2020    GFRAA >60.0 12/04/2020    LABGLOM >60.0 12/04/2020    GLUCOSE 96 12/04/2020    GLUCOSE 95 04/26/2012     Magnesium:    Lab Results   Component Value Date    MG 1.6 12/04/2020     Troponin:    Lab Results   Component Value Date    TROPONINI 0.065 12/02/2020       Radiology:  Xr Chest Portable    Result Date: 12/3/2020  XR CHEST PORTABLE : 12/3/2020 CLINICAL HISTORY:  Right lung pneumonia . COMPARISON: Portable chest and chest CTA 12/1/2020. TECHNIQUE: Two portable upright AP radiographs of the chest were obtained. FINDINGS: Mild mid to lower lung field pulmonary infiltrates predominantly within the inferior aspect of the right upper lobe have not significantly changed from the prior studies. There is no significant pleural effusion, cardiomegaly, vascular congestion, pneumothorax, or displaced fractures identified. STABLE MILD PULMONARY INFILTRATES, PREDOMINANTLY OF THE INFERIOR ASPECT OF THE RIGHT UPPER LOBE.        EKG:   Assessment:    Active Hospital Problems    Diagnosis Date Noted    NSTEMI (non-ST elevated myocardial infarction) ([address] [I21.4] 12/02/2020    Anginal equivalent ([address] [I20.8] 12/01/2020    Acute chest pain [R07.9] 12/01/2020         Pneumonia        Anticoagulated        Atrial fibrillation     Plan:  Appreciate ID input  PT OT eval  Increase Cardizem to 60 3 times daily  He still has fever and get hypoxic and atrial fibrillation rate is not controlled. Despite the fact he wants to go home, will retain him for at least 24 to 48 hours until blood cultures are obtained and received and fever is resolved and atrial fibrillation better controlled  Coumadin being managed by pharmacy.   On Lovenox in the interim  Electronically signed by Bhumi Gutierres MD on 12/4/2020 at 11:11 AM

## 2020-12-04 NOTE — PROGRESS NOTES
INPATIENT PROGRESS NOTES    PATIENT NAME: Brendan Nair  MRN: [id number]  SERVICE DATE:  December 4, 2020   SERVICE TIME:  5:50 PM      PRIMARY SERVICE: Pulmonary Disease    CHIEF COMPLAIN: Shortness of breath      INTERVAL HPI: Patient seen and examined at bedside, Interval Notes, orders reviewed. Nursing notes noted  Patient is feeling little better today. He had a fever up to 102.6 F. He is on Rocephin 1 g every 24 hourly ID is following. He is having short of breath with exertion and nonspecific chest discomfort. He does not have a cough or sputum production. On 2 L O2 via nasal cannula his O2 saturation 90%    OBJECTIVE    Body mass index is 28.58 kg/m². PHYSICAL EXAM:  Vitals:  /64   Pulse 92   Temp 102.6 °F (39.2 °C) (Oral)   Resp 20   Ht 5' 10\" (1.778 m)   Wt 199 lb 3.2 oz (90.4 kg)   SpO2 90%   BMI 28.58 kg/m²   General: Alert, awake . comfortable in bed, No distress. Head: Atraumatic , Normocephalic   Eyes: PERRL. No sclera icterus. No conjunctival injection. No discharge   ENT: No nasal  discharge. Pharynx clear. lips, teeth, mucosa and gums are normal, tongue protrudes in the midline  Neck:  Trachea midline. No thyromegaly, no JVD, No cervical adenopathy. Chest : Bilaterally symmetrical ,Normal effort,  No accessory muscle use  Lung : . Fair BS bilateral, decreased BS at bases. No Rales. No wheezing. No rhonchi. Heart[de-identified] Normal  rate. Regular rhythm. No mumur ,  Rub or gallop  ABD: Non-tender. Non-distended. No masses. No organmegaly. Normal bowel sounds. No hernia.   Ext : No Pitting both leg , No Cyanosis No clubbing  Neuro: no focal weakness          DATA:   Recent Labs     12/03/20  0614 12/04/20  0649   WBC 4.4* 5.9   HGB 8.2* 8.1*   HCT 25.5* 25.7*   MCV 80.7 80.9    153     Recent Labs     12/02/20  1202 12/04/20  0649   * 134*   K 4.2 4.0    103   CO2 21 20   BUN 24* 22   CREATININE 0.87 0.85   GLUCOSE 101* 96   CALCIUM 9.1 7.9*   LABGLOM >60.0 >60.0 GFRAA >60.0 >60.0       MV Settings:          No results for input(s): PHART, JPC9ZTS, PO2ART, RCG6ZRQ, BEART, P4FDFXFB in the last 72 hours. O2 Device: Nasal cannula  O2 Flow Rate (L/min): 2 L/min    DIET CARDIAC; No Caffeine     MEDICATIONS during current hospitalization:    Continuous Infusions:    Scheduled Meds:   dilTIAZem  60 mg Oral 3 times per day    iron sucrose  200 mg Intravenous Q24H    warfarin (COUMADIN) daily dosing (placeholder)   Other RX Placeholder    metoprolol tartrate  100 mg Oral BID    cefTRIAXone (ROCEPHIN) IV  1 g Intravenous Q24H    baclofen  10 mg Oral TID    clopidogrel  75 mg Oral Daily    CoQ-10  50 mg Oral BID    pantoprazole  40 mg Oral Daily    ezetimibe  10 mg Oral Daily    ferrous sulfate  325 mg Oral Daily with breakfast    folic acid  1 mg Oral Daily    gabapentin  100 mg Oral TID    leflunomide  20 mg Oral Daily    lisinopril  10 mg Oral Daily    predniSONE  5 mg Oral Daily    pregabalin  75 mg Oral BID    Tofacitinib Citrate ER  11 mg Oral Daily    magnesium oxide  400 mg Oral BID    sodium chloride flush  10 mL Intravenous 2 times per day    sodium chloride flush  10 mL Intravenous 2 times per day    atorvastatin  80 mg Oral Nightly       PRN Meds:sodium chloride flush, albuterol sulfate HFA, metoprolol, sodium chloride flush, acetaminophen **OR** acetaminophen, polyethylene glycol, promethazine **OR** ondansetron, nitroGLYCERIN    Radiology  Cta Chest W Wo Contrast    Result Date: 12/1/2020  EXAM: CT SCAN OF THE THORAX WITH CONTRAST/PE PROTOCOL/CTA CHEST COMPARISON: CHEST RADIOGRAPHS, DECEMBER 1, 2020, APRIL 2, 2019. REASON FOR EXAMINATION:  SHORTNESS OF BREATH FOR SEVERAL WEEKS TECHNIQUE: Helical CTA was performed through the chest utilizing 100 mL of Isovue 300 intravenous contrast medium. .  Images were obtained with bolus tracking in order to opacify the pulmonary arteries.   Thick section coronal MIP 3D reconstructions were performed on a 12/1/2020  EXAMINATION: XR CHEST PORTABLE. DATE AND TIME:12/1/2020 12:12 PM CLINICAL HISTORY: SHORTNESS OF BREATH   SOB  COMPARISONS: FEBRUARY 27, 2019  FINDINGS: Patchy area of increased opacity in the right midlung zone suspicious  for small infiltrate. A Chest x-ray is insensitive in mild or early COVID-19  infection. Furthermore the specificity of chest x-ray findings for COVID-19 pneumonia is not established. Remaining lung fields clear. Heart and mediastinum within normal limits. SUSPECT RIGHT UPPER LUNG ZONE INFILTRATE. IMPRESSION AND SUGGESTION:  1. Possible right upper lung pneumonia, rule out other source of fever  2. Mild COPD  3. History of DVT and PE  4. Rheumatoid arthritis  5. Essential hypertension  6. Paroxysmal A. fib with RVR  7. Anemia  8. Coronary artery disease status post stent    Patient continued to spike fever. ID is following giving Rocephin 1 g every 24 hourly. Possible right upper lobe pneumonia though other source need to rule out since there is no definite consolidation noted in CT chest.  He has persistent fever today. Blood culture is pending. ABG was done on room air pH 7.54 with PCO2 of 24 PO2 75 bicarb 20.9 and O2 sat is 97. He said he had a PFT done at [facility] clinic and he was told he has mild obstructive pulmonary disease. He is not wheezing at this time he is on albuterol HFA as needed basis. I spent more than 35 min with this patient's care , greater the 50% of this time was spent in counseling and/or coordinating of care.         Electronically signed by Beata Anderson MD, FCCP on 12/4/2020 at 5:50 PM

## 2020-12-05 NOTE — PROGRESS NOTES
Infectious Disease     Patient Name: Corona Contreras  Date: 12/5/2020  YOB: 1950  Medical Record Number: [id number]        Right upper lung pneumonia        X-ray shows a faint infiltrate right upper lung on 12/1/2020  CT scan some questionable infiltrate right lung like this was in both lung bases    Blood cultures are negative so far    Procalcitonin elevated 0.3  Blood cell count 4400  COVID-19  X 2 testing negative    90%  sat        Review of Systems   Constitutional: Negative. Eyes: Negative. Respiratory: Positive for cough and shortness of breath. Cardiovascular: Negative. Gastrointestinal: Negative. Physical Exam   Constitutional: He is oriented to person, place, and time. Cardiovascular: Normal heart sounds. No murmur heard. Pulmonary/Chest: Breath sounds normal. No respiratory distress. He has no wheezes. He has no rales. Abdominal: Soft. Bowel sounds are normal. He exhibits no distension and no mass. There is no abdominal tenderness. There is no rebound. Neurological: He is alert and oriented to person, place, and time. Blood pressure (!) 108/59, pulse 114, temperature 99.7 °F (37.6 °C), resp. rate 18, height 5' 10\" (1.778 m), weight 199 lb 3.2 oz (90.4 kg), SpO2 90 %.       .   Lab Results   Component Value Date    WBC 5.6 12/05/2020    HGB 8.7 (L) 12/05/2020    HCT 27.7 (L) 12/05/2020    MCV 83.3 12/05/2020     12/05/2020     Lab Results   Component Value Date     12/05/2020    K 4.8 12/05/2020    K 4.6 08/30/2019     12/05/2020    CO2 17 12/05/2020    BUN 29 12/05/2020    CREATININE 0.88 12/05/2020    GLUCOSE 85 12/05/2020    GLUCOSE 95 04/26/2012    CALCIUM 8.6 12/05/2020      Microscopic Urinalysis [[id number]]  Collected: 12/03/20 1702         Updated: 12/03/20 2008        Bacteria, UA  Negative  /HPF         Hyaline Casts, UA  1-3  /HPF         WBC, UA  0-2  /HPF         RBC, UA  0-2  /HPF         Epithelial Cells, UA  0-2 /HPF         High sensitivity CRP [[id number]] (Abnormal)  Collected: 12/03/20 1625        Specimen: Blood  Updated: 12/03/20 1914        CRP High Sensitivity  231.0  mg/L         Blood cultures remain no growth      ASSESSMENT  PLAN:    Possible right lung pneumonia    Rocephin

## 2020-12-05 NOTE — PROGRESS NOTES
self up. Session ended abruptly d/t patient phone call. Would benefit from contiued therapy. Discharge Recommendations:  Continue to assess pending progress, Patient would benefit from continued therapy after discharge    Goals  Short term goals  Short term goal 1: Pt to complete HEP with indep (sink exes)  Long term goals  Long term goal 1: Pt to complete bed mobility with indep  Long term goal 2: Pt to complete transfers with indep  Long term goal 3: Pt to ambulate 50-150ft with LRD and indep  Long term goal 4: Pt to manage 3 steps without HR indep    PLAN    Times per week: 3-6        AMPAC (6 CLICK) BASIC MOBILITY  AM-PAC Inpatient Mobility Raw Score : 22     Therapy Time   Individual   Time In 1530   Time Out 1540   Minutes 10         tf / bed mob 2 min  therex 8 min    Cely Owens PTA, 12/05/20 at 3:46 PM         Definitions for assistance levels  Independent = pt does not require any physical supervision or assistance from another person for activity completion. Device may be needed.   Stand by assistance = pt requires verbal cues or instructions from another person, close to but not touching, to perform the activity  Minimal assistance= pt performs 75% or more of the activity; assistance is required to complete the activity  Moderate assistance= pt performs 50% of the activity; assistance is required to complete the activity  Maximal assistance = pt performs 25% of the activity; assistance is required to complete the activity  Dependent = pt requires total physical assistance to accomplish the task

## 2020-12-05 NOTE — PROGRESS NOTES
Pt SPO2 on room air at rest 95%. Pt SPO2 on room air while ambulating 88%. Pt SPO2 on 2L at rest 97%. Pt SPO2 while ambulating on 2L 94%.

## 2020-12-05 NOTE — DISCHARGE SUMMARY
Cardiology Discharge Summary      Patient Identification:  Juliana Rivas  : 1950  MRN: [id number]   Account: [de-identified]     Admit date: 2020  Discharge date: 2020   Attending provider: Megan Mas MD        Primary care provider: Freda Jay MD     Admission Diagnoses:  <principal problem not specified>     NSTEMI Cath Patent LAD CX and RCA stents. Fever- Right PNA    Discharge Diagnoses: Active Hospital Problems    Diagnosis Date Noted    NSTEMI (non-ST elevated myocardial infarction) ([facility] [I21.4] 2020     Priority: Low    Anginal equivalent ([facility] [I20.8] 2020     Priority: Low    Acute chest pain [R07.9] 2020     Priority: Low     PNA     Hospital Course:   Juliana Rivas is a 78 y.o. male admitted to [facility] on 2020 for . Cath. Medical management for PNA- pt had Fever 100 today but insist on going home. Will check wioth Dr. Lalo Gama for advise. .     Procedures:   1. CATH     Consults:   IP CONSULT TO HOSPITALIST  IP CONSULT TO PULMONOLOGY  IP CONSULT TO INFECTIOUS DISEASES  IP CONSULT TO PHARMACY  IP CONSULT TO HOME CARE NEEDS    Examination:  BP (!) 118/59   Pulse (!) 37   Temp 100 °F (37.8 °C) (Oral)   Resp 18   Ht 5' 10\" (1.778 m)   Wt 199 lb 3.2 oz (90.4 kg)   SpO2 95%   BMI 28.58 kg/m²    Physical Exam   Constitutional: He appears healthy. No distress. HENT:   Normal cephalic and Atraumatic   Eyes: Pupils are equal, round, and reactive to light. Neck: Normal range of motion and thyroid normal. Neck supple. No JVD present. No neck adenopathy. No thyromegaly present. Cardiovascular: Normal rate, regular rhythm, normal heart sounds, intact distal pulses and normal pulses. Pulmonary/Chest: Effort normal and breath sounds normal. He has no wheezes. He has no rales. He exhibits no tenderness. Abdominal: Soft.  Bowel sounds are normal. There is no abdominal tenderness. Musculoskeletal: Normal range of motion. General: No tenderness or edema. Neurological: He is alert and oriented to person, place, and time. Skin: Skin is warm. No cyanosis. Nails show no clubbing. Medications:  Current Discharge Medication List      CONTINUE these medications which have NOT CHANGED    Details   baclofen (LIORESAL) 10 MG tablet Take 10 mg by mouth 3 times daily      ferrous sulfate (IRON 325) 325 (65 Fe) MG tablet Take 325 mg by mouth daily (with breakfast)      leflunomide (ARAVA) 20 MG tablet Take 20 mg by mouth daily      pregabalin (LYRICA) 75 MG capsule Take 75 mg by mouth 2 times daily. Tofacitinib Citrate (XELJANZ PO) Take 11 mg by mouth daily      atorvastatin (LIPITOR) 80 MG tablet Take 1 tablet by mouth daily  Qty: 30 tablet, Refills: 3    Associated Diagnoses: Hyperlipidemia, unspecified hyperlipidemia type      metoprolol tartrate (LOPRESSOR) 50 MG tablet Take 1.5 tablets by mouth 2 times daily  Qty: 135 tablet, Refills: 3      warfarin (COUMADIN) 4 MG tablet Take 4 mg by mouth 4mg tuesdays and fridays  2mg all other days      Cyanocobalamin (B-12 PO) Take by mouth      ezetimibe (ZETIA) 10 MG tablet Take 10 mg by mouth daily      clopidogrel (PLAVIX) 75 MG tablet Take 75 mg by mouth daily      esomeprazole Magnesium (NEXIUM) 40 MG PACK Take 40 mg by mouth daily      folic acid (FOLVITE) 1 MG tablet Take 1 mg by mouth daily      lisinopril (PRINIVIL;ZESTRIL) 10 MG tablet Take 10 mg by mouth daily      predniSONE (DELTASONE) 5 MG tablet Take 5 mg by mouth daily      VITAMIN D, CHOLECALCIFEROL, PO Take 4,000 Int'l Units by mouth daily       gabapentin (NEURONTIN) 100 MG capsule Take 1 capsule by mouth 3 times daily for 30 days.   Qty: 90 capsule, Refills: 0      Multiple Vitamins-Minerals (MULTIVITAMIN ADULT PO) Take by mouth      methotrexate (RHEUMATREX) 2.5 MG chemo tablet TAKE 10 TABLETS ONCE A WEEK WITH FOOD - NO ALCOHOL AND HOLD IF ON ANTIBIOTICS OR IF ILL      Coenzyme Q10 (COQ-10) 50 MG CAPS Take 50 mg by mouth 2 times daily             Significant Diagnostics:   Radiology: Cta Chest W Wo Contrast    Result Date: 12/1/2020  EXAM: CT SCAN OF THE THORAX WITH CONTRAST/PE PROTOCOL/CTA CHEST COMPARISON: CHEST RADIOGRAPHS, DECEMBER 1, 2020, APRIL 2, 2019. REASON FOR EXAMINATION:  SHORTNESS OF BREATH FOR SEVERAL WEEKS TECHNIQUE: Helical CTA was performed through the chest utilizing 100 mL of Isovue 300 intravenous contrast medium. .  Images were obtained with bolus tracking in order to opacify the pulmonary arteries. Thick section coronal MIP 3D reconstructions were performed on a separate workstation. FINDINGS:  No intraluminal filling defects, pulmonary arterial tree. Thoracic aorta normal in course and caliber. Cardiac size normal. No pericardial effusion. Coronary calcification identified. No hilar, mediastinal, or axillary lymph node enlargement. Limited imaging upper abdomen shows small hiatal hernia. Adrenal glands without anomaly. Right lung shows emphysematous change, with fibrous scarring at right lung apex. Dependent subsegmental atelectatic change, right upper lobe, with mosaic morphology identified. Dependent subsegmental atelectatic change right lower lobe. Focal area of bullous change right lung base. No nodules, masses, pleural effusion. Left lung shows emphysematous change with fibrous scarring at left lung apex. Dependent subsegmental atelectatic change, left lung base. No osteoblastic, no osteolytic lesions. No CT evidence pulmonary embolism. Emphysema. Dependent subsegmental atelectatic changes discussed, right greater than left, with corresponding signs of air trapping, greatest in right lung. All CT scans at this facility use dose modulation, iterative reconstruction, and/or weight based dosing when appropriate to reduce radiation dose to as low as reasonably achievable.      Xr Chest Portable    Result Date: 12/1/2020  EXAMINATION: XR CHEST PORTABLE. DATE AND TIME:12/1/2020 12:12 PM CLINICAL HISTORY: SHORTNESS OF BREATH   SOB  COMPARISONS: FEBRUARY 27, 2019  FINDINGS: Patchy area of increased opacity in the right midlung zone suspicious  for small infiltrate. A Chest x-ray is insensitive in mild or early COVID-19  infection. Furthermore the specificity of chest x-ray findings for COVID-19 pneumonia is not established. Remaining lung fields clear. Heart and mediastinum within normal limits. SUSPECT RIGHT UPPER LUNG ZONE INFILTRATE.        Labs:   Recent Results (from the past 72 hour(s))   SPECIMEN REJECTION    Collection Time: 12/02/20 11:35 AM   Result Value Ref Range    Rejected Test cbc     Reason for Rejection see below    TROPONIN    Collection Time: 12/02/20 12:02 PM   Result Value Ref Range    Troponin 0.067 (HH) 0.000 - 0.010 ng/mL   BASIC METABOLIC PANEL    Collection Time: 12/02/20 12:02 PM   Result Value Ref Range    Sodium 134 (L) 135 - 144 mEq/L    Potassium 4.2 3.4 - 4.9 mEq/L    Chloride 100 95 - 107 mEq/L    CO2 21 20 - 31 mEq/L    Anion Gap 13 9 - 15 mEq/L    Glucose 101 (H) 70 - 99 mg/dL    BUN 24 (H) 8 - 23 mg/dL    CREATININE 0.87 0.70 - 1.20 mg/dL    GFR Non-African American >60.0 >60    GFR  >60.0 >60    Calcium 9.1 8.5 - 9.9 mg/dL   PROTIME-INR    Collection Time: 12/02/20 12:02 PM   Result Value Ref Range    Protime 25.0 (H) 12.3 - 14.9 sec    INR 2.3    CBC    Collection Time: 12/02/20 12:02 PM   Result Value Ref Range    WBC 6.6 4.8 - 10.8 K/uL    RBC 3.46 (L) 4.70 - 6.10 M/uL    Hemoglobin 8.8 (L) 14.0 - 18.0 g/dL    Hematocrit 27.4 (L) 42.0 - 52.0 %    MCV 79.2 (L) 80.0 - 100.0 fL    MCH 25.6 (L) 27.0 - 31.3 pg    MCHC 32.3 (L) 33.0 - 37.0 %    RDW 30.3 (H) 11.5 - 14.5 %    Platelets 799 754 - 625 K/uL   PROCALCITONIN    Collection Time: 12/02/20 12:02 PM   Result Value Ref Range    Procalcitonin 0.30 (H) 0.00 - 0.15 ng/mL   COVID-19    Collection Time: 12/02/20 12:55 PM Result Value Ref Range    SARS-CoV-2, NAAT Not Detected Not Detected   Troponin    Collection Time: 12/02/20  3:36 PM   Result Value Ref Range    Troponin 0.065 (HH) 0.000 - 0.010 ng/mL   Protime-INR    Collection Time: 12/02/20  3:36 PM   Result Value Ref Range    Protime 20.2 (H) 12.3 - 14.9 sec    INR 1.7    EKG 12 lead    Collection Time: 12/03/20 12:45 AM   Result Value Ref Range    Ventricular Rate 92 BPM    Atrial Rate 92 BPM    P-R Interval 148 ms    QRS Duration 82 ms    Q-T Interval 314 ms    QTc Calculation (Bazett) 388 ms    P Axis 61 degrees    R Axis 77 degrees    T Axis 44 degrees   PROTIME-INR    Collection Time: 12/03/20  6:14 AM   Result Value Ref Range    Protime 15.6 (H) 12.3 - 14.9 sec    INR 1.2    MAGNESIUM    Collection Time: 12/03/20  6:14 AM   Result Value Ref Range    Magnesium 1.5 (L) 1.7 - 2.4 mg/dL   CBC    Collection Time: 12/03/20  6:14 AM   Result Value Ref Range    WBC 4.4 (L) 4.8 - 10.8 K/uL    RBC 3.16 (L) 4.70 - 6.10 M/uL    Hemoglobin 8.2 (L) 14.0 - 18.0 g/dL    Hematocrit 25.5 (L) 42.0 - 52.0 %    MCV 80.7 80.0 - 100.0 fL    MCH 25.8 (L) 27.0 - 31.3 pg    MCHC 32.0 (L) 33.0 - 37.0 %    RDW 29.8 (H) 11.5 - 14.5 %    Platelets 085 509 - 198 K/uL   Iron and TIBC    Collection Time: 12/03/20  6:14 AM   Result Value Ref Range    Iron 10 (L) 59 - 158 ug/dL    TIBC 205 178 - 450 ug/dL    Iron Saturation 5 (L) 11 - 46 %   FERRITIN    Collection Time: 12/03/20  6:14 AM   Result Value Ref Range    Ferritin 453.4 (H) 30.0 - 400.0 ng/mL   Culture, Blood 1    Collection Time: 12/03/20  3:24 PM    Specimen: Blood   Result Value Ref Range    Blood Culture, Routine       No Growth to date. Any change in status will be called. Culture, Blood 2    Collection Time: 12/03/20  3:24 PM    Specimen: Blood   Result Value Ref Range    Culture, Blood 2       No Growth to date. Any change in status will be called.    C-REACTIVE PROTEIN    Collection Time: 12/03/20  3:24 PM   Result Value Ref Range .1 (H) 0.0 - 5.0 mg/L   Sedimentation Rate    Collection Time: 12/03/20  3:24 PM   Result Value Ref Range    Sed Rate 116 (H) 0 - 20 mm   Sedimentation Rate    Collection Time: 12/03/20  4:25 PM   Result Value Ref Range    Sed Rate 114 (H) 0 - 20 mm   High sensitivity CRP    Collection Time: 12/03/20  4:25 PM   Result Value Ref Range    CRP High Sensitivity 231.0 (H) 0.0 - 5.0 mg/L   Vitamin B12 & Folate    Collection Time: 12/03/20  4:25 PM   Result Value Ref Range    Vitamin B-12 657 232 - 1245 pg/mL    Folate 12.7 7.3 - 26.1 ng/mL   Urinalysis    Collection Time: 12/03/20  5:02 PM   Result Value Ref Range    Color, UA Yellow Straw/Yellow    Clarity, UA Clear Clear    Glucose, Ur 100 (A) Negative mg/dL    Bilirubin Urine Negative Negative    Ketones, Urine Negative Negative mg/dL    Specific Gravity, UA 1.019 1.005 - 1.030    Blood, Urine Negative Negative    pH, UA 5.5 5.0 - 9.0    Protein,  (A) Negative mg/dL    Urobilinogen, Urine 0.2 <2.0 E.U./dL    Nitrite, Urine Negative Negative    Leukocyte Esterase, Urine Negative Negative   Culture, Urine    Collection Time: 12/03/20  5:02 PM    Specimen: Urine, clean catch   Result Value Ref Range    Urine Culture, Routine No growth 24 hours    Microscopic Urinalysis    Collection Time: 12/03/20  5:02 PM   Result Value Ref Range    Bacteria, UA Negative Negative /HPF    Hyaline Casts, UA 1-3 0 - 5 /HPF    WBC, UA 0-2 0 - 5 /HPF    RBC, UA 0-2 0 - 5 /HPF    Epithelial Cells, UA 0-2 0 - 5 /HPF   CBC    Collection Time: 12/04/20  6:49 AM   Result Value Ref Range    WBC 5.9 4.8 - 10.8 K/uL    RBC 3.17 (L) 4.70 - 6.10 M/uL    Hemoglobin 8.1 (L) 14.0 - 18.0 g/dL    Hematocrit 25.7 (L) 42.0 - 52.0 %    MCV 80.9 80.0 - 100.0 fL    MCH 25.5 (L) 27.0 - 31.3 pg    MCHC 31.5 (L) 33.0 - 37.0 %    RDW 29.6 (H) 11.5 - 14.5 %    Platelets 092 580 - 563 K/uL   BASIC METABOLIC PANEL    Collection Time: 12/04/20  6:49 AM   Result Value Ref Range    Sodium 134 (L) 135 - 144 mEq/L    Potassium 4.0 3.4 - 4.9 mEq/L    Chloride 103 95 - 107 mEq/L    CO2 20 20 - 31 mEq/L    Anion Gap 11 9 - 15 mEq/L    Glucose 96 70 - 99 mg/dL    BUN 22 8 - 23 mg/dL    CREATININE 0.85 0.70 - 1.20 mg/dL    GFR Non-African American >60.0 >60    GFR  >60.0 >60    Calcium 7.9 (L) 8.5 - 9.9 mg/dL   MAGNESIUM    Collection Time: 12/04/20  6:49 AM   Result Value Ref Range    Magnesium 1.6 (L) 1.7 - 2.4 mg/dL   PROTIME-INR    Collection Time: 12/04/20  6:49 AM   Result Value Ref Range    Protime 17.5 (H) 12.3 - 14.9 sec    INR 1.4    CBC    Collection Time: 12/05/20  6:25 AM   Result Value Ref Range    WBC 5.6 4.8 - 10.8 K/uL    RBC 3.32 (L) 4.70 - 6.10 M/uL    Hemoglobin 8.7 (L) 14.0 - 18.0 g/dL    Hematocrit 27.7 (L) 42.0 - 52.0 %    MCV 83.3 80.0 - 100.0 fL    MCH 26.2 (L) 27.0 - 31.3 pg    MCHC 31.4 (L) 33.0 - 37.0 %    RDW 30.6 (H) 11.5 - 14.5 %    Platelets 592 872 - 802 K/uL   BASIC METABOLIC PANEL    Collection Time: 12/05/20  6:25 AM   Result Value Ref Range    Sodium 130 (L) 135 - 144 mEq/L    Potassium 4.8 3.4 - 4.9 mEq/L    Chloride 100 95 - 107 mEq/L    CO2 17 (L) 20 - 31 mEq/L    Anion Gap 13 9 - 15 mEq/L    Glucose 85 70 - 99 mg/dL    BUN 29 (H) 8 - 23 mg/dL    CREATININE 0.88 0.70 - 1.20 mg/dL    GFR Non-African American >60.0 >60    GFR  >60.0 >60    Calcium 8.6 8.5 - 9.9 mg/dL   PROTIME-INR    Collection Time: 12/05/20  6:25 AM   Result Value Ref Range    Protime 21.2 (H) 12.3 - 14.9 sec    INR 1.8            Follow-up visits:   Vijay Bernal MD  [facility] [phone]    On 12/7/2020  at 10am with Rosa Beaulieu NP - please arrive by 9:55am    Bri Rivera MD  [address] [place] [address]  [place] [phone]    In 1 week  tremor    Vanesa Alvarez MD  [address] [place] [address]  [phone]    Schedule an appointment as soon as possible for a visit in 4 weeks         [facility] Diagnosis Date Noted    NSTEMI (non-ST elevated myocardial infarction) ([facility] [I21.4] 12/02/2020     Priority: Low    Anginal equivalent ([facility] [I20.8] 12/01/2020     Priority: Low    Acute chest pain [R07.9] 12/01/2020     Priority: Low     1. NSTEMI- PAtent stents  2. R PNA- Fever. Await ID recs. Pt requesting dc. RA  3.  HTN Stable               Electronically signed by Jasmin Brock MD on 12/5/2020 at 10:39 AM

## 2020-12-05 NOTE — PROGRESS NOTES
Hospitalist Progress Note      PCP: Eduardo Staton MD    Date of Admission: 12/1/2020    Chief Complaint: weakness    Subjective: pt awake/alert, looks comfortable     Medications:  Reviewed    Infusion Medications   Scheduled Medications    dilTIAZem  60 mg Oral 3 times per day    iron sucrose  200 mg Intravenous Q24H    warfarin (COUMADIN) daily dosing (placeholder)   Other RX Placeholder    metoprolol tartrate  100 mg Oral BID    cefTRIAXone (ROCEPHIN) IV  1 g Intravenous Q24H    baclofen  10 mg Oral TID    clopidogrel  75 mg Oral Daily    CoQ-10  50 mg Oral BID    pantoprazole  40 mg Oral Daily    ezetimibe  10 mg Oral Daily    ferrous sulfate  325 mg Oral Daily with breakfast    folic acid  1 mg Oral Daily    gabapentin  100 mg Oral TID    leflunomide  20 mg Oral Daily    lisinopril  10 mg Oral Daily    predniSONE  5 mg Oral Daily    pregabalin  75 mg Oral BID    Tofacitinib Citrate ER  11 mg Oral Daily    magnesium oxide  400 mg Oral BID    sodium chloride flush  10 mL Intravenous 2 times per day    sodium chloride flush  10 mL Intravenous 2 times per day    atorvastatin  80 mg Oral Nightly     PRN Meds: sodium chloride flush, albuterol sulfate HFA, metoprolol, sodium chloride flush, acetaminophen **OR** acetaminophen, polyethylene glycol, promethazine **OR** ondansetron, nitroGLYCERIN      Intake/Output Summary (Last 24 hours) at 12/5/2020 0804  Last data filed at 12/4/2020 0809  Gross per 24 hour   Intake 5 ml   Output --   Net 5 ml       Exam:    BP (!) 118/59   Pulse (!) 37   Temp 100 °F (37.8 °C) (Oral)   Resp 18   Ht 5' 10\" (1.778 m)   Wt 199 lb 3.2 oz (90.4 kg)   SpO2 95%   BMI 28.58 kg/m²     General appearance: No apparent distress, appears stated age and cooperative. HEENT: Pupils equal, round, and reactive to light. Conjunctivae/corneas clear. Neck: Supple, with full range of motion. No jugular venous distention. Trachea midline.   Respiratory:  Normal respiratory effort. Clear to auscultation, bilaterally without Rales/Wheezes/Rhonchi. Cardiovascular: Regular rate and rhythm with normal S1/S2 without murmurs, rubs or gallops. Abdomen: Soft, non-tender, non-distended with normal bowel sounds. Musculoskeletal: R arm tremor   Skin: Skin color, texture, turgor normal.  No rashes or lesions. Neurologic:  Neurovascularly intact without any focal sensory/motor deficits. Cranial nerves: II-XII intact, grossly non-focal.  Psychiatric: Alert and oriented, thought content appropriate, normal insight  Capillary Refill: Brisk,< 3 seconds   Peripheral Pulses: +2 palpable, equal bilaterally       Labs:   Recent Labs     12/03/20  0614 12/04/20  0649 12/05/20  0625   WBC 4.4* 5.9 5.6   HGB 8.2* 8.1* 8.7*   HCT 25.5* 25.7* 27.7*    153 174     Recent Labs     12/02/20  1202 12/04/20  0649 12/05/20  0625   * 134* 130*   K 4.2 4.0 4.8    103 100   CO2 21 20 17*   BUN 24* 22 29*   CREATININE 0.87 0.85 0.88   CALCIUM 9.1 7.9* 8.6     No results for input(s): AST, ALT, BILIDIR, BILITOT, ALKPHOS in the last 72 hours. Recent Labs     12/03/20  0614 12/04/20  0649 12/05/20  0625   INR 1.2 1.4 1.8     Recent Labs     12/02/20  1202 12/02/20  1536   TROPONINI 0.067* 0.065*       Urinalysis:      Lab Results   Component Value Date    NITRU Negative 12/03/2020    WBCUA 0-2 12/03/2020    BACTERIA Negative 12/03/2020    RBCUA 0-2 12/03/2020    BLOODU Negative 12/03/2020    SPECGRAV 1.019 12/03/2020    GLUCOSEU 100 12/03/2020       Radiology:  XR CHEST PORTABLE   Final Result      STABLE MILD PULMONARY INFILTRATES, PREDOMINANTLY OF THE INFERIOR ASPECT OF THE RIGHT UPPER LOBE. CTA CHEST W WO CONTRAST   Final Result      No CT evidence pulmonary embolism. Emphysema. Dependent subsegmental atelectatic changes discussed, right greater than left, with corresponding signs of air trapping, greatest in right lung.                All CT scans at this facility use dose modulation, iterative reconstruction, and/or weight based dosing when appropriate to reduce radiation dose to as low as reasonably achievable. XR CHEST PORTABLE   Final Result   SUSPECT RIGHT UPPER LUNG ZONE INFILTRATE. Assessment/Plan:    Active Hospital Problems    Diagnosis Date Noted    NSTEMI (non-ST elevated myocardial infarction) ([facility] [I21.4] 12/02/2020    Anginal equivalent ([facility] [I20.8] 12/01/2020    Acute chest pain [R07.9] 12/01/2020         DVT Prophylaxis: coumadin  Diet: DIET CARDIAC; No Caffeine  Code Status: Full Code    PT/OT Eval Status:     Dispo -  A fib/RVR/NSTEMI- full anticoag with coumadin, management per cardio pharmacy on case  Fever, pneumonia-better after initiated atbs per ID  HTN-controlled  Obesity with BMI 28%- supportive care  Anemia- severe iron deff.  H/H remained stable, follow up with CBC AM. Initiated IV veniofer -checking stool guaiac   R arm tremor- pt planing to see neurology service after DC  Medically stable, possible DC home today/tomorrow per primary service       Electronically signed by Vikash Brower MD on 12/5/2020 at 8:04 AM

## 2020-12-05 NOTE — DISCHARGE INSTR - COC
Continuity of Care Form    Patient Name: Brendan Nair   :  1950  MRN:  [id number]    [facility] date:  2020  Discharge date:  ***    Code Status Order: Full Code   Advance Directives:   Advance Care Flowsheet Documentation       Date/Time Healthcare Directive Type of Healthcare Directive Copy in [address] Agent's Name Healthcare Agent's Phone Number    [phone]  No, patient does not have an advance directive for healthcare treatment -- -- -- -- --            Admitting Physician:  Michael Muñoz MD  PCP: Tristin Walton MD    Discharging Nurse: [facility] Unit/Room#: M648/G035-13  Discharging Unit Phone Number: ***    Emergency Contact:   Extended Emergency Contact Information  Primary Emergency Contact: Alexandra Camilo   [address] Phone: [phone]  Relation: Spouse    Past Surgical History:  Past Surgical History:   Procedure Laterality Date    COLONOSCOPY N/A 2019    COLONOSCOPY performed by Rae Bernard MD at [facility]  2004    OPEN REDUCTION LEFT LEG    UPPER GASTROINTESTINAL ENDOSCOPY N/A 11/10/2018    EGD ESOPHAGOGASTRODUODENOSCOPY performed by Rae Bernard MD at [address] [place] [address] N/A 4/3/2019    EGD ESOPHAGOGASTRODUODENOSCOPY performed by Rae Bernard MD at [facility]       Immunization History:   Immunization History   Administered Date(s) Administered    DT (pediatric) 2010    Influenza, High Dose (Fluzone 65 yrs and older) 2017, 10/30/2018    PPD Test 2011    Pneumococcal Conjugate 13-valent (Nolon Maxine) 2017       Active Problems:  Patient Active Problem List   Diagnosis Code    Essential hypertension I10    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 ([facility] M05.79    Seasonal allergic rhinitis J30.2    Chronic sinusitis J32.9    Acute blood loss anemia D62    Gastrointestinal ADLs:015399370:::0}  Med Admin  {CHP DME ADLs:541808029:::0}  Med Delivery   { GERALD MED Delivery:637990890:::0}    Wound Care Documentation and Therapy:        Elimination:  Continence: Bowel: {YES / H}  Bladder: {YES / RT:42412}  Urinary Catheter: {Urinary Catheter:399421343:::0}   Colostomy/Ileostomy/Ileal Conduit: {YES / KT:31843}       Date of Last BM: ***    Intake/Output Summary (Last 24 hours) at 2020 0806  Last data filed at 2020 0809  Gross per 24 hour   Intake 5 ml   Output --   Net 5 ml     I/O last 3 completed shifts:   In: 5 [I.V.:5]  Out: -     Safety Concerns:     [employer] Safety Concerns:011788397:::0}    Impairments/Disabilities:      [address] [employer] Impairments/Disabilities:900172298:::0}    Nutrition Therapy:  Current Nutrition Therapy:   [employer] Diet List:780143182:::0}    Routes of Feeding: {CHP DME Other Feedings:658110799:::0}  Liquids: {Slp liquid thickness:52757}  Daily Fluid Restriction: {CHP DME Yes amt example:093041627:::0}  Last Modified Barium Swallow with Video (Video Swallowing Test): {Done Not Done KZDT:485309032:::2}    Treatments at the Time of Hospital Discharge:   Respiratory Treatments: ***  Oxygen Therapy:  {Therapy; copd oxygen:28074:::0}  Ventilator:    { CC Vent List:385595332:::0}    Rehab Therapies: {THERAPEUTIC INTERVENTION:6449808813}  Weight Bearing Status/Restrictions: [employer] Weight Bearin:::0}  Other Medical Equipment (for information only, NOT a DME order):  {EQUIPMENT:258029827}  Other Treatments: ***    Patient's personal belongings (please select all that are sent with patient):  {CHP DME Belongings:561833891:::0}    RN SIGNATURE:  {Esignature:448854107:::0}    CASE MANAGEMENT/SOCIAL WORK SECTION    Inpatient Status Date: ***    Readmission Risk Assessment Score:  Readmission Risk              Risk of Unplanned Readmission:        18           Discharging to Facility/ Agency   Name:   Address:  Phone:  Fax:    Dialysis Facility (if applicable)   Name:  Address:  Dialysis Schedule:  Phone:  Fax:    / signature: {Esignature:219010268:::0}    PHYSICIAN SECTION    Prognosis: Good    Condition at Discharge: Stable    Rehab Potential (if transferring to Rehab): Good    Recommended Labs or Other Treatments After Discharge: INR weekly    Physician Certification: I certify the above information and transfer of Mateo More  is necessary for the continuing treatment of the diagnosis listed and that he requires Home Care for greater 30 days.      Update Admission H&P: No change in H&P    PHYSICIAN SIGNATURE:  Electronically signed by Li Mark MD on 12/5/20 at 8:06 AM EST

## 2020-12-05 NOTE — FLOWSHEET NOTE
Initial assessment, pt sitting up in bed. Pt voicing to the nurse that he wants to go home today. Pt very adamant. Pt denies pain, no resp issues noted. Call bell in reach.

## 2020-12-05 NOTE — PROGRESS NOTES
INPATIENT PROGRESS NOTES    PATIENT NAME: Corona Contreras  MRN: [id number]  SERVICE DATE:  December 5, 2020   SERVICE TIME:  12:02 PM      PRIMARY SERVICE: Pulmonary Disease    CHIEF COMPLAINTS: Shortness of breath    INTERVAL HPI: Patient seen and examined at bedside, Interval Notes, orders reviewed. Nursing notes noted    Patient report feeling better, shortness of breath improved, no chest pain, no coughing, no fever, no nausea no vomiting    Review of system:     GI Abdominal pain No  Skin Rash No    Social History     Tobacco Use    Smoking status: Former Smoker     Packs/day: 1.00     Years: 50.00     Pack years: 50.00     Types: Cigarettes     Last attempt to quit: 11/16/2017     Years since quitting: 3.0    Smokeless tobacco: Never Used   Substance Use Topics    Alcohol use: No         Problem Relation Age of Onset    High Blood Pressure Father     Heart Attack Father     Coronary Art Dis Father          OBJECTIVE    Body mass index is 28.58 kg/m². PHYSICAL EXAM:  Vitals:  BP (!) 118/59   Pulse (!) 37   Temp 100 °F (37.8 °C) (Oral)   Resp 18   Ht 5' 10\" (1.778 m)   Wt 199 lb 3.2 oz (90.4 kg)   SpO2 95%   BMI 28.58 kg/m²     General: alert, cooperative, no distress  Head: normocephalic, atraumatic  Eyes:No gross abnormalities. ENT:  MMM no lesions  Neck:  supple and no masses  Chest : clear to auscultation bilaterally- no wheezes, rales or rhonchi, normal air movement, no respiratory distress  Heart[de-identified] Heart sounds are normal.  Regular rate and rhythm without murmur, gallop or rub. ABD:  symmetric, soft, non-tender  Musculoskeletal : no cyanosis, no clubbing and no edema  Neuro:  Grossly normal  Skin: No rashes or nodules noted.   Lymph node:  no cervical nodes  Urology: No Pillai   Psychiatric: appropriate    DATA:   Recent Labs     12/04/20  0649 12/05/20  0625   WBC 5.9 5.6   HGB 8.1* 8.7*   HCT 25.7* 27.7*   MCV 80.9 83.3    174     Recent Labs     12/04/20  0649 12/05/20  0625   NA 134* 130*   K 4.0 4.8    100   CO2 20 17*   BUN 22 29*   CREATININE 0.85 0.88   GLUCOSE 96 85   CALCIUM 7.9* 8.6   LABGLOM >60.0 >60.0   GFRAA >60.0 >60.0       MV Settings:          No results for input(s): PHART, KVI0BMI, PO2ART, GVB8ZRE, BEART, C2DQFQTH in the last 72 hours. O2 Device: Nasal cannula  O2 Flow Rate (L/min): 2 L/min    DIET CARDIAC; No Caffeine     MEDICATIONS during current hospitalization:    Continuous Infusions:    Scheduled Meds:   dilTIAZem  60 mg Oral 3 times per day    iron sucrose  200 mg Intravenous Q24H    warfarin (COUMADIN) daily dosing (placeholder)   Other RX Placeholder    metoprolol tartrate  100 mg Oral BID    cefTRIAXone (ROCEPHIN) IV  1 g Intravenous Q24H    baclofen  10 mg Oral TID    clopidogrel  75 mg Oral Daily    CoQ-10  50 mg Oral BID    pantoprazole  40 mg Oral Daily    ezetimibe  10 mg Oral Daily    ferrous sulfate  325 mg Oral Daily with breakfast    folic acid  1 mg Oral Daily    gabapentin  100 mg Oral TID    leflunomide  20 mg Oral Daily    lisinopril  10 mg Oral Daily    predniSONE  5 mg Oral Daily    pregabalin  75 mg Oral BID    Tofacitinib Citrate ER  11 mg Oral Daily    magnesium oxide  400 mg Oral BID    sodium chloride flush  10 mL Intravenous 2 times per day    sodium chloride flush  10 mL Intravenous 2 times per day    atorvastatin  80 mg Oral Nightly       PRN Meds:sodium chloride flush, albuterol sulfate HFA, metoprolol, sodium chloride flush, acetaminophen **OR** acetaminophen, polyethylene glycol, promethazine **OR** ondansetron, nitroGLYCERIN    Radiology  Cta Chest W Wo Contrast    Result Date: 12/1/2020  EXAM: CT SCAN OF THE THORAX WITH CONTRAST/PE PROTOCOL/CTA CHEST COMPARISON: CHEST RADIOGRAPHS, DECEMBER 1, 2020, APRIL 2, 2019. REASON FOR EXAMINATION:  SHORTNESS OF BREATH FOR SEVERAL WEEKS TECHNIQUE: Helical CTA was performed through the chest utilizing 100 mL of Isovue 300 intravenous contrast medium. .  Images were obtained with bolus tracking in order to opacify the pulmonary arteries. Thick section coronal MIP 3D reconstructions were performed on a separate workstation. FINDINGS:  No intraluminal filling defects, pulmonary arterial tree. Thoracic aorta normal in course and caliber. Cardiac size normal. No pericardial effusion. Coronary calcification identified. No hilar, mediastinal, or axillary lymph node enlargement. Limited imaging upper abdomen shows small hiatal hernia. Adrenal glands without anomaly. Right lung shows emphysematous change, with fibrous scarring at right lung apex. Dependent subsegmental atelectatic change, right upper lobe, with mosaic morphology identified. Dependent subsegmental atelectatic change right lower lobe. Focal area of bullous change right lung base. No nodules, masses, pleural effusion. Left lung shows emphysematous change with fibrous scarring at left lung apex. Dependent subsegmental atelectatic change, left lung base. No osteoblastic, no osteolytic lesions. No CT evidence pulmonary embolism. Emphysema. Dependent subsegmental atelectatic changes discussed, right greater than left, with corresponding signs of air trapping, greatest in right lung. All CT scans at this facility use dose modulation, iterative reconstruction, and/or weight based dosing when appropriate to reduce radiation dose to as low as reasonably achievable. Xr Chest Portable    Result Date: 12/3/2020  XR CHEST PORTABLE : 12/3/2020 CLINICAL HISTORY:  Right lung pneumonia . COMPARISON: Portable chest and chest CTA 12/1/2020. TECHNIQUE: Two portable upright AP radiographs of the chest were obtained. FINDINGS: Mild mid to lower lung field pulmonary infiltrates predominantly within the inferior aspect of the right upper lobe have not significantly changed from the prior studies. There is no significant pleural effusion, cardiomegaly, vascular congestion, pneumothorax, or displaced fractures identified.      STABLE MILD PULMONARY INFILTRATES, PREDOMINANTLY OF THE INFERIOR ASPECT OF THE RIGHT UPPER LOBE. Xr Chest Portable    Result Date: 12/1/2020  EXAMINATION: XR CHEST PORTABLE. DATE AND TIME:12/1/2020 12:12 PM CLINICAL HISTORY: SHORTNESS OF BREATH   SOB  COMPARISONS: FEBRUARY 27, 2019  FINDINGS: Patchy area of increased opacity in the right midlung zone suspicious  for small infiltrate. A Chest x-ray is insensitive in mild or early COVID-19  infection. Furthermore the specificity of chest x-ray findings for COVID-19 pneumonia is not established. Remaining lung fields clear. Heart and mediastinum within normal limits. SUSPECT RIGHT UPPER LUNG ZONE INFILTRATE. IMPRESSION AND SUGGESTION:  Patient is at risk due to   · Shortness of breath, possibly related to volume overload  · Abnormal CT with peripheral groundglass opacities, concerning for evolving ILD in a patient with underlying immunosuppressive treatment and RA, however other etiologies can include edema, dependent atelectasis, or drug reaction.   Infection is unlikely  · Mild obstructive airway disease, with mild emphysema currently no signs of exacerbation  · Paroxysmal A. fib with RVR  · Rheumatoid arthritis  · History of DVT and PE      Recommendations  · Home O2 eval prior to discharge  · Target O2 88 to 90%  · Patient will need high-resolution CAT scan as outpatient to evaluate interstitial changes  · Patient currently follows up with [facility] clinic rheumatology he would like to do his work-up at [facility], he has appointment with his rheumatologist on the 15th of this month, I discussed with him concerns of interstitial lung disease that needs to be monitored and  further evaluated  · Okay to DC home from pulmonary point of view           Electronically signed by Kiana Enriquez MD,  FCCP   on 12/5/2020 at 12:02 PM

## 2020-12-05 NOTE — PROGRESS NOTES
Spiritual Care Services     Summary of Visit:  PT is waiting for his doctor to go home: \"Hanging in there and hoping to get out of here. I hope and pray,\" he said. I comforted and encouraged him. He said, he doesn't go to any [facility] any more. However, he called for prayers. We prayed and he expressed gratitude for the visit. Spiritual Assessment/Intervention/Outcomes:    Encounter Summary  Services provided to[de-identified] Patient  Referral/Consult From[de-identified] Rounding  Support System: Spouse, Children  Place of Anabaptist: NONE   Contact Synagogue: No  Continue Visiting: No  Complexity of Encounter: Moderate  Length of Encounter: 15 minutes  Spiritual Assessment Completed: Yes  Routine  Type: Initial  Assessment: Approachable, Anxious  Intervention: Prayer, Nurtured hope, Active listening, Explored coping resources, Explored feelings, thoughts, concerns, Discussed belief system/Temple practices/fabrizio  Outcome: Expressed gratitude, Hopeful, Receptive              Advance Directives (For Healthcare)  Pre-existing DNR Comfort Care/DNR Arrest/DNI Order: No  Healthcare Directive: No, patient does not have an advance directive for healthcare treatment           Values / Beliefs  Do you have any ethnic, cultural, sacramental, or spiritual Temple needs you would like us to be aware of while you are in the hospital?: No    Care Plan:        [id number]   Electronically signed by Thiago Valenzuela on 12/5/20 at 12:35 PM EST     To reach a  for emotional and spiritual support, place an [facility] consult request.   If a  is needed immediately, dial 0 and ask to page the on-call .

## 2020-12-05 NOTE — CARE COORDINATION
DR MAHONEY SPOKE WITH PATIENT AND PT DECLINED C AT THIS TIME AND WOULD LIKE  [facility].  NC PLAN TODAY IF OK WITH ID

## 2020-12-06 NOTE — PROGRESS NOTES
Hospitalist Progress Note      PCP: Fredis Nj MD    Date of Admission: 12/1/2020    Chief Complaint: migraine headache    Subjective: pt awake, looks at distress due to headache     Medications:  Reviewed    Infusion Medications   Scheduled Medications    ketorolac  30 mg Intravenous Once    dilTIAZem  60 mg Oral 3 times per day    iron sucrose  200 mg Intravenous Q24H    warfarin (COUMADIN) daily dosing (placeholder)   Other RX Placeholder    metoprolol tartrate  100 mg Oral BID    cefTRIAXone (ROCEPHIN) IV  1 g Intravenous Q24H    baclofen  10 mg Oral TID    clopidogrel  75 mg Oral Daily    CoQ-10  50 mg Oral BID    pantoprazole  40 mg Oral Daily    ezetimibe  10 mg Oral Daily    ferrous sulfate  325 mg Oral Daily with breakfast    folic acid  1 mg Oral Daily    gabapentin  100 mg Oral TID    leflunomide  20 mg Oral Daily    lisinopril  10 mg Oral Daily    predniSONE  5 mg Oral Daily    pregabalin  75 mg Oral BID    Tofacitinib Citrate ER  11 mg Oral Daily    magnesium oxide  400 mg Oral BID    sodium chloride flush  10 mL Intravenous 2 times per day    sodium chloride flush  10 mL Intravenous 2 times per day    atorvastatin  80 mg Oral Nightly     PRN Meds: ketorolac, sodium chloride flush, albuterol sulfate HFA, metoprolol, sodium chloride flush, acetaminophen **OR** acetaminophen, polyethylene glycol, promethazine **OR** ondansetron, nitroGLYCERIN      Intake/Output Summary (Last 24 hours) at 12/6/2020 0926  Last data filed at 12/6/2020 2631  Gross per 24 hour   Intake 660 ml   Output --   Net 660 ml       Exam:    BP (!) 152/67   Pulse 110   Temp 102.7 °F (39.3 °C) (Oral)   Resp 19   Ht 5' 10\" (1.778 m)   Wt 199 lb 3.2 oz (90.4 kg)   SpO2 94%   BMI 28.58 kg/m²     General appearance: No apparent distress, appears stated age and cooperative. HEENT: Pupils equal, round, and reactive to light. Conjunctivae/corneas clear. Neck: Supple, with full range of motion.  No jugular venous distention. Trachea midline. Respiratory:  Normal respiratory effort. Clear to auscultation, bilaterally without Rales/Wheezes/Rhonchi. Cardiovascular: Regular rate and rhythm with normal S1/S2 without murmurs, rubs or gallops. Abdomen: Soft, non-tender, non-distended with normal bowel sounds. Musculoskeletal: No clubbing, cyanosis or edema bilaterally. Full range of motion without deformity. Skin: Skin color, texture, turgor normal.  No rashes or lesions. Neurologic:  Neurovascularly intact without any focal sensory/motor deficits. Cranial nerves: II-XII intact, grossly non-focal.  Psychiatric: Alert and oriented, thought content appropriate, normal insight  Capillary Refill: Brisk,< 3 seconds   Peripheral Pulses: +2 palpable, equal bilaterally       Labs:   Recent Labs     12/04/20  0649 12/05/20  0625 12/06/20  0538   WBC 5.9 5.6 5.8   HGB 8.1* 8.7* 8.1*   HCT 25.7* 27.7* 25.6*    174 179     Recent Labs     12/04/20  0649 12/05/20  0625   * 130*   K 4.0 4.8    100   CO2 20 17*   BUN 22 29*   CREATININE 0.85 0.88   CALCIUM 7.9* 8.6     No results for input(s): AST, ALT, BILIDIR, BILITOT, ALKPHOS in the last 72 hours. Recent Labs     12/04/20  0649 12/05/20  0625 12/06/20  0538   INR 1.4 1.8 2.5     No results for input(s): CKTOTAL, TROPONINI in the last 72 hours. Urinalysis:      Lab Results   Component Value Date    NITRU Negative 12/03/2020    WBCUA 0-2 12/03/2020    BACTERIA Negative 12/03/2020    RBCUA 0-2 12/03/2020    BLOODU Negative 12/03/2020    SPECGRAV 1.019 12/03/2020    GLUCOSEU 100 12/03/2020       Radiology:  XR CHEST PORTABLE   Final Result      STABLE MILD PULMONARY INFILTRATES, PREDOMINANTLY OF THE INFERIOR ASPECT OF THE RIGHT UPPER LOBE. CTA CHEST W WO CONTRAST   Final Result      No CT evidence pulmonary embolism. Emphysema.       Dependent subsegmental atelectatic changes discussed, right greater than left, with corresponding signs of air trapping, greatest in right lung. All CT scans at this facility use dose modulation, iterative reconstruction, and/or weight based dosing when appropriate to reduce radiation dose to as low as reasonably achievable. XR CHEST PORTABLE   Final Result   SUSPECT RIGHT UPPER LUNG ZONE INFILTRATE.                           Assessment/Plan:    Active Hospital Problems    Diagnosis Date Noted    NSTEMI (non-ST elevated myocardial infarction) ([facility] [address] [I21.4] 12/02/2020    Anginal equivalent ([facility] [address] [I20.8] 12/01/2020    Acute chest pain [R07.9] 12/01/2020         DVT Prophylaxis: coumadin  Diet: DIET CARDIAC; No Caffeine  Code Status: Full Code    PT/OT Eval Status:     Dispo - A fib/RVR/NSTEMI- full anticoag with coumadin, management per cardio pharmacy on case  Fever, pneumonia-persist, initiated atbs per ID  HTN-controlled  Obesity with BMI 28%- supportive care  Anemia- severe iron deff. H/H remained stable, follow up with CBC AM. Initiated IV veniofer -checking stool guaiac   R arm tremor- pt planing to see neurology service after DC  Migraine  headache- toradol IV, follow up clinically    Medically stable, possible DC home today/tomorrow per primary service       Electronically signed by Hunter Jewell MD on 12/6/2020 at 9:26 AM

## 2020-12-06 NOTE — PROGRESS NOTES
12/06/20  0538   WBC 5.6 5.8   HGB 8.7* 8.1*   HCT 27.7* 25.6*   MCV 83.3 81.5    179     Recent Labs     12/04/20  0649 12/05/20  0625   * 130*   K 4.0 4.8    100   CO2 20 17*   BUN 22 29*   CREATININE 0.85 0.88   GLUCOSE 96 85   CALCIUM 7.9* 8.6   LABGLOM >60.0 >60.0   GFRAA >60.0 >60.0       MV Settings:          No results for input(s): PHART, IXY1OHA, PO2ART, WKZ8LZA, BEART, V4IKCNIF in the last 72 hours. O2 Device: Nasal cannula  O2 Flow Rate (L/min): 2 L/min    DIET CARDIAC; No Caffeine     MEDICATIONS during current hospitalization:    Continuous Infusions:    Scheduled Meds:   dilTIAZem  60 mg Oral 3 times per day    iron sucrose  200 mg Intravenous Q24H    warfarin (COUMADIN) daily dosing (placeholder)   Other RX Placeholder    metoprolol tartrate  100 mg Oral BID    cefTRIAXone (ROCEPHIN) IV  1 g Intravenous Q24H    baclofen  10 mg Oral TID    clopidogrel  75 mg Oral Daily    CoQ-10  50 mg Oral BID    pantoprazole  40 mg Oral Daily    ezetimibe  10 mg Oral Daily    ferrous sulfate  325 mg Oral Daily with breakfast    folic acid  1 mg Oral Daily    gabapentin  100 mg Oral TID    leflunomide  20 mg Oral Daily    lisinopril  10 mg Oral Daily    predniSONE  5 mg Oral Daily    pregabalin  75 mg Oral BID    Tofacitinib Citrate ER  11 mg Oral Daily    magnesium oxide  400 mg Oral BID    sodium chloride flush  10 mL Intravenous 2 times per day    sodium chloride flush  10 mL Intravenous 2 times per day    atorvastatin  80 mg Oral Nightly       PRN Meds:ketorolac, sodium chloride flush, albuterol sulfate HFA, metoprolol, sodium chloride flush, acetaminophen **OR** acetaminophen, polyethylene glycol, promethazine **OR** ondansetron, nitroGLYCERIN    Radiology  Cta Chest W Wo Contrast    Result Date: 12/1/2020  EXAM: CT SCAN OF THE THORAX WITH CONTRAST/PE PROTOCOL/CTA CHEST COMPARISON: CHEST RADIOGRAPHS, DECEMBER 1, 2020, APRIL 2, 2019.  REASON FOR EXAMINATION:  SHORTNESS OF BREATH FOR SEVERAL WEEKS TECHNIQUE: Helical CTA was performed through the chest utilizing 100 mL of Isovue 300 intravenous contrast medium. .  Images were obtained with bolus tracking in order to opacify the pulmonary arteries. Thick section coronal MIP 3D reconstructions were performed on a separate workstation. FINDINGS:  No intraluminal filling defects, pulmonary arterial tree. Thoracic aorta normal in course and caliber. Cardiac size normal. No pericardial effusion. Coronary calcification identified. No hilar, mediastinal, or axillary lymph node enlargement. Limited imaging upper abdomen shows small hiatal hernia. Adrenal glands without anomaly. Right lung shows emphysematous change, with fibrous scarring at right lung apex. Dependent subsegmental atelectatic change, right upper lobe, with mosaic morphology identified. Dependent subsegmental atelectatic change right lower lobe. Focal area of bullous change right lung base. No nodules, masses, pleural effusion. Left lung shows emphysematous change with fibrous scarring at left lung apex. Dependent subsegmental atelectatic change, left lung base. No osteoblastic, no osteolytic lesions. No CT evidence pulmonary embolism. Emphysema. Dependent subsegmental atelectatic changes discussed, right greater than left, with corresponding signs of air trapping, greatest in right lung. All CT scans at this facility use dose modulation, iterative reconstruction, and/or weight based dosing when appropriate to reduce radiation dose to as low as reasonably achievable. Xr Chest Portable    Result Date: 12/3/2020  XR CHEST PORTABLE : 12/3/2020 CLINICAL HISTORY:  Right lung pneumonia . COMPARISON: Portable chest and chest CTA 12/1/2020. TECHNIQUE: Two portable upright AP radiographs of the chest were obtained.  FINDINGS: Mild mid to lower lung field pulmonary infiltrates predominantly within the inferior aspect of the right upper lobe have not significantly changed from the prior studies. There is no significant pleural effusion, cardiomegaly, vascular congestion, pneumothorax, or displaced fractures identified. STABLE MILD PULMONARY INFILTRATES, PREDOMINANTLY OF THE INFERIOR ASPECT OF THE RIGHT UPPER LOBE. Xr Chest Portable    Result Date: 12/1/2020  EXAMINATION: XR CHEST PORTABLE. DATE AND TIME:12/1/2020 12:12 PM CLINICAL HISTORY: SHORTNESS OF BREATH   SOB  COMPARISONS: FEBRUARY 27, 2019  FINDINGS: Patchy area of increased opacity in the right midlung zone suspicious  for small infiltrate. A Chest x-ray is insensitive in mild or early COVID-19  infection. Furthermore the specificity of chest x-ray findings for COVID-19 pneumonia is not established. Remaining lung fields clear. Heart and mediastinum within normal limits. SUSPECT RIGHT UPPER LUNG ZONE INFILTRATE. Chest x-ray shows nonspecific groundglass changes      IMPRESSION AND SUGGESTION:  Patient is at risk due to   · Shortness of breath, possibly related to volume overload, improving  · Abnormal CT with peripheral groundglass opacities, concerning for evolving ILD in a patient with underlying immunosuppressive treatment and RA, however other etiologies can include edema, dependent atelectasis, or drug reaction. Infection is unlikely  · Mild obstructive airway disease, with mild emphysema currently no signs of exacerbation  · Paroxysmal A. fib with RVR  · Rheumatoid arthritis  · History of DVT and PE, on Coumadin  · Headache, consistent with migraine headache      Recommendations  · Home O2 eval prior to discharge  · Target O2 88 to 90%  · Patient will need high-resolution CAT scan as outpatient to evaluate interstitial changes  · Repeat procalcitonin    · Antibiotics per ID  · Sumatriptan x1  · As needed Excedrin and follow-up response    REID RN     I spent 30 min with this patient, greater the 50% of this time was spent in counseling and/or coordinating of care.                Electronically signed by Aruna Car Zara Bautista MD,  FCCP   on 12/6/2020 at 12:54 PM

## 2020-12-06 NOTE — PROGRESS NOTES
Infectious Disease     Patient Name: Paradise Spaulding  Date: 12/6/2020  YOB: 1950  Medical Record Number: [id number]        Right upper lung pneumonia        X-ray shows a faint infiltrate right upper lung on 12/1/2020  CT scan some questionable infiltrate right lung like this was in both lung bases    Blood cultures are negative so far    Procalcitonin elevated 0.3  Blood cell count 4400  COVID-19  X 2 testing negative    90%  sat        Fevers back up again today 102.7    Review of Systems   Constitutional: Positive for fever. Eyes: Negative. Respiratory: Positive for cough and shortness of breath. Cardiovascular: Negative. Gastrointestinal: Negative. Physical Exam   Constitutional: He is oriented to person, place, and time. Cardiovascular: Normal heart sounds. No murmur heard. Pulmonary/Chest: Breath sounds normal. No respiratory distress. He has no wheezes. He has no rales. Abdominal: Soft. Bowel sounds are normal. He exhibits no distension and no mass. There is no abdominal tenderness. There is no rebound. Neurological: He is alert and oriented to person, place, and time. Blood pressure (!) 152/67, pulse 110, temperature 102.7 °F (39.3 °C), temperature source Oral, resp. rate 19, height 5' 10\" (1.778 m), weight 199 lb 3.2 oz (90.4 kg), SpO2 94 %.       .   Lab Results   Component Value Date    WBC 5.8 12/06/2020    HGB 8.1 (L) 12/06/2020    HCT 25.6 (L) 12/06/2020    MCV 81.5 12/06/2020     12/06/2020     Lab Results   Component Value Date     12/05/2020    K 4.8 12/05/2020    K 4.6 08/30/2019     12/05/2020    CO2 17 12/05/2020    BUN 29 12/05/2020    CREATININE 0.88 12/05/2020    GLUCOSE 85 12/05/2020    GLUCOSE 95 04/26/2012    CALCIUM 8.6 12/05/2020      Microscopic Urinalysis [[id number]]  Collected: 12/03/20 1702         Updated: 12/03/20 2008        Bacteria, UA  Negative  /HPF         Hyaline Casts, UA  1-3  /HPF         WBC, UA  0-2 /HPF         RBC, UA  0-2  /HPF         Epithelial Cells, UA  0-2  /HPF         High sensitivity CRP [[id number]] (Abnormal)  Collected: 12/03/20 1625        Specimen: Blood  Updated: 12/03/20 1914        CRP High Sensitivity  231.0  mg/L         Blood cultures remain no growth      ASSESSMENT  PLAN:    Possible right lung pneumonia  Not responding to Rocephin with fevers remaining elevated  Possible atypical pneumonia versus COVID-19    Add doxycycline    Repeat chest x-ray repeat blood cultures repeat COVID-19

## 2020-12-06 NOTE — PROGRESS NOTES
Date: 2020  Patient Name: Ana Laura Landaverde        MRN: [id number]  Account: [de-identified]   : 1950  (78 y.o.)  Room: [place] [id number]    Orders received on this patient. Pt. is Covid-19 positive. Per facility policy, chart reviewed thoroughly. Collaborated with RN to determine appropriateness and ability to tolerate therapy. Patient not seen 2° to: Per RN, pending final r/o of Covid 19. To preserve PPE, will hold OT eval at this time as pt. is mobile to the bathroom. Spoke to [employer], RN aware. Will attempt again when able.     Electronically signed by COLTEN Zurita on 2020 at 3:09 PM

## 2020-12-06 NOTE — PROGRESS NOTES
H POA Recent  Chest pain with sweats A CE+ Loaded with plavix  for cath with Dr Chris Lin yesterday PCI QIAN to RCA P Continue ASA, Plavix and xarelot for 1 month then DC ASA,continue on  BB and increased lipitor dose    History of pulmonary embolism     Hyperlipidemia     Hypertension     Osteoarthritis     Paroxysmal atrial fibrillation (HCC)     RA (rheumatoid arthritis) (HCC)     [facility] Dr. Jonah Tucker Rheumatoid arthritis(714.0)      Past Surgical History:   Procedure Laterality Date    COLONOSCOPY N/A 4/4/2019    COLONOSCOPY performed by Shaylee Guardado MD at [address]  2004    OPEN REDUCTION LEFT LEG    UPPER GASTROINTESTINAL ENDOSCOPY N/A 11/10/2018    EGD ESOPHAGOGASTRODUODENOSCOPY performed by Shaylee Guardado MD at [address] [facility] N/A 4/3/2019    EGD ESOPHAGOGASTRODUODENOSCOPY performed by Shaylee Guardado MD at [address] [facility]  Restrictions/Precautions: Fall Risk    SUBJECTIVE   General  Chart Reviewed: Yes  Subjective  Subjective: Pt reports having a Migraine and a low grade fever this morning, feeling better now. Pre-Session Pain Report     Pain Screening  Patient Currently in Pain: Yes         Pain Assessment  Pain Level: 7  Pain Location: Generalized  Pain Descriptors: Aching      No change in pain after session; declined intevention      OBJECTIVE        Bed mobility  Rolling to Right: Modified independent  Supine to Sit: Modified independent  Sit to Supine: Modified independent    Transfers  Sit to Stand: Supervision  Stand to sit: Supervision    Ambulation  Ambulation?: Yes  Ambulation 1  Surface: level tile  Device: No Device  Assistance: Contact guard assistance;Stand by assistance  Quality of Gait: Mild unsteadiness, path deviation wiht lateral sway  Distance: 30ft with turns                    Exercises  Comments: Standing: squats, hamstring curls, marching, HR x10 with support                               ASSESSMENT   Assessment:  Tx focused on standing and ambulation to improve endurance and strength. Inconsistent O2 readings with pt SOB. Improved with proper breathing exercises. Gait requiring CG/SBA due to mild unsteadiness more so with changing directions. Prognosis: Good     Discharge Recommendations:  Continue to assess pending progress, Patient would benefit from continued therapy after discharge    Goals  Short term goals  Short term goal 1: Pt to complete HEP with indep (sink exes)  Long term goals  Long term goal 1: Pt to complete bed mobility with indep  Long term goal 2: Pt to complete transfers with indep  Long term goal 3: Pt to ambulate 50-150ft with LRD and indep  Long term goal 4: Pt to manage 3 steps without HR indep    PLAN    Times per week: 3-6  Safety Devices  Type of devices: All fall risk precautions in place     AMPAC (6 CLICK) BASIC MOBILITY  AM-PAC Inpatient Mobility Raw Score : 22     Therapy Time   Individual   Time In 1400   Time Out 1425   Minutes 25     Timed Code Treatment Minutes: 25 Minutes   Gait: 13  There ex: 12    Susan Lau PTA, 12/06/20 at 3:17 PM    Electronically signed by Susan Lau PTA on 12/6/2020 at 3:18 PM      Definitions for assistance levels  Independent = pt does not require any physical supervision or assistance from another person for activity completion. Device may be needed.   Stand by assistance = pt requires verbal cues or instructions from another person, close to but not touching, to perform the activity  Minimal assistance= pt performs 75% or more of the activity; assistance is required to complete the activity  Moderate assistance= pt performs 50% of the activity; assistance is required to complete the activity  Maximal assistance = pt performs 25% of the activity; assistance is required to complete the activity  Dependent = pt requires total physical assistance to accomplish the task

## 2020-12-06 NOTE — FLOWSHEET NOTE
Pts hr in the 150's per monitor room. Nurse in to check on the pt and he was just coming back from the br. Pt did not have his oxygen on and his o2 sat was low. Pt also febrile at this time and complaining of a pounding headache. Pt encouraged to wear his oxygen when getting up. Pt does have a moist np cough. Call bell in reach. [phone]  Dr Cristina Perdomo here and updated him on pts current status. 1100  Pt still complaining of headache. Pt did have a dose of Toradol earlier this am but hasnt had much relief. 1500  Nurse in to do repeat covid test as ordered. Pt states he is feeling a little bit better at this time. Pt was medicated with the po migraine medicine. 1800  Pt watching tv. Pt states headache is gone entirely. Hr 80's.

## 2020-12-06 NOTE — PROGRESS NOTES
Progress Note  Patient: Tegan Breen  Unit/Bed: X873/H248-06  YOB: 1950  MRN: [id number]  Acct: [de-identified]   Admitting Diagnosis: Anginal equivalent [facility]) [I20.8]  NSTEMI (non-ST elevated myocardial infarction) [facility]) [I21.4]  Admit Date:  12/1/2020  Hospital Day: 4    Chief Complaint: PNA Fever CAD AF    Histories:  Past Medical History:   Diagnosis Date    CAD S/P percutaneous coronary angioplasty     Depression 2007    History of DVT (deep vein thrombosis)     History of non-ST elevation myocardial infarction (NSTEMI) 10/19/2018    Overview:  H POA Recent  Chest pain with sweats A CE+ Loaded with plavix  for cath with Dr Jess Calderon yesterday PCI QIAN to RCA P Continue ASA, Plavix and xarelot for 1 month then DC ASA,continue on  BB and increased lipitor dose    History of pulmonary embolism     Hyperlipidemia     Hypertension     Osteoarthritis     Paroxysmal atrial fibrillation (HCC)     RA (rheumatoid arthritis) ([facility])     [facility] Dr. Anthony Vargas Rheumatoid arthritis(714.0)      Past Surgical History:   Procedure Laterality Date    COLONOSCOPY N/A 4/4/2019    COLONOSCOPY performed by Alex Padron MD at [address]  2004    OPEN REDUCTION LEFT LEG    UPPER GASTROINTESTINAL ENDOSCOPY N/A 11/10/2018    EGD ESOPHAGOGASTRODUODENOSCOPY performed by Alex Padron MD at [facility] N/A 4/3/2019    EGD ESOPHAGOGASTRODUODENOSCOPY performed by Alex Padron MD at [facility]     Family History   Problem Relation Age of Onset    High Blood Pressure Father     Heart Attack Father     Coronary Art Dis Father      Social History     Socioeconomic History    Marital status:      Spouse name: None    Number of children: None    Years of education: None    Highest education level: None   Occupational History    None   Social Needs    Financial resource strain: None    Food insecurity     Worry: None     Inability: None    Transportation needs 04/26/2012    CALCIUM 8.6 12/05/2020    BILITOT 0.5 12/01/2020    ALKPHOS 38 12/01/2020    AST 45 12/01/2020    ALT 33 12/01/2020     BMP:    Lab Results   Component Value Date     12/05/2020    K 4.8 12/05/2020    K 4.6 08/30/2019     12/05/2020    CO2 17 12/05/2020    BUN 29 12/05/2020    LABALBU 3.0 12/01/2020    LABALBU 4.5 04/26/2012    CREATININE 0.88 12/05/2020    CALCIUM 8.6 12/05/2020    GFRAA >60.0 12/05/2020    LABGLOM >60.0 12/05/2020    GLUCOSE 85 12/05/2020    GLUCOSE 95 04/26/2012     Magnesium:    Lab Results   Component Value Date    MG 1.6 12/04/2020     Troponin:    Lab Results   Component Value Date    TROPONINI 0.065 12/02/2020        Active Hospital Problems    Diagnosis Date Noted    NSTEMI (non-ST elevated myocardial infarction) ([facility] [address] [I21.4] 12/02/2020     Priority: Low    Anginal equivalent ([facility] [address] [I20.8] 12/01/2020     Priority: Low    Acute chest pain [R07.9] 12/01/2020     Priority: Low        Assessment/Plan:    1. NSTEMI- Patent stents  2. R PNA- Fever. conitnue iv ABX  3. Rheumatoid Arthriotis  4.  HTN Stable     Electronically signed by Lupis Robledo MD on 12/6/2020 at 10:02 AM

## 2020-12-07 NOTE — PROGRESS NOTES
Physical Therapy Med Surg Daily Treatment Note  Facility/Department: [facility]  Room: [facility]/X011-13       NAME: Neris Briscoe  : 1950 (78 y.o.)  MRN: [id number]  CODE STATUS: Full Code    Date of Service: 2020    Patient Diagnosis(es): Anginal equivalent ([facility] [address] [I20.8]  NSTEMI (non-ST elevated myocardial infarction) [facility]) [I21.4]   Chief Complaint   Patient presents with    Shortness of Breath     for last  two weeks.       Patient Active Problem List    Diagnosis Date Noted    Anemia due to acute blood loss      Priority: High    Gastrointestinal hemorrhage      Priority: High    NSTEMI (non-ST elevated myocardial infarction) ([address] 2020    Anginal equivalent ([address] 2020    Acute chest pain 2020    Herpes zoster with complication     Gastrointestinal hemorrhage with melena 2019    GI bleed 2019    Ventricular tachycardia ([address] 2019    Acute blood loss anemia 2018    Lipid disorder 10/22/2018    Post PTCA 10/22/2018    History of non-ST elevation myocardial infarction (NSTEMI) 10/19/2018    Paroxysmal atrial fibrillation with RVR ([address] 2017    PE (pulmonary thromboembolism) ([address] 2017    Long-term use of high-risk medication 2017    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    Methotrexate, long term, current use 02/10/2017    Vitamin D deficiency 2015    Nicotine dependence, uncomplicated     Synovitis 2013    Chronic sinusitis 2012    Seasonal allergic rhinitis 2012    Essential hypertension     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 ([address]     Depression 2007        Past Medical History:   Diagnosis Date    CAD S/P percutaneous coronary angioplasty     Depression     History of DVT (deep vein thrombosis)     History of non-ST elevation myocardial infarction (NSTEMI) 10/19/2018    Overview:

## 2020-12-07 NOTE — PROGRESS NOTES
Clinical Pharmacy Note    Warfarin consult follow-up    Recent Labs     12/07/20  0558   INR 2.9     Recent Labs     12/05/20  0625 12/06/20  0538 12/07/20  0558   HGB 8.7* 8.1* 7.3*   HCT 27.7* 25.6* 22.9*    179 174       Significant drug:drug interactions:  Acetaminophen, ketorolac, doxycycline, ceftriaxone, clopidogrel, leflunomide, prednisone    Notes:  Date INR Warfarin Dose    12/02/20  1.7  10 mg Vit K IV am   7.5 mg pm    12/03/20  1.2  5 mg    12/04/20  1.4  5 mg     12/05/20  1.8  2 mg    12/06/20  2.5  0.5 mg    12/07/20  2.9   HOLD                              Today's INR at 2.9 is SUPRAtherapeutic for Afib, goal range 1.5 - 2.5. Will HOLD today's warfarin dose and continue to monitor INR. Daily PT/INR until stable within therapeutic range.

## 2020-12-07 NOTE — PROGRESS NOTES
Physician Progress Note      PATIENT:               Ulysses Kallman  CSN #:                  [id number]  :                       1950  ADMIT DATE:       2020 11:44 AM  DISCH DATE:  RESPONDING  PROVIDER #:        Rogerio BENITEZ MD          QUERY TEXT:    Pt admitted with NSTEMI. Pt noted to have PNA and fever with elevated lactic   acid, procalcitonin and CRP along with tachycardia, tachypnea. If possible,   please document in the progress notes and discharge summary if you are   evaluating and /or treating any of the following: The medical record reflects the following:  Risk Factors: NSTEMI  Clinical Indicators: SOB, 12/3 developed fever, 102.6-391-/94-90% RA;   lactic acid 3.4, .1; Procalcitonin . 3; HR , RR 13-23; Dr. Horne Million" CT chest shows patchy increased infiltration in the right upper lung   areas and bibasilar atelectasis\"; ID: RUL pneumonia;  Treatment: Pulmonology & ID consults, IV Rocephin, duonebs, CT chest, CXR,   labs and monitoring  Options provided:  -- Sepsis, not present on admission,  -- No Sepsis, PNA only  -- Sepsis was ruled out  -- Sepsis and PNA ruled out after study  -- Other - I will add my own diagnosis  -- Disagree - Not applicable / Not valid  -- Disagree - Clinically unable to determine / Unknown  -- Refer to Clinical Documentation Reviewer    PROVIDER RESPONSE TEXT:    This patient has PNA only, patient is not septic.     Query created by: Maddy Jackson on 2020 12:01 PM      Electronically signed by:  Diana Olivares MD 2020 12:35 PM

## 2020-12-07 NOTE — PROGRESS NOTES
Hospitalist Progress Note      Date of Admission: 12/1/2020  Chief Complaint:    Chief Complaint   Patient presents with    Shortness of Breath     for last  two weeks. Subjective:  No new complaints.   No nausea, vomiting, chest pain, or headache      Medications:    Infusion Medications   Scheduled Medications    SUMAtriptan  6 mg Subcutaneous Once    pill splitter   Does not apply Once    doxycycline (VIBRAMYCIN) IV  100 mg Intravenous Q12H    dilTIAZem  60 mg Oral 3 times per day    warfarin (COUMADIN) daily dosing (placeholder)   Other RX Placeholder    metoprolol tartrate  100 mg Oral BID    cefTRIAXone (ROCEPHIN) IV  1 g Intravenous Q24H    baclofen  10 mg Oral TID    clopidogrel  75 mg Oral Daily    CoQ-10  50 mg Oral BID    pantoprazole  40 mg Oral Daily    ezetimibe  10 mg Oral Daily    ferrous sulfate  325 mg Oral Daily with breakfast    folic acid  1 mg Oral Daily    gabapentin  100 mg Oral TID    leflunomide  20 mg Oral Daily    lisinopril  10 mg Oral Daily    predniSONE  5 mg Oral Daily    pregabalin  75 mg Oral BID    Tofacitinib Citrate ER  11 mg Oral Daily    magnesium oxide  400 mg Oral BID    sodium chloride flush  10 mL Intravenous 2 times per day    sodium chloride flush  10 mL Intravenous 2 times per day    atorvastatin  80 mg Oral Nightly     PRN Meds: ketorolac, aspirin-acetaminophen-caffeine, sodium chloride flush, albuterol sulfate HFA, metoprolol, sodium chloride flush, acetaminophen **OR** acetaminophen, polyethylene glycol, promethazine **OR** ondansetron, nitroGLYCERIN    Intake/Output Summary (Last 24 hours) at 12/7/2020 1605  Last data filed at 12/7/2020 1300  Gross per 24 hour   Intake 1610 ml   Output --   Net 1610 ml     Exam:  /71   Pulse 99   Temp 98.5 °F (36.9 °C) (Oral)   Resp 20   Ht 5' 10\" (1.778 m)   Wt 199 lb 3.2 oz (90.4 kg)   SpO2 97%   BMI 28.58 kg/m²   Head: Normocephalic, atraumatic  Sclera clear  Neck JVD flat  Lungs: normal effort of breathing    Labs:   Recent Labs     12/05/20 0625 12/06/20 0538 12/07/20  0558   WBC 5.6 5.8 6.6   HGB 8.7* 8.1* 7.3*   HCT 27.7* 25.6* 22.9*    179 174     Recent Labs     12/05/20 0625 12/07/20  0558   * 128*   K 4.8 4.4    98   CO2 17* 18*   BUN 29* 39*   CREATININE 0.88 1.32*   CALCIUM 8.6 7.7*   PHOS  --  1.8*   AST  --  47*   ALT  --  35   BILIDIR  --  <0.2   BILITOT  --  0.3   ALKPHOS  --  48     Recent Labs     12/05/20 0625 12/06/20 0538 12/07/20  0558   INR 1.8 2.5 2.9     No results for input(s): Albino Ng in the last 72 hours. Radiology:  XR CHEST PORTABLE   Final Result   THERE IS INCREASING MULTIFOCAL TO COALESCENT AIRSPACE DISEASE WITHIN THE RIGHT UPPER RIGHT MIDDLE AND RIGHT LOWER LOBES AS COMPARED TO THE PRIOR EXAMINATION. COULD CONSIDER CT SCAN THE CHEST WITHOUT IV CONTRAST TO FURTHER EVALUATE IF CLINICALLY INDICATED      XR CHEST PORTABLE   Final Result      STABLE MILD PULMONARY INFILTRATES, PREDOMINANTLY OF THE INFERIOR ASPECT OF THE RIGHT UPPER LOBE. CTA CHEST W WO CONTRAST   Final Result      No CT evidence pulmonary embolism. Emphysema. Dependent subsegmental atelectatic changes discussed, right greater than left, with corresponding signs of air trapping, greatest in right lung. All CT scans at this facility use dose modulation, iterative reconstruction, and/or weight based dosing when appropriate to reduce radiation dose to as low as reasonably achievable. XR CHEST PORTABLE   Final Result   SUSPECT RIGHT UPPER LUNG ZONE INFILTRATE. Assessment/Plan:    Shortness of breath might be secondary to interstitial pneumonia either from autoimmune disease or drug-induced secondary to leflunomide and methotrexate. Patient currently not receiving either medication. Recommend continuing only with prednisone. No significant left shift from the time of admission.     Can consider holding antibiotics and following up with rheumatologist if fio2 requirements stay stable.      35 minutes total care time, >1/2 in unit/floor time and care coordination      Thiago José MD

## 2020-12-07 NOTE — PROGRESS NOTES
Progress Note  Patient: Mateo Cutler  Unit/Bed: C086/T004-90  YOB: 1950  MRN: [id number]  Acct: [de-identified]   Admitting Diagnosis: Anginal equivalent [facility]) [I20.8]  NSTEMI (non-ST elevated myocardial infarction) [facility]) [I21.4]  Date:  12/1/2020  Hospital Day: 5    Chief Complaint:  Chest pain, elevated temperature    Subjective  Feeling slightly better. Temperature 100.2 SpO2 is 90%. Dr Frank Garces his rheumatologist at [facility]. Needs to make an appointment to see after discharge cardiac status stable. Appetite slightly improved. Oxygen still not optimal            Output by Drain (mL) 12/05/20 0701 - 12/05/20 1500 12/05/20 1501 - 12/05/20 2300 12/05/20 2301 - 12/06/20 0700 12/06/20 0701 - 12/06/20 1500 12/06/20 1501 - 12/06/20 2300 12/06/20 2301 - 12/07/20 0700 12/07/20 0701 - 12/07/20 1348   Patient has no LDAs of requested type attached. Output by Drain (mL) 12/05/20 0701 - 12/05/20 1500 12/05/20 1501 - 12/05/20 2300 12/05/20 2301 - 12/06/20 0700 12/06/20 0701 - 12/06/20 1500 12/06/20 1501 - 12/06/20 2300 12/06/20 2301 - 12/07/20 0700 12/07/20 0701 - 12/07/20 1348   Patient has no LDAs of requested type attached. Review of Systems:   Review of Systems   Constitutional: Negative for activity change, appetite change, diaphoresis, fatigue and unexpected weight change. HENT: Negative for facial swelling, nosebleeds, trouble swallowing and voice change. Respiratory: Negative for apnea, chest tightness, shortness of breath and wheezing. Cardiovascular: Negative for chest pain, palpitations and leg swelling. Gastrointestinal: Negative for abdominal distention, anal bleeding, blood in stool, nausea and vomiting. Genitourinary: Negative for decreased urine volume and dysuria. Musculoskeletal: Negative for gait problem and myalgias. Skin: Negative for color change, pallor, rash and wound.    Neurological: Negative for dizziness, syncope, facial asymmetry, weakness, light-headedness, numbness and headaches. Hematological: Does not bruise/bleed easily. Psychiatric/Behavioral: Negative for agitation, behavioral problems, confusion, hallucinations and suicidal ideas. The patient is not nervous/anxious. All other systems reviewed and are negative. Physical Examination:    BP (!) 110/57   Pulse 104   Temp 100.2 °F (37.9 °C) (Oral)   Resp 20   Ht 5' 10\" (1.778 m)   Wt 199 lb 3.2 oz (90.4 kg)   SpO2 90%   BMI 28.58 kg/m²    Physical Exam  Constitutional:       Appearance: He is well-developed. HENT:      Head: Normocephalic and atraumatic. Right Ear: External ear normal.      Left Ear: External ear normal.   Eyes:      Conjunctiva/sclera: Conjunctivae normal.      Pupils: Pupils are equal, round, and reactive to light. Neck:      Musculoskeletal: Normal range of motion and neck supple. Cardiovascular:      Rate and Rhythm: Normal rate and regular rhythm. Heart sounds: Normal heart sounds. Pulmonary:      Effort: Pulmonary effort is normal.      Breath sounds: Normal breath sounds. Abdominal:      General: Bowel sounds are normal.      Palpations: Abdomen is soft. Musculoskeletal: Normal range of motion. Skin:     General: Skin is warm and dry. Neurological:      Mental Status: He is alert and oriented to person, place, and time. Deep Tendon Reflexes: Reflexes are normal and symmetric. Psychiatric:         Behavior: Behavior normal.         Thought Content:  Thought content normal.         Judgment: Judgment normal.         LABS:  CBC:   Lab Results   Component Value Date    WBC 6.6 12/07/2020    RBC 2.84 12/07/2020    RBC 4.38 04/26/2012    HGB 7.3 12/07/2020    HCT 22.9 12/07/2020    MCV 80.8 12/07/2020    MCH 25.9 12/07/2020    MCHC 32.0 12/07/2020    RDW 30.6 12/07/2020     12/07/2020    MPV 8.3 04/26/2012     CBC with Differential:   Lab Results   Component Value Date    WBC 6.6 12/07/2020    RBC 2.84 12/07/2020    RBC 4.38 04/26/2012    HGB 7.3 12/07/2020    HCT 22.9 12/07/2020     12/07/2020    MCV 80.8 12/07/2020    MCH 25.9 12/07/2020    MCHC 32.0 12/07/2020    RDW 30.6 12/07/2020    LYMPHOPCT 1.6 12/01/2020    MONOPCT 0.4 12/01/2020    BASOPCT 0.3 12/01/2020    MONOSABS 0.0 12/01/2020    LYMPHSABS 0.1 12/01/2020    EOSABS 0.0 12/01/2020    BASOSABS 0.0 12/01/2020     CMP:    Lab Results   Component Value Date     12/07/2020    K 4.4 12/07/2020    K 4.6 08/30/2019    CL 98 12/07/2020    CO2 18 12/07/2020    BUN 39 12/07/2020    CREATININE 1.32 12/07/2020    GFRAA >60.0 12/07/2020    LABGLOM 53.9 12/07/2020    GLUCOSE 116 12/07/2020    GLUCOSE 95 04/26/2012    PROT 5.7 12/07/2020    LABALBU 2.4 12/07/2020    LABALBU 4.5 04/26/2012    CALCIUM 7.7 12/07/2020    BILITOT 0.3 12/07/2020    ALKPHOS 48 12/07/2020    AST 47 12/07/2020    ALT 35 12/07/2020     BMP:    Lab Results   Component Value Date     12/07/2020    K 4.4 12/07/2020    K 4.6 08/30/2019    CL 98 12/07/2020    CO2 18 12/07/2020    BUN 39 12/07/2020    LABALBU 2.4 12/07/2020    LABALBU 4.5 04/26/2012    CREATININE 1.32 12/07/2020    CALCIUM 7.7 12/07/2020    GFRAA >60.0 12/07/2020    LABGLOM 53.9 12/07/2020    GLUCOSE 116 12/07/2020    GLUCOSE 95 04/26/2012     Magnesium:    Lab Results   Component Value Date    MG 1.6 12/04/2020     Troponin:    Lab Results   Component Value Date    TROPONINI 0.065 12/02/2020       Radiology:  Xr Chest Portable    Result Date: 12/6/2020  EXAMINATION: CHEST PORTABLE VIEW  CLINICAL HISTORY: Pneumonia, follow-up COMPARISONS: December 3, 2020  FINDINGS: 2 views of the chest is submitted. The cardiac silhouette is enlarged Pulmonary vascular unremarkable. Right sided trachea. There is increasing multifocal to coalescent airspace disease within the right upper right middle and right lower lobes as compared to the prior examination.      THERE IS INCREASING MULTIFOCAL TO COALESCENT AIRSPACE DISEASE WITHIN THE RIGHT UPPER

## 2020-12-07 NOTE — PROGRESS NOTES
WBC, UA  0-2  /HPF         RBC, UA  0-2  /HPF         Epithelial Cells, UA  0-2  /HPF         High sensitivity CRP [[id number]] (Abnormal)  Collected: 12/03/20 1625        Specimen: Blood  Updated: 12/03/20 1914        CRP High Sensitivity  231.0  mg/L         Blood cultures remain no growth      ASSESSMENT  PLAN:    Possible right lung pneumonia  Ceftriaxone doxycycline    Chest x-ray does show increasing infiltrate right lung

## 2020-12-07 NOTE — PROGRESS NOTES
INPATIENT PROGRESS NOTES    PATIENT NAME: Ngoc Leon  MRN: [id number]  SERVICE DATE:  December 7, 2020   SERVICE TIME:  9:16 AM      PRIMARY SERVICE: Pulmonary Disease    CHIEF COMPLAINTS: Shortness of breath    INTERVAL HPI: Patient seen and examined at bedside, Interval Notes, orders reviewed. Nursing notes noted     , he denies chest pain or shortness of breath, headache resolved, he seems more confused today saturation 98% on 2 L no coughing, blood pressure was low overnight but now is okay, his last temp was 39.3 degrees at 7:40 AM yesterday  Review of system:     GI Abdominal pain No  Skin Rash No    Social History     Tobacco Use    Smoking status: Former Smoker     Packs/day: 1.00     Years: 50.00     Pack years: 50.00     Types: Cigarettes     Last attempt to quit: 11/16/2017     Years since quitting: 3.0    Smokeless tobacco: Never Used   Substance Use Topics    Alcohol use: No         Problem Relation Age of Onset    High Blood Pressure Father     Heart Attack Father     Coronary Art Dis Father          OBJECTIVE    Body mass index is 28.58 kg/m². PHYSICAL EXAM:  Vitals:  /64   Pulse 104   Temp 98.2 °F (36.8 °C)   Resp 18   Ht 5' 10\" (1.778 m)   Wt 199 lb 3.2 oz (90.4 kg)   SpO2 98%   BMI 28.58 kg/m²     General: alert, cooperative, no distress  Head: normocephalic, atraumatic, no tenderness  Eyes:No gross abnormalities. ENT:  MMM no lesions  Neck:  supple and no masses  Chest : clear to auscultation bilaterally- no wheezes, rales or rhonchi, normal air movement, no respiratory distress  Heart[de-identified] Heart sounds are normal.  Regular rate and rhythm without murmur, gallop or rub. ABD:  symmetric, soft, non-tender  Musculoskeletal : no cyanosis, no clubbing and no edema  Neuro:  Grossly normal  Skin: No rashes or nodules noted.   Lymph node:  no cervical nodes  Urology: No Pillai   Psychiatric: appropriate    DATA:   Recent Labs     12/06/20  0538 12/07/20  0558   WBC 5.8 6.6   HGB 8. 1* 7.3*   HCT 25.6* 22.9*   MCV 81.5 80.8    174     Recent Labs     12/05/20  0625   *   K 4.8      CO2 17*   BUN 29*   CREATININE 0.88   GLUCOSE 85   CALCIUM 8.6   LABGLOM >60.0   GFRAA >60.0       MV Settings:          No results for input(s): PHART, EFK1EBQ, PO2ART, LTA7FVL, BEART, M4FFVVQD in the last 72 hours.     O2 Device: Nasal cannula  O2 Flow Rate (L/min): 2 L/min    DIET CARDIAC; No Caffeine     MEDICATIONS during current hospitalization:    Continuous Infusions:    Scheduled Meds:   SUMAtriptan  6 mg Subcutaneous Once    pill splitter   Does not apply Once    doxycycline (VIBRAMYCIN) IV  100 mg Intravenous Q12H    dilTIAZem  60 mg Oral 3 times per day    iron sucrose  200 mg Intravenous Q24H    warfarin (COUMADIN) daily dosing (placeholder)   Other RX Placeholder    metoprolol tartrate  100 mg Oral BID    cefTRIAXone (ROCEPHIN) IV  1 g Intravenous Q24H    baclofen  10 mg Oral TID    clopidogrel  75 mg Oral Daily    CoQ-10  50 mg Oral BID    pantoprazole  40 mg Oral Daily    ezetimibe  10 mg Oral Daily    ferrous sulfate  325 mg Oral Daily with breakfast    folic acid  1 mg Oral Daily    gabapentin  100 mg Oral TID    leflunomide  20 mg Oral Daily    lisinopril  10 mg Oral Daily    predniSONE  5 mg Oral Daily    pregabalin  75 mg Oral BID    Tofacitinib Citrate ER  11 mg Oral Daily    magnesium oxide  400 mg Oral BID    sodium chloride flush  10 mL Intravenous 2 times per day    sodium chloride flush  10 mL Intravenous 2 times per day    atorvastatin  80 mg Oral Nightly       PRN Meds:ketorolac, aspirin-acetaminophen-caffeine, sodium chloride flush, albuterol sulfate HFA, metoprolol, sodium chloride flush, acetaminophen **OR** acetaminophen, polyethylene glycol, promethazine **OR** ondansetron, nitroGLYCERIN    Radiology  Cta Chest W Wo Contrast    Result Date: 12/1/2020  EXAM: CT SCAN OF THE THORAX WITH CONTRAST/PE PROTOCOL/CTA CHEST COMPARISON: CHEST RADIOGRAPHS, DECEMBER 1, 2020, APRIL 2, 2019. REASON FOR EXAMINATION:  SHORTNESS OF BREATH FOR SEVERAL WEEKS TECHNIQUE: Helical CTA was performed through the chest utilizing 100 mL of Isovue 300 intravenous contrast medium. .  Images were obtained with bolus tracking in order to opacify the pulmonary arteries. Thick section coronal MIP 3D reconstructions were performed on a separate workstation. FINDINGS:  No intraluminal filling defects, pulmonary arterial tree. Thoracic aorta normal in course and caliber. Cardiac size normal. No pericardial effusion. Coronary calcification identified. No hilar, mediastinal, or axillary lymph node enlargement. Limited imaging upper abdomen shows small hiatal hernia. Adrenal glands without anomaly. Right lung shows emphysematous change, with fibrous scarring at right lung apex. Dependent subsegmental atelectatic change, right upper lobe, with mosaic morphology identified. Dependent subsegmental atelectatic change right lower lobe. Focal area of bullous change right lung base. No nodules, masses, pleural effusion. Left lung shows emphysematous change with fibrous scarring at left lung apex. Dependent subsegmental atelectatic change, left lung base. No osteoblastic, no osteolytic lesions. No CT evidence pulmonary embolism. Emphysema. Dependent subsegmental atelectatic changes discussed, right greater than left, with corresponding signs of air trapping, greatest in right lung. All CT scans at this facility use dose modulation, iterative reconstruction, and/or weight based dosing when appropriate to reduce radiation dose to as low as reasonably achievable. Xr Chest Portable    Result Date: 12/3/2020  XR CHEST PORTABLE : 12/3/2020 CLINICAL HISTORY:  Right lung pneumonia . COMPARISON: Portable chest and chest CTA 12/1/2020. TECHNIQUE: Two portable upright AP radiographs of the chest were obtained.  FINDINGS: Mild mid to lower lung field pulmonary infiltrates predominantly within the inferior aspect of the right upper lobe have not significantly changed from the prior studies. There is no significant pleural effusion, cardiomegaly, vascular congestion, pneumothorax, or displaced fractures identified. STABLE MILD PULMONARY INFILTRATES, PREDOMINANTLY OF THE INFERIOR ASPECT OF THE RIGHT UPPER LOBE. Xr Chest Portable    Result Date: 12/1/2020  EXAMINATION: XR CHEST PORTABLE. DATE AND TIME:12/1/2020 12:12 PM CLINICAL HISTORY: SHORTNESS OF BREATH   SOB  COMPARISONS: FEBRUARY 27, 2019  FINDINGS: Patchy area of increased opacity in the right midlung zone suspicious  for small infiltrate. A Chest x-ray is insensitive in mild or early COVID-19  infection. Furthermore the specificity of chest x-ray findings for COVID-19 pneumonia is not established. Remaining lung fields clear. Heart and mediastinum within normal limits. SUSPECT RIGHT UPPER LUNG ZONE INFILTRATE. Chest x-ray shows nonspecific groundglass changes      IMPRESSION AND SUGGESTION:  Patient is at risk due to   · Shortness of breath, improved, denies  · Abnormal CT with peripheral groundglass opacities, concerning for evolving ILD in a patient with underlying immunosuppressive treatment and RA, however other etiologies can include edema, dependent atelectasis, or drug reaction.    · Mild obstructive airway disease, with mild emphysema currently no signs of exacerbation  · Paroxysmal A. fib with RVR  · Rheumatoid arthritis  · History of DVT and PE, on Coumadin  · Headache, consistent with migraine headache      Recommendations  · Home O2 eval prior to discharge  · Target O2 88 to 90%  · Patient will need high-resolution CAT scan as outpatient to evaluate interstitial changes  · Repeat procalcitonin, CRP,and LFT   · Antibiotics per ID                   Electronically signed by Horace Berry MD,  FCCP   on 12/7/2020 at 9:16 AM

## 2020-12-08 NOTE — PROGRESS NOTES
Physical Therapy Missed Treatment   Facility/Department: [facility] MED SURG D320/T773-83    NAME: Leticia Dunaway    : 1950 (78 y.o.)  MRN: [id number]    Account: [de-identified]  Gender: male    Nursing request to hold OOB activity this AM due to O2 desaturation/ A-fib. Attempted to see patient bedside. He requested to check back later due to phone call. Attempted x2.                      Electronically signed by Ping Diaz PTA on 20 at 11:43 AM EST

## 2020-12-08 NOTE — PROGRESS NOTES
Progress Note  Patient: Miguelina Timmons  Unit/Bed: E760/N712-67  YOB: 1950  MRN: [id number]  Acct: [de-identified]   Admitting Diagnosis: Anginal equivalent [facility]) [I20.8]  NSTEMI (non-ST elevated myocardial infarction) [facility]) [I21.4]  Date:  12/1/2020  Hospital Day: 6    Chief Complaint:  Chest pain, fevers    Subjective  Admitted with chest pain. Cardiac catheterization showed patent stent. Spiked temperature. Etiology of fever is still undetermined. ID and pulmonary on the case. Because of requiring more oxygen, being transferred to ICU for closer monitoring    Review of Systems:   Review of Systems   Constitutional: Negative for activity change, appetite change, diaphoresis, fatigue and unexpected weight change. HENT: Negative for facial swelling, nosebleeds, trouble swallowing and voice change. Respiratory: Negative for apnea, chest tightness, shortness of breath and wheezing. Cardiovascular: Negative for chest pain, palpitations and leg swelling. Gastrointestinal: Negative for abdominal distention, anal bleeding, blood in stool, nausea and vomiting. Genitourinary: Negative for decreased urine volume and dysuria. Musculoskeletal: Negative for gait problem and myalgias. Skin: Negative for color change, pallor, rash and wound. Neurological: Negative for dizziness, syncope, facial asymmetry, weakness, light-headedness, numbness and headaches. Hematological: Does not bruise/bleed easily. Psychiatric/Behavioral: Negative for agitation, behavioral problems, confusion, hallucinations and suicidal ideas. The patient is not nervous/anxious. All other systems reviewed and are negative. Physical Examination:    BP (!) 99/59   Pulse 80   Temp 97.8 °F (36.6 °C) (Oral)   Resp 20   Ht 5' 10\" (1.778 m)   Wt 199 lb 3.2 oz (90.4 kg)   SpO2 100%   BMI 28.58 kg/m²    Physical Exam  Constitutional:       Appearance: He is well-developed. He is ill-appearing.    HENT:      Head: Atraumatic. Eyes:      Conjunctiva/sclera: Conjunctivae normal.      Pupils: Pupils are equal, round, and reactive to light. Neck:      Thyroid: No thyromegaly. Vascular: No JVD. Trachea: No tracheal deviation. Cardiovascular:      Rate and Rhythm: Normal rate. Rhythm irregular. Heart sounds: No murmur. No friction rub. No gallop. Pulmonary:      Effort: No respiratory distress. Breath sounds: No wheezing or rales. Chest:      Chest wall: No tenderness. Abdominal:      General: There is no distension. Palpations: Abdomen is soft. There is no mass. Tenderness: There is no abdominal tenderness. There is no guarding or rebound. Musculoskeletal: Normal range of motion. Lymphadenopathy:      Cervical: No cervical adenopathy. Skin:     General: Skin is warm. Neurological:      Mental Status: He is alert and oriented to person, place, and time.          LABS:  CBC:   Lab Results   Component Value Date    WBC 7.2 12/08/2020    RBC 2.77 12/08/2020    RBC 4.38 04/26/2012    HGB 7.2 12/08/2020    HCT 22.9 12/08/2020    MCV 82.8 12/08/2020    MCH 26.1 12/08/2020    MCHC 31.6 12/08/2020    RDW 31.5 12/08/2020     12/08/2020    MPV 8.3 04/26/2012     CBC with Differential:   Lab Results   Component Value Date    WBC 7.2 12/08/2020    RBC 2.77 12/08/2020    RBC 4.38 04/26/2012    HGB 7.2 12/08/2020    HCT 22.9 12/08/2020     12/08/2020    MCV 82.8 12/08/2020    MCH 26.1 12/08/2020    MCHC 31.6 12/08/2020    RDW 31.5 12/08/2020    LYMPHOPCT 6.8 12/08/2020    MONOPCT 1.5 12/08/2020    BASOPCT 0.5 12/08/2020    MONOSABS 0.1 12/08/2020    LYMPHSABS 0.6 12/08/2020    EOSABS 0.1 12/08/2020    BASOSABS 0.0 12/08/2020     CMP:    Lab Results   Component Value Date     12/08/2020    K 4.9 12/08/2020    K 4.6 08/30/2019     12/08/2020    CO2 15 12/08/2020    BUN 46 12/08/2020    CREATININE 1.45 12/08/2020    GFRAA 58.5 12/08/2020    LABGLOM 48.4 12/08/2020 GLUCOSE 105 12/08/2020    GLUCOSE 95 04/26/2012    PROT 5.7 12/07/2020    LABALBU 2.3 12/08/2020    LABALBU 4.5 04/26/2012    CALCIUM 8.8 12/08/2020    BILITOT 0.3 12/07/2020    ALKPHOS 48 12/07/2020    AST 47 12/07/2020    ALT 35 12/07/2020     BMP:    Lab Results   Component Value Date     12/08/2020    K 4.9 12/08/2020    K 4.6 08/30/2019     12/08/2020    CO2 15 12/08/2020    BUN 46 12/08/2020    LABALBU 2.3 12/08/2020    LABALBU 4.5 04/26/2012    CREATININE 1.45 12/08/2020    CALCIUM 8.8 12/08/2020    GFRAA 58.5 12/08/2020    LABGLOM 48.4 12/08/2020    GLUCOSE 105 12/08/2020    GLUCOSE 95 04/26/2012     Magnesium:    Lab Results   Component Value Date    MG 1.6 12/04/2020     Troponin:    Lab Results   Component Value Date    TROPONINI 0.065 12/02/2020       Radiology:  Ct Abdomen Pelvis Wo Contrast Additional Contrast? None    Result Date: 12/8/2020  EXAMINATION: CT ABDOMEN PELVIS WO CONTRAST, CT CHEST WO CONTRAST, 12/8/2020 9:28 AM CLINICAL HISTORY:  sepsis COMPARISON: None TECHNIQUE: Helical CT was performed through the chest without IV contrast., All CT scans at this facility use dose modulation, iterative reconstruction, and/or weight based dosing when appropriate to reduce radiation dose to as low as reasonably achievable. FINDINGS CHEST: The thyroid gland is within normal limits. The axillary regions demonstrate no significant lymphadenopathy or solid or cystic lesions. There is shotty mediastinal lymphadenopathy. The heart and pericardium are within normal limits. There is no pericardial effusion. The great vessels of the chest are normal in course and caliber. The lungs are diffusely involved with patchy groundglass opacities scattered throughout all segments of both lobes.  In addition there are diffuse increased reticular markings throughout both lungs most notably at the periphery and in the apices greater on the right worrisome for chronic interstitial disease and early pulmonary fibrosis. No dominant nodules or masses. There are no pleural effusions. There is no hilar lymphadenopathy. There are no acute bony abnormalities. THERE IS SHOTTY MEDIASTINAL LYMPHADENOPATHY IN BOTH SIDES ARE DIFFUSELY INVOLVED WITH PATCHY ALVEOLAR OPACITIES THROUGHOUT ALL SEGMENTS. IN ADDITION THERE ARE INCREASED RETICULAR MARKINGS MOST NOTABLY IN THE RIGHT APEX AND AT THE PERIPHERY OF  BOTH LUNGS INDICATING CHRONIC INTERSTITIAL DISEASE AND POSSIBLE EARLY PULMONARY FIBROSIS. SUPERINFECTION DUE TO COVID-19 VERSUS OTHER ETIOLOGIES MAY BE CONSIDERED. EXAMINATION: CT ABDOMEN PELVIS WO CONTRAST, CT CHEST WO CONTRAST TECHNIQUE: Contiguous axial CT sections of the abdomen and pelvis. Imaging was obtained after IV contrast administration. .  All CT scans at this facility use dose modulation, iterative reconstruction, and/or weight based dosing when appropriate to reduce radiation dose to as low as reasonably achievable. FINDINGS ABDOMEN AND PELVIS: Liver: The liver is normal in size and attenuation without intra or extrahepatic bile duct dilatation. There are no focal lesions. There is no intra or extrahepatic bile duct dilatation. Gallbladder: No calcified gallstones. Normal gallbladder wall. No pericholecystic fluid. Spleen: There are no focal lesions or calcifications in the spleen. There is no splenomegaly  Pancreas: The pancreas is normal in size and attenuation without focal lesions or dilatation of the pancreatic duct. Kidneys: There are no solid or cystic lesions. There is no hydronephrosis. Adrenal glands are negative. Bowel: There is no evidence of bowel obstruction. There are diverticula of the descending and sigmoid colon without evidence of diverticulitis. Appendix: There  is no CT evidence for appendicitis. Nodes: No lymphadenopathy. Aorta: No aneurysm  Peritoneum: No free fluid or free air. The abdominal wall is intact. Pelvis: There are no solid or cystic lesions.  The urinary bladder is within normal limits. Bones: There are no acute osseous changes. IMPRESSION: NO ACUTE PATHOLOGY IN THE ABDOMEN AND PELVIS. Ct Chest Wo Contrast    Result Date: 12/8/2020  EXAMINATION: CT ABDOMEN PELVIS WO CONTRAST, CT CHEST WO CONTRAST, 12/8/2020 9:28 AM CLINICAL HISTORY:  sepsis COMPARISON: None TECHNIQUE: Helical CT was performed through the chest without IV contrast., All CT scans at this facility use dose modulation, iterative reconstruction, and/or weight based dosing when appropriate to reduce radiation dose to as low as reasonably achievable. FINDINGS CHEST: The thyroid gland is within normal limits. The axillary regions demonstrate no significant lymphadenopathy or solid or cystic lesions. There is shotty mediastinal lymphadenopathy. The heart and pericardium are within normal limits. There is no pericardial effusion. The great vessels of the chest are normal in course and caliber. The lungs are diffusely involved with patchy groundglass opacities scattered throughout all segments of both lobes. In addition there are diffuse increased reticular markings throughout both lungs most notably at the periphery and in the apices greater on the right worrisome for chronic interstitial disease and early pulmonary fibrosis. No dominant nodules or masses. There are no pleural effusions. There is no hilar lymphadenopathy. There are no acute bony abnormalities. THERE IS SHOTTY MEDIASTINAL LYMPHADENOPATHY IN BOTH SIDES ARE DIFFUSELY INVOLVED WITH PATCHY ALVEOLAR OPACITIES THROUGHOUT ALL SEGMENTS. IN ADDITION THERE ARE INCREASED RETICULAR MARKINGS MOST NOTABLY IN THE RIGHT APEX AND AT THE PERIPHERY OF  BOTH LUNGS INDICATING CHRONIC INTERSTITIAL DISEASE AND POSSIBLE EARLY PULMONARY FIBROSIS. SUPERINFECTION DUE TO COVID-19 VERSUS OTHER ETIOLOGIES MAY BE CONSIDERED. EXAMINATION: CT ABDOMEN PELVIS WO CONTRAST, CT CHEST WO CONTRAST TECHNIQUE: Contiguous axial CT sections of the abdomen and pelvis.   Imaging was obtained after IV contrast administration. .  All CT scans at this facility use dose modulation, iterative reconstruction, and/or weight based dosing when appropriate to reduce radiation dose to as low as reasonably achievable. FINDINGS ABDOMEN AND PELVIS: Liver: The liver is normal in size and attenuation without intra or extrahepatic bile duct dilatation. There are no focal lesions. There is no intra or extrahepatic bile duct dilatation. Gallbladder: No calcified gallstones. Normal gallbladder wall. No pericholecystic fluid. Spleen: There are no focal lesions or calcifications in the spleen. There is no splenomegaly  Pancreas: The pancreas is normal in size and attenuation without focal lesions or dilatation of the pancreatic duct. Kidneys: There are no solid or cystic lesions. There is no hydronephrosis. Adrenal glands are negative. Bowel: There is no evidence of bowel obstruction. There are diverticula of the descending and sigmoid colon without evidence of diverticulitis. Appendix: There  is no CT evidence for appendicitis. Nodes: No lymphadenopathy. Aorta: No aneurysm  Peritoneum: No free fluid or free air. The abdominal wall is intact. Pelvis: There are no solid or cystic lesions. The urinary bladder is within normal limits. Bones: There are no acute osseous changes. IMPRESSION: NO ACUTE PATHOLOGY IN THE ABDOMEN AND PELVIS. EKG:  Assessment:    Active Hospital Problems    Diagnosis Date Noted    NSTEMI (non-ST elevated myocardial infarction) ([address] [I21.4] 12/02/2020    Anginal equivalent ([address] [I20.8] 12/01/2020    Acute chest pain [R07.9] 12/01/2020           Plan:  1. Discussed with Dr. Milo Carmona. Agree with transferring the patient to ICU for closer monitoring of oxygenation and heart rate. I talked with ID. Still not sure why he is spiking fevers.   Electronically signed by Megan Mas MD on 12/8/2020 at 3:39 PM

## 2020-12-08 NOTE — FLOWSHEET NOTE
PCA notified me that patient's temp 102.6, oxygen saturation 74% on 3L. 50% venti mask placed on patient and oxygen saturation increased to 92%. Patient atrial fibrillation on the monitor, rate 126. IV lopressor given at [phone] 68, not due yet. Oral lopressor will be given. Dr. Marina Kehr in to see patient. Electronically signed by Zara Velasquez Boy on 12/8/2020 at 8:10 AM    1030- Spoke with Dr. Moshe Trinh and notified him of the temperature 102. 6. Electronically signed by Zara Woody on 12/8/2020 at 10:35 AM    1253- Patient's oxygen saturation drops to mid to low 80's when placed on 6 L nasal cannula to eat. High flow oxygen ordered per Dr. Marina Kehr. Respiratory Therapist John Thornton notified. Electronically signed by Laurence Eastern Youlanda Castleman RN on 12/8/2020 at 12:54 PM    12- Dr. Marina Kehr and Dr. Hanane Colunga aware of the low blood pressure. 85/57 right arm and 99/59 left arm. 1740- Patient bladder scanned 159 cc. Electronically signed by Zara Woody on 12/8/2020 at 5:45 PM

## 2020-12-08 NOTE — PROGRESS NOTES
Physical Therapy Missed Treatment   Facility/Department: [facility] MED SURG A553/O111-25    NAME: Kaden Caban  Patient Status:   : 1950 (78 y.o.)  MRN: [id number]  Account: [de-identified]  Gender: male        [] Patient Declines PT Treatment            [x] Patient Unavailable:     Initiated therex with pt but then respiratory and lab came. Pt very sob. Decided to hold tx for today. Will attempt PT Treatment again at earliest convenience.         Electronically signed by Rody Johnson PTA on 20 at 1:23 PM EST

## 2020-12-08 NOTE — PROGRESS NOTES
Infectious Disease     Patient Name: Jarod Ruvalcaba  Date: 12/8/2020  YOB: 1950  Medical Record Number: [id number]        Right lung pneumonia    Repeat COVID-19 testing negative  Fevers remain persistently elevated more than 102  All cultures are negative    CT scan shows shotty mediastinal lymphadenopathy diffuse patchy alveolar infiltrates markings consistent with chronic interstitial disease possible pulmonary fibrosis also consistent with COVID-19 infection    Transferred to intensive care unit for worsening oxygenation        Review of Systems   Constitutional: Positive for fever. Respiratory: Positive for cough and shortness of breath. Cardiovascular: Negative. Gastrointestinal: Negative. Physical Exam   Constitutional: He is oriented to person, place, and time. Cardiovascular: Normal heart sounds. No murmur heard. Pulmonary/Chest: Breath sounds normal. No respiratory distress. He has no wheezes. He has no rales. Abdominal: Soft. Bowel sounds are normal. He exhibits no distension and no mass. There is no abdominal tenderness. There is no rebound. Neurological: He is alert and oriented to person, place, and time. Blood pressure (!) 99/59, pulse 80, temperature 97.8 °F (36.6 °C), temperature source Oral, resp. rate 20, height 5' 10\" (1.778 m), weight 199 lb 3.2 oz (90.4 kg), SpO2 100 %.       .   Lab Results   Component Value Date    WBC 7.2 12/08/2020    HGB 7.2 (L) 12/08/2020    HCT 22.9 (L) 12/08/2020    MCV 82.8 12/08/2020     12/08/2020     Lab Results   Component Value Date     12/08/2020    K 4.9 12/08/2020    K 4.6 08/30/2019     12/08/2020    CO2 15 12/08/2020    BUN 46 12/08/2020    CREATININE 1.45 12/08/2020    GLUCOSE 105 12/08/2020    GLUCOSE 95 04/26/2012    CALCIUM 8.8 12/08/2020      Microscopic Urinalysis [[id number]]  Collected: 12/03/20 1702         Updated: 12/03/20 2008        Bacteria, UA  Negative  /HPF         Hyaline Casts, UA  1-3  /HPF         WBC, UA  0-2  /HPF         RBC, UA  0-2  /HPF         Epithelial Cells, UA  0-2  /HPF         High sensitivity CRP [[id number]] (Abnormal)  Collected: 12/03/20 1625        Specimen: Blood  Updated: 12/03/20 1914        CRP High Sensitivity  231.0  mg/L         Blood cultures remain no growth      ASSESSMENT  PLAN:      Continue to treat for possible atypical pneumonia however lack of response to current doxycycline ceftriaxone therapy argues against infection etiology    Steroids have been increased    COVID-19 is remain negative   Placed in isolation because of findings on CT scan    Patient is not showing signs of worsening rheumatoid arthritis such as joint inflammation    Being evaluated for possible drug-induced interstitial pneumonia

## 2020-12-08 NOTE — PROGRESS NOTES
Hospitalist Progress Note      Date of Admission: 12/1/2020  Chief Complaint:    Chief Complaint   Patient presents with    Shortness of Breath     for last  two weeks. Subjective:  No new complaints.   No nausea, vomiting, chest pain, or headache      Medications:    Infusion Medications   Scheduled Medications    methylPREDNISolone  40 mg Intravenous Q8H    SUMAtriptan  6 mg Subcutaneous Once    pill splitter   Does not apply Once    doxycycline (VIBRAMYCIN) IV  100 mg Intravenous Q12H    dilTIAZem  60 mg Oral 3 times per day    warfarin (COUMADIN) daily dosing (placeholder)   Other RX Placeholder    metoprolol tartrate  100 mg Oral BID    cefTRIAXone (ROCEPHIN) IV  1 g Intravenous Q24H    baclofen  10 mg Oral TID    clopidogrel  75 mg Oral Daily    CoQ-10  50 mg Oral BID    pantoprazole  40 mg Oral Daily    ezetimibe  10 mg Oral Daily    ferrous sulfate  325 mg Oral Daily with breakfast    folic acid  1 mg Oral Daily    gabapentin  100 mg Oral TID    [Held by provider] leflunomide  20 mg Oral Daily    lisinopril  10 mg Oral Daily    [Held by provider] predniSONE  5 mg Oral Daily    pregabalin  75 mg Oral BID    [Held by provider] Tofacitinib Citrate ER  11 mg Oral Daily    magnesium oxide  400 mg Oral BID    sodium chloride flush  10 mL Intravenous 2 times per day    sodium chloride flush  10 mL Intravenous 2 times per day    atorvastatin  80 mg Oral Nightly     PRN Meds: ipratropium-albuterol, ketorolac, aspirin-acetaminophen-caffeine, sodium chloride flush, albuterol sulfate HFA, metoprolol, sodium chloride flush, acetaminophen **OR** acetaminophen, polyethylene glycol, promethazine **OR** ondansetron, nitroGLYCERIN    Intake/Output Summary (Last 24 hours) at 12/8/2020 1634  Last data filed at 12/8/2020 1434  Gross per 24 hour   Intake 2090 ml   Output --   Net 2090 ml     Exam:  BP (!) 99/59   Pulse 80   Temp 97.8 °F (36.6 °C) (Oral)   Resp 20   Ht 5' 10\" (1.778 m)   Wt 199 lb 3.2 oz (90.4 kg)   SpO2 100%   BMI 28.58 kg/m²   Head: Normocephalic, atraumatic  Sclera clear  Neck JVD flat  Lungs: normal effort of breathing    Labs:   Recent Labs     12/07/20  0558 12/08/20  0540 12/08/20  1257   WBC 6.6 8.1 7.2   HGB 7.3* 8.3* 7.2*   HCT 22.9* 26.1* 22.9*    198 188     Recent Labs     12/07/20  0558 12/08/20  0540   * 133*   K 4.4 4.9   CL 98 100   CO2 18* 15*   BUN 39* 46*   CREATININE 1.32* 1.45*   CALCIUM 7.7* 8.8   PHOS 1.8* 3.0   AST 47*  --    ALT 35  --    BILIDIR <0.2  --    BILITOT 0.3  --    ALKPHOS 48  --      Recent Labs     12/06/20  0538 12/07/20  0558 12/08/20  0540   INR 2.5 2.9 2.8     No results for input(s): Valente Wilmot in the last 72 hours. Radiology:  CT CHEST WO CONTRAST   Final Result   THERE IS SHOTTY MEDIASTINAL LYMPHADENOPATHY IN BOTH SIDES ARE DIFFUSELY INVOLVED WITH PATCHY ALVEOLAR OPACITIES THROUGHOUT ALL SEGMENTS. IN ADDITION THERE ARE INCREASED RETICULAR MARKINGS MOST NOTABLY IN THE RIGHT APEX AND AT THE PERIPHERY OF    BOTH LUNGS INDICATING CHRONIC INTERSTITIAL DISEASE AND POSSIBLE EARLY PULMONARY FIBROSIS. SUPERINFECTION DUE TO COVID-19 VERSUS OTHER ETIOLOGIES MAY BE CONSIDERED. EXAMINATION: CT ABDOMEN PELVIS WO CONTRAST, CT CHEST WO CONTRAST       TECHNIQUE: Contiguous axial CT sections of the abdomen and pelvis. Imaging was obtained after IV contrast administration. .  All CT scans at this facility use dose modulation, iterative reconstruction, and/or weight based dosing when appropriate to    reduce radiation dose to as low as reasonably achievable. FINDINGS ABDOMEN AND PELVIS:      Liver: The liver is normal in size and attenuation without intra or extrahepatic bile duct dilatation. There are no focal lesions. There is no intra or extrahepatic bile duct dilatation. Gallbladder: No calcified gallstones. Normal gallbladder wall. No pericholecystic fluid. Spleen:  There are no focal lesions or calcifications in the spleen. There is no splenomegaly        Pancreas: The pancreas is normal in size and attenuation without focal lesions or dilatation of the pancreatic duct. Kidneys: There are no solid or cystic lesions. There is no hydronephrosis. Adrenal glands are negative. Bowel: There is no evidence of bowel obstruction. There are diverticula of the descending and sigmoid colon without evidence of diverticulitis. Appendix: There  is no CT evidence for appendicitis. Nodes: No lymphadenopathy. Aorta: No aneurysm        Peritoneum: No free fluid or free air. The abdominal wall is intact. Pelvis: There are no solid or cystic lesions. The urinary bladder is within normal limits. Bones: There are no acute osseous changes. IMPRESSION: NO ACUTE PATHOLOGY IN THE ABDOMEN AND PELVIS. CT ABDOMEN PELVIS WO CONTRAST Additional Contrast? None   Final Result   THERE IS SHOTTY MEDIASTINAL LYMPHADENOPATHY IN BOTH SIDES ARE DIFFUSELY INVOLVED WITH PATCHY ALVEOLAR OPACITIES THROUGHOUT ALL SEGMENTS. IN ADDITION THERE ARE INCREASED RETICULAR MARKINGS MOST NOTABLY IN THE RIGHT APEX AND AT THE PERIPHERY OF    BOTH LUNGS INDICATING CHRONIC INTERSTITIAL DISEASE AND POSSIBLE EARLY PULMONARY FIBROSIS. SUPERINFECTION DUE TO COVID-19 VERSUS OTHER ETIOLOGIES MAY BE CONSIDERED. EXAMINATION: CT ABDOMEN PELVIS WO CONTRAST, CT CHEST WO CONTRAST       TECHNIQUE: Contiguous axial CT sections of the abdomen and pelvis. Imaging was obtained after IV contrast administration. .  All CT scans at this facility use dose modulation, iterative reconstruction, and/or weight based dosing when appropriate to    reduce radiation dose to as low as reasonably achievable. FINDINGS ABDOMEN AND PELVIS:      Liver: The liver is normal in size and attenuation without intra or extrahepatic bile duct dilatation. There are no focal lesions.   There is no intra or extrahepatic bile duct dilatation. Gallbladder: No calcified gallstones. Normal gallbladder wall. No pericholecystic fluid. Spleen: There are no focal lesions or calcifications in the spleen. There is no splenomegaly        Pancreas: The pancreas is normal in size and attenuation without focal lesions or dilatation of the pancreatic duct. Kidneys: There are no solid or cystic lesions. There is no hydronephrosis. Adrenal glands are negative. Bowel: There is no evidence of bowel obstruction. There are diverticula of the descending and sigmoid colon without evidence of diverticulitis. Appendix: There  is no CT evidence for appendicitis. Nodes: No lymphadenopathy. Aorta: No aneurysm        Peritoneum: No free fluid or free air. The abdominal wall is intact. Pelvis: There are no solid or cystic lesions. The urinary bladder is within normal limits. Bones: There are no acute osseous changes. IMPRESSION: NO ACUTE PATHOLOGY IN THE ABDOMEN AND PELVIS. XR CHEST PORTABLE   Final Result   THERE IS INCREASING MULTIFOCAL TO COALESCENT AIRSPACE DISEASE WITHIN THE RIGHT UPPER RIGHT MIDDLE AND RIGHT LOWER LOBES AS COMPARED TO THE PRIOR EXAMINATION. COULD CONSIDER CT SCAN THE CHEST WITHOUT IV CONTRAST TO FURTHER EVALUATE IF CLINICALLY INDICATED      XR CHEST PORTABLE   Final Result      STABLE MILD PULMONARY INFILTRATES, PREDOMINANTLY OF THE INFERIOR ASPECT OF THE RIGHT UPPER LOBE. CTA CHEST W WO CONTRAST   Final Result      No CT evidence pulmonary embolism. Emphysema. Dependent subsegmental atelectatic changes discussed, right greater than left, with corresponding signs of air trapping, greatest in right lung. All CT scans at this facility use dose modulation, iterative reconstruction, and/or weight based dosing when appropriate to reduce radiation dose to as low as reasonably achievable.          XR CHEST PORTABLE   Final Result   SUSPECT RIGHT UPPER LUNG ZONE INFILTRATE. Assessment/Plan:    Shortness of breath might be secondary to interstitial pneumonia either from autoimmune disease or drug-induced secondary to leflunomide and methotrexate. Patient currently not receiving either medication. Steroids increased by pulm. No significant left shift at the time of admission. Fio2 requirements increasing. pulm updated. Cardiology (attending) on floor, considering transfer. Creat uptrending - hard to  fluid status, trial of diuretic x1 by pulm.  In the interim, will hold lisinopril and monitor renal function    35 minutes total care time, >1/2 in unit/floor time and care coordination      Shelley Cain MD

## 2020-12-08 NOTE — PROGRESS NOTES
INPATIENT PROGRESS NOTES    PATIENT NAME: Melonie Bellamy  MRN: [id number]  SERVICE DATE:  December 8, 2020   SERVICE TIME:  8:42 AM      PRIMARY SERVICE: Pulmonary Disease    CHIEF COMPLAINTS: Fever and acute hypoxia    INTERVAL HPI: Patient seen and examined at bedside, Interval Notes, orders reviewed. Nursing notes noted    Continues to have fever, denies chest pain, no coughing, he is requiring more oxygen today now up on 50%, heart rate appropriately increased with fever, has stool incontinence. Review of system:     GI Abdominal pain No  Skin Rash No    Social History     Tobacco Use    Smoking status: Former Smoker     Packs/day: 1.00     Years: 50.00     Pack years: 50.00     Types: Cigarettes     Last attempt to quit: 11/16/2017     Years since quitting: 3.0    Smokeless tobacco: Never Used   Substance Use Topics    Alcohol use: No         Problem Relation Age of Onset    High Blood Pressure Father     Heart Attack Father     Coronary Art Dis Father          OBJECTIVE    Body mass index is 28.58 kg/m². PHYSICAL EXAM:  Vitals:  /63   Pulse 114   Temp 102.6 °F (39.2 °C) (Oral)   Resp 20   Ht 5' 10\" (1.778 m)   Wt 199 lb 3.2 oz (90.4 kg)   SpO2 92%   BMI 28.58 kg/m²     General: alert, cooperative, no distress  Head: normocephalic, atraumatic, no tenderness  Eyes:No gross abnormalities. ENT:  MMM no lesions  Neck:  supple and no masses  Chest : Good air movement, minimal bilateral rales, no wheezing, nontender, tympanic  Heart[de-identified] Heart sounds are normal.  Regular rate and rhythm without murmur, gallop or rub. ABD:  symmetric, soft, non-tender  Musculoskeletal : no cyanosis, no clubbing and no edema  Neuro:  Grossly normal  Skin: No rashes or nodules noted.   Lymph node:  no cervical nodes  Urology: No Pillai   Psychiatric: appropriate    DATA:   Recent Labs     12/07/20  0558 12/08/20  0540   WBC 6.6 8.1   HGB 7.3* 8.3*   HCT 22.9* 26.1*   MCV 80.8 83.4    198     Recent Labs 12/07/20  0558 12/08/20  0540   * 133*   K 4.4 4.9   CL 98 100   CO2 18* 15*   BUN 39* 46*   CREATININE 1.32* 1.45*   GLUCOSE 116* 105*   CALCIUM 7.7* 8.8   PROT 5.7*  --    LABALBU 2.4* 2.3*   BILITOT 0.3  --    ALKPHOS 48  --    AST 47*  --    ALT 35  --    LABGLOM 53.9* 48.4*   GFRAA >60.0 58.5*       MV Settings:     FiO2 : 50 %    No results for input(s): PHART, TNA4UGB, PO2ART, JBO7AZQ, BEART, T5FIOMCU in the last 72 hours. O2 Device:  Air entrainment mask  O2 Flow Rate (L/min): 15 L/min    DIET CARDIAC; No Caffeine     MEDICATIONS during current hospitalization:    Continuous Infusions:    Scheduled Meds:   SUMAtriptan  6 mg Subcutaneous Once    pill splitter   Does not apply Once    doxycycline (VIBRAMYCIN) IV  100 mg Intravenous Q12H    dilTIAZem  60 mg Oral 3 times per day    warfarin (COUMADIN) daily dosing (placeholder)   Other RX Placeholder    metoprolol tartrate  100 mg Oral BID    cefTRIAXone (ROCEPHIN) IV  1 g Intravenous Q24H    baclofen  10 mg Oral TID    clopidogrel  75 mg Oral Daily    CoQ-10  50 mg Oral BID    pantoprazole  40 mg Oral Daily    ezetimibe  10 mg Oral Daily    ferrous sulfate  325 mg Oral Daily with breakfast    folic acid  1 mg Oral Daily    gabapentin  100 mg Oral TID    leflunomide  20 mg Oral Daily    lisinopril  10 mg Oral Daily    predniSONE  5 mg Oral Daily    pregabalin  75 mg Oral BID    Tofacitinib Citrate ER  11 mg Oral Daily    magnesium oxide  400 mg Oral BID    sodium chloride flush  10 mL Intravenous 2 times per day    sodium chloride flush  10 mL Intravenous 2 times per day    atorvastatin  80 mg Oral Nightly       PRN Meds:ipratropium-albuterol, ketorolac, aspirin-acetaminophen-caffeine, sodium chloride flush, albuterol sulfate HFA, metoprolol, sodium chloride flush, acetaminophen **OR** acetaminophen, polyethylene glycol, promethazine **OR** ondansetron, nitroGLYCERIN    Radiology  Cta Chest W Wo Contrast    Result Date: 12/1/2020  EXAM: CT SCAN OF THE THORAX WITH CONTRAST/PE PROTOCOL/CTA CHEST COMPARISON: CHEST RADIOGRAPHS, DECEMBER 1, 2020, APRIL 2, 2019. REASON FOR EXAMINATION:  SHORTNESS OF BREATH FOR SEVERAL WEEKS TECHNIQUE: Helical CTA was performed through the chest utilizing 100 mL of Isovue 300 intravenous contrast medium. .  Images were obtained with bolus tracking in order to opacify the pulmonary arteries. Thick section coronal MIP 3D reconstructions were performed on a separate workstation. FINDINGS:  No intraluminal filling defects, pulmonary arterial tree. Thoracic aorta normal in course and caliber. Cardiac size normal. No pericardial effusion. Coronary calcification identified. No hilar, mediastinal, or axillary lymph node enlargement. Limited imaging upper abdomen shows small hiatal hernia. Adrenal glands without anomaly. Right lung shows emphysematous change, with fibrous scarring at right lung apex. Dependent subsegmental atelectatic change, right upper lobe, with mosaic morphology identified. Dependent subsegmental atelectatic change right lower lobe. Focal area of bullous change right lung base. No nodules, masses, pleural effusion. Left lung shows emphysematous change with fibrous scarring at left lung apex. Dependent subsegmental atelectatic change, left lung base. No osteoblastic, no osteolytic lesions. No CT evidence pulmonary embolism. Emphysema. Dependent subsegmental atelectatic changes discussed, right greater than left, with corresponding signs of air trapping, greatest in right lung. All CT scans at this facility use dose modulation, iterative reconstruction, and/or weight based dosing when appropriate to reduce radiation dose to as low as reasonably achievable. Xr Chest Portable    Result Date: 12/3/2020  XR CHEST PORTABLE : 12/3/2020 CLINICAL HISTORY:  Right lung pneumonia . COMPARISON: Portable chest and chest CTA 12/1/2020.  TECHNIQUE: Two portable upright AP radiographs of the chest were obtained. FINDINGS: Mild mid to lower lung field pulmonary infiltrates predominantly within the inferior aspect of the right upper lobe have not significantly changed from the prior studies. There is no significant pleural effusion, cardiomegaly, vascular congestion, pneumothorax, or displaced fractures identified. STABLE MILD PULMONARY INFILTRATES, PREDOMINANTLY OF THE INFERIOR ASPECT OF THE RIGHT UPPER LOBE. Xr Chest Portable    Result Date: 12/1/2020  EXAMINATION: XR CHEST PORTABLE. DATE AND TIME:12/1/2020 12:12 PM CLINICAL HISTORY: SHORTNESS OF BREATH   SOB  COMPARISONS: FEBRUARY 27, 2019  FINDINGS: Patchy area of increased opacity in the right midlung zone suspicious  for small infiltrate. A Chest x-ray is insensitive in mild or early COVID-19  infection. Furthermore the specificity of chest x-ray findings for COVID-19 pneumonia is not established. Remaining lung fields clear. Heart and mediastinum within normal limits. SUSPECT RIGHT UPPER LUNG ZONE INFILTRATE.        Chest x-ray shows nonspecific groundglass changes      IMPRESSION AND SUGGESTION:  Patient is at risk due to   · Acute hypoxia, with worsening infiltrate, positive for 4 L fluid could be combination of pulmonary infection/interstitial disease plus volume overload  · Abnormal CT of the chest, infectious versus inflammatory process  · Mild obstructive airway disease, with mild emphysema currently no signs of exacerbation  · Paroxysmal A. fib with RVR  · Rheumatoid arthritis  · History of DVT and PE, on Coumadin  · Headache, consistent with migraine headache      Recommendations  · Lasix 20 mg IV x1  · CT chest and abdomen today  · Continue antibiotics per ID  · Target O2 88 to 90%  · We will need outpatient follow-up with high-resolution CT  · Will evaluate after CT chest, might need to consider bronchoscopy with BAL in light of history of immunosuppression    Addendum  Patient requiring high flow oxygen, more short of breath, will transfer to ICU for close monitoring, will need BAL, his INR now 2.8 will hold Coumadin and plan BAL when INR improves.       Electronically signed by Phil Reeves MD,  [facility]   on 12/8/2020 at 8:42 AM

## 2020-12-09 NOTE — PROGRESS NOTES
Infectious Disease     Patient Name: Vashti Rai  Date: 12/9/2020  YOB: 1950  Medical Record Number: [id number]        Right lung pneumonia    Repeat COVID-19 testing negative  Fevers remain persistently elevated more than 102  All cultures are negative    CT scan shows shotty mediastinal lymphadenopathy diffuse patchy alveolar infiltrates markings consistent with chronic interstitial disease possible pulmonary fibrosis also consistent with COVID-19 infection    Transferred to intensive care unit for worsening oxygenation  Episode of hypotension      Review of Systems   Constitutional: Positive for fever. Respiratory: Positive for shortness of breath. Negative for cough. Cardiovascular: Negative. Gastrointestinal: Negative. Physical Exam   Constitutional: He is oriented to person, place, and time. Cardiovascular: Normal heart sounds. No murmur heard. Pulmonary/Chest: Breath sounds normal. No respiratory distress. He has no wheezes. He has no rales. Abdominal: Soft. Bowel sounds are normal. He exhibits no distension and no mass. There is no abdominal tenderness. There is no rebound. Musculoskeletal:         General: No tenderness or edema. Neurological: He is alert and oriented to person, place, and time. Skin: Skin is warm. No rash noted. No erythema. Blood pressure 136/73, pulse 81, temperature 97.5 °F (36.4 °C), temperature source Oral, resp. rate 23, height 5' 10\" (1.778 m), weight 195 lb 15.8 oz (88.9 kg), SpO2 94 %.       .   Lab Results   Component Value Date    WBC 6.4 12/09/2020    HGB 7.6 (L) 12/09/2020    HCT 24.0 (L) 12/09/2020    MCV 82.3 12/09/2020     12/09/2020     Lab Results   Component Value Date     12/09/2020    K 4.3 12/09/2020    K 4.6 08/30/2019     12/09/2020    CO2 15 12/09/2020    BUN 56 12/09/2020    CREATININE 1.52 12/09/2020    GLUCOSE 158 12/09/2020    GLUCOSE 95 04/26/2012    CALCIUM 8.7 12/09/2020

## 2020-12-09 NOTE — FLOWSHEET NOTE
Pt resting in bed asleep no distress noted. 0920 In to assess pt. No complaints of pain. Just some discomfort to his esophagus from the high flow oxygen. 1022 Rounds done on pt. Dr. Chelita Crump in to dive pt he will not be having his bronchoscopy today due to his INR being high as well as his improvement. Pt has no questions at this time and is aware that he will be transferring back to [facility] at some point today. 1440 iv flushed at this time. Occlusion noted. Iv removed from his left forearm. New Iv started in his right forearm #20. Iv hung and infusing without difficulty. No further requests at this time. 1550 pt set up to wash up.   1555 Dr. Hero Flowers in to see pt.   1610 pt visiting with his wife at this time. No complaints or requests. 1818 Pt switched to non rebreather at this time.  And sent to one [address] [place] [address]

## 2020-12-09 NOTE — PROGRESS NOTES
Hospitalist Progress Note      Date of Admission: 12/1/2020  Chief Complaint:    Chief Complaint   Patient presents with    Shortness of Breath     for last  two weeks. Subjective:  No new complaints.   No nausea, vomiting, chest pain, or headache      Medications:    Infusion Medications   Scheduled Medications    methylPREDNISolone  40 mg Intravenous Q8H    pregabalin  75 mg Oral BID    SUMAtriptan  6 mg Subcutaneous Once    pill splitter   Does not apply Once    doxycycline (VIBRAMYCIN) IV  100 mg Intravenous Q12H    dilTIAZem  60 mg Oral 3 times per day    metoprolol tartrate  100 mg Oral BID    cefTRIAXone (ROCEPHIN) IV  1 g Intravenous Q24H    baclofen  10 mg Oral TID    clopidogrel  75 mg Oral Daily    CoQ-10  50 mg Oral BID    pantoprazole  40 mg Oral Daily    ezetimibe  10 mg Oral Daily    ferrous sulfate  325 mg Oral Daily with breakfast    folic acid  1 mg Oral Daily    gabapentin  100 mg Oral TID    [Held by provider] leflunomide  20 mg Oral Daily    [Held by provider] lisinopril  10 mg Oral Daily    [Held by provider] predniSONE  5 mg Oral Daily    [Held by provider] Tofacitinib Citrate ER  11 mg Oral Daily    magnesium oxide  400 mg Oral BID    sodium chloride flush  10 mL Intravenous 2 times per day    sodium chloride flush  10 mL Intravenous 2 times per day    atorvastatin  80 mg Oral Nightly     PRN Meds: ipratropium-albuterol, ketorolac, aspirin-acetaminophen-caffeine, sodium chloride flush, albuterol sulfate HFA, metoprolol, sodium chloride flush, acetaminophen **OR** acetaminophen, polyethylene glycol, promethazine **OR** ondansetron, nitroGLYCERIN    Intake/Output Summary (Last 24 hours) at 12/9/2020 1643  Last data filed at 12/9/2020 1448  Gross per 24 hour   Intake 750 ml   Output 1100 ml   Net -350 ml     Exam:  /73   Pulse 81   Temp 97.5 °F (36.4 °C) (Oral)   Resp 23   Ht 5' 10\" (1.778 m)   Wt 195 lb 15.8 oz (88.9 kg)   SpO2 94%   BMI 28.12 kg/m² Head: Normocephalic, atraumatic  Sclera clear  Neck JVD flat  Lungs: normal effort of breathing    Labs:   Recent Labs     12/08/20  0540 12/08/20  1257 12/09/20  0513   WBC 8.1 7.2 6.4   HGB 8.3* 7.2* 7.6*   HCT 26.1* 22.9* 24.0*    188 193     Recent Labs     12/07/20  0558 12/08/20  0540 12/09/20  0513   * 133* 133*   K 4.4 4.9 4.3   CL 98 100 101   CO2 18* 15* 15*   BUN 39* 46* 56*   CREATININE 1.32* 1.45* 1.52*   CALCIUM 7.7* 8.8 8.7   PHOS 1.8* 3.0 5.0*   AST 47*  --   --    ALT 35  --   --    BILIDIR <0.2  --   --    BILITOT 0.3  --   --    ALKPHOS 48  --   --      Recent Labs     12/07/20  0558 12/08/20  0540 12/09/20  0513   INR 2.9 2.8 3.2     No results for input(s): Parth Means in the last 72 hours. Radiology:  CT CHEST WO CONTRAST   Final Result   THERE IS SHOTTY MEDIASTINAL LYMPHADENOPATHY IN BOTH SIDES ARE DIFFUSELY INVOLVED WITH PATCHY ALVEOLAR OPACITIES THROUGHOUT ALL SEGMENTS. IN ADDITION THERE ARE INCREASED RETICULAR MARKINGS MOST NOTABLY IN THE RIGHT APEX AND AT THE PERIPHERY OF    BOTH LUNGS INDICATING CHRONIC INTERSTITIAL DISEASE AND POSSIBLE EARLY PULMONARY FIBROSIS. SUPERINFECTION DUE TO COVID-19 VERSUS OTHER ETIOLOGIES MAY BE CONSIDERED. EXAMINATION: CT ABDOMEN PELVIS WO CONTRAST, CT CHEST WO CONTRAST       TECHNIQUE: Contiguous axial CT sections of the abdomen and pelvis. Imaging was obtained after IV contrast administration. .  All CT scans at this facility use dose modulation, iterative reconstruction, and/or weight based dosing when appropriate to    reduce radiation dose to as low as reasonably achievable. FINDINGS ABDOMEN AND PELVIS:      Liver: The liver is normal in size and attenuation without intra or extrahepatic bile duct dilatation. There are no focal lesions. There is no intra or extrahepatic bile duct dilatation. Gallbladder: No calcified gallstones. Normal gallbladder wall. No pericholecystic fluid. Spleen:  There are no focal lesions or calcifications in the spleen. There is no splenomegaly        Pancreas: The pancreas is normal in size and attenuation without focal lesions or dilatation of the pancreatic duct. Kidneys: There are no solid or cystic lesions. There is no hydronephrosis. Adrenal glands are negative. Bowel: There is no evidence of bowel obstruction. There are diverticula of the descending and sigmoid colon without evidence of diverticulitis. Appendix: There  is no CT evidence for appendicitis. Nodes: No lymphadenopathy. Aorta: No aneurysm        Peritoneum: No free fluid or free air. The abdominal wall is intact. Pelvis: There are no solid or cystic lesions. The urinary bladder is within normal limits. Bones: There are no acute osseous changes. IMPRESSION: NO ACUTE PATHOLOGY IN THE ABDOMEN AND PELVIS. CT ABDOMEN PELVIS WO CONTRAST Additional Contrast? None   Final Result   THERE IS SHOTTY MEDIASTINAL LYMPHADENOPATHY IN BOTH SIDES ARE DIFFUSELY INVOLVED WITH PATCHY ALVEOLAR OPACITIES THROUGHOUT ALL SEGMENTS. IN ADDITION THERE ARE INCREASED RETICULAR MARKINGS MOST NOTABLY IN THE RIGHT APEX AND AT THE PERIPHERY OF    BOTH LUNGS INDICATING CHRONIC INTERSTITIAL DISEASE AND POSSIBLE EARLY PULMONARY FIBROSIS. SUPERINFECTION DUE TO COVID-19 VERSUS OTHER ETIOLOGIES MAY BE CONSIDERED. EXAMINATION: CT ABDOMEN PELVIS WO CONTRAST, CT CHEST WO CONTRAST       TECHNIQUE: Contiguous axial CT sections of the abdomen and pelvis. Imaging was obtained after IV contrast administration. .  All CT scans at this facility use dose modulation, iterative reconstruction, and/or weight based dosing when appropriate to    reduce radiation dose to as low as reasonably achievable. FINDINGS ABDOMEN AND PELVIS:      Liver: The liver is normal in size and attenuation without intra or extrahepatic bile duct dilatation. There are no focal lesions.   There is no intra or extrahepatic bile duct dilatation. Gallbladder: No calcified gallstones. Normal gallbladder wall. No pericholecystic fluid. Spleen: There are no focal lesions or calcifications in the spleen. There is no splenomegaly        Pancreas: The pancreas is normal in size and attenuation without focal lesions or dilatation of the pancreatic duct. Kidneys: There are no solid or cystic lesions. There is no hydronephrosis. Adrenal glands are negative. Bowel: There is no evidence of bowel obstruction. There are diverticula of the descending and sigmoid colon without evidence of diverticulitis. Appendix: There  is no CT evidence for appendicitis. Nodes: No lymphadenopathy. Aorta: No aneurysm        Peritoneum: No free fluid or free air. The abdominal wall is intact. Pelvis: There are no solid or cystic lesions. The urinary bladder is within normal limits. Bones: There are no acute osseous changes. IMPRESSION: NO ACUTE PATHOLOGY IN THE ABDOMEN AND PELVIS. XR CHEST PORTABLE   Final Result   THERE IS INCREASING MULTIFOCAL TO COALESCENT AIRSPACE DISEASE WITHIN THE RIGHT UPPER RIGHT MIDDLE AND RIGHT LOWER LOBES AS COMPARED TO THE PRIOR EXAMINATION. COULD CONSIDER CT SCAN THE CHEST WITHOUT IV CONTRAST TO FURTHER EVALUATE IF CLINICALLY INDICATED      XR CHEST PORTABLE   Final Result      STABLE MILD PULMONARY INFILTRATES, PREDOMINANTLY OF THE INFERIOR ASPECT OF THE RIGHT UPPER LOBE. CTA CHEST W WO CONTRAST   Final Result      No CT evidence pulmonary embolism. Emphysema. Dependent subsegmental atelectatic changes discussed, right greater than left, with corresponding signs of air trapping, greatest in right lung. All CT scans at this facility use dose modulation, iterative reconstruction, and/or weight based dosing when appropriate to reduce radiation dose to as low as reasonably achievable. XR CHEST PORTABLE   Final Result   SUSPECT RIGHT UPPER LUNG ZONE INFILTRATE. Assessment/Plan:    Shortness of breath might be secondary to interstitial pneumonia either from autoimmune disease or drug-induced secondary to leflunomide and methotrexate. Patient currently not receiving either medication. Steroids increased by pulm. Steroids likely benefitting resp status. Following with pulm.      35 minutes total care time, >1/2 in unit/floor time and care coordination      Rabia Gaona MD

## 2020-12-09 NOTE — PROGRESS NOTES
Nutrition Assessment     Type and Reason for Visit: RD Nutrition Re-Screen/LOS    Nutrition Recommendations/Plan:  Continue current plan of care    Nutrition Assessment:  Nutritional status appears adequate at this time.  Pt tolerating diet with intake > 75%, skin inact, labs/meds reviewed#    Malnutrition Assessment:  Malnutrition Status: No malnutrition    Current Nutrition Therapies:    DIET GENERAL; No Caffeine    Anthropometric Measures:  · Height: 5' 10\" (177.8 cm)  · Current Body Wt: 195 lb (88.5 kg)   · BMI: 28    Nutrition Diagnosis:   No nutrition diagnosis at this time     Nutrition Interventions:   Food and/or Nutrient Delivery:  Continue Current Diet  Nutrition Education/Counseling:  Education not indicated   Coordination of Nutrition Care:  No recommendation at this time, Continue to monitor while inpatient    Goals:  po > 75%, maintain wt ~195#       Nutrition Monitoring and Evaluation:     Food/Nutrient Intake Outcomes:  Diet Advancement/Tolerance  Physical Signs/Symptoms Outcomes:  Meal Time Behavior, Weight     Discharge Planning:    No discharge needs at this time     Electronically signed by Dominick Canela RD, LD on 12/9/20 at 10:28 AM EST

## 2020-12-09 NOTE — PROGRESS NOTES
1900pm Report from [facility]. VSS Alert and oriented. High flow 02 via bubbler. Expiratory Wheezes with crackles. SOB with exertion and slight at rest. Denies pain. Transferred to TCU bed 3 via bed  2000pm BP87/57 MAP 53 asymptomatic  2100pm Dr Martha Guy in and aware of low BP Orders  2300pm Resting with eyes closed  0000am Resting with eyes closed. BP 98/61 NPO   0500am Up to toilet. SOB. Sats dropped to 82. Remains on High low 02 Bubbler at 10L  0540am Sats up to 94. Dropping to 89-90 at intervals.  Patient in bed with eyes closed

## 2020-12-09 NOTE — PROGRESS NOTES
Progress Note  Patient: Jarod Ruvalcaba  Unit/Bed: TC03/TC03-01  YOB: 1950  MRN: [id number]  Acct: [de-identified]   Admitting Diagnosis: Anginal equivalent [facility]) [I20.8]  NSTEMI (non-ST elevated myocardial infarction) [facility]) [I21.4]  Date:  12/1/2020  Hospital Day: 7    Chief Complaint:  Shortness of breath with exertion    Subjective      12/9/20: Seen in TCU. On high flow O2 at 10L/min and SPO2 99%. Afebrile this morning. Remain SOB with any exertion. Denies chest pain. Awaiting bronchoscopy later today. CT Chest yesterday with patchy alveolar opacities throughout all segments and increased reticular markings most notably in right apex and at periphery of both lungs indicating chronic interstitial disease and possible early fibrosis. RA meds including leflunamide and tofacitinib on HOLD. Remains on IV doxycycline and Rocephin. ID following. INR 3.2 and coumadin on HOLD. On telemetry, SR/ST 90s-100s with frequent PVCs. On Lopressor 100mg PO BID and cardizem 60mg PO Q8H for rate control. Lisinopril on HOLD for now given STARR with creatinine 1.52 this morning. Received lasix 20mg IV x 1 yesterday. 5.5 liter positive fluid balance since admission. 12/8/20: Admitted with chest pain. Cardiac catheterization showed patent stent. Spiked temperature. Etiology of fever is still undetermined. ID and pulmonary on the case. Because of requiring more oxygen, being transferred to ICU for closer monitoring    12/7/20: Feeling slightly better. Temperature 100.2 SpO2 is 90%. Dr Devorah Frederick his rheumatologist at [facility]. Needs to make an appointment to see after discharge cardiac status stable. Appetite slightly improved. Oxygen still not optimal    12/6/20: spiking fever overnight. Feels ok. No CP no sob.    EKG: AF rate 90    12/4/20: Presented with angina. Cardiac catheterization showed patent stents he then developed fever of 102. Was evaluated by ID. Started on Rocephin.   Atrial fibrillation remains uncontrolled and was started on Cardizem. Coumadin was held and vitamin K given before cath and was resumed after the cath and being bridged with Lovenox    12/3/20: Patient underwent left heart catheterization with Dr. Teofilo Bhatti yesterday which revealed patent previously placed LAD, circumflex and RCA stents for which medical therapy advised. Internal medicine was consulted yesterday for evaluation of possible pneumonia as per chest x-ray however felt to have low suspicion of pneumonia at that time. Pulmonary was consulted for further evaluation of shortness of breath complaints. Overnight and early this morning, patient has been febrile with temperature as high as 102 °F.  Blood cultures checked in ER are negative to date. Covid testing negative x2. Initial chest x-ray on 12/1/2020 showed patchy area of increased opacity in the right midlung zone suspicious for small infiltrate but no repeat x-ray has been ordered. Also, patient with recurrent episode of A. fib RVR early this morning with heart rate as high as 160s to 170s range but later converted back to sinus rhythm. Currently on telemetry, patient is A. fib with heart rates 100s to 110s with occasional to frequent PVCs. Patient is resting comfortably in bed and in no acute distress. Exhibiting mild conversational dyspnea. While ambulating to the bathroom earlier this afternoon, patient experienced mild shortness of breath. He is currently on full dose anticoagulation with Lovenox 1 mg/kg subcu twice daily as INR is subtherapeutic at 1.2 this morning. Coumadin was resumed post cath yesterday. Pulmonary evaluation pending. 12/2/20:  This is a very pleasant 29-year-old  male with past medical history significant for coronary artery disease status post multiple PCI with most recent PCI with drug-eluting stent of the ostial LAD on 1/12/2019 and proximal to mid circumflex on 1/31/2019, hypertension, dyslipidemia, rheumatoid arthritis, history of paroxysmal atrial fibrillation, history of DVT and PE, chronic anticoagulation with Coumadin, history of GI bleed secondary to duodenal ulcer, former tobacco abuse and multiple medical problems who presented to the emergency room yesterday with chief complaint of shortness of breath with exertion. Over the past 2 weeks or so patient's been experiencing progressively worsening shortness of breath with less and less activity. He states on Saturday he walked from his bedroom to the bathroom and was gasping for breath. He has slight cough at times. On Saturday he did have a fever of 102 °F at home. He presented to [facility] clinic the ER on 11/28/2020 and again on 11/29/2020 due to these above-noted symptoms and Covid test was completed and negative and both times was discharged home. He followed up with his PCP yesterday and due to concerns for cardiac etiology he was sent to [facility] ER for further evaluation.     On presentation to the emergency room, blood pressure 138/80, heart rate 94, respiratory rate 20, pulse ox 94%, temperature 98.6 °F.  Sodium low 131, potassium elevated 5.2, chloride 99, total CO2 of 20, BUN elevated at 33, creatinine elevated 1.23, GFR low at 58.5, glucose 125. Magnesium low at 1.4. Lactic acid elevated at 3.4. Troponin elevated at 0.141. AST mildly elevated 45, ALT 33. TSH low at 0.420 and free T4 low at 0.76. WBC 8.0, hemoglobin low at 9.0, hematocrit low at 27.9, platelets 481. INR therapeutic at 2.7. D-dimer mildly elevated 0.83. Blood cultures checked and pending. Initial Covid test negative. CTA of the chest completed on 12/1/2020 revealed no evidence of pulmonary embolism, emphysema, dependent subsegmental atelectatic changes right greater than left with corresponding signs of air trapping greatest in the right lung. Chest x-ray revealed patchy area of increased opacity in the right midlung zone suspicious for small infiltrate.   EKG revealed sinus rhythm with no acute ischemic changes. He was admitted for further evaluation.     At time of evaluation today, he is seen on 5 W. resting comfortably and in no acute distress. Covid precautions in place and second Covid test pending this afternoon. Initial Covid test was negative. His initial troponin in the ER was elevated at 0.141 and repeat troponin pending this morning. proBNP only 734. Repeat BMP pending. He is hemodynamically stable. On telemetry he is sinus rhythm to sinus tachycardia with heart rates 90s to 100s. Telemetry alarms reviewed show brief episode of PA fib with RVR with heart rate in the 140s yesterday evening between 2054 and 2057. He is in no distress at this time. His Coumadin is currently on hold and will plan to initiate anticoagulation with Lovenox when INR less than 2 while Coumadin remains on hold. Per patient, he has chronic anemia with hemoglobin typically around 9. No overt signs of bleeding although his stools are always dark in color as he takes iron supplements. Review of Systems:   Review of Systems   Constitutional: Negative for activity change and fever. HENT: Negative for congestion. Respiratory: Positive for shortness of breath. Negative for chest tightness. Cardiovascular: Negative for chest pain, palpitations and leg swelling. Gastrointestinal: Negative for abdominal pain, nausea and vomiting. Genitourinary: Negative for difficulty urinating. Musculoskeletal: Negative for arthralgias. Skin: Negative for color change, pallor and rash. Neurological: Positive for tremors. Negative for dizziness, syncope and light-headedness. Psychiatric/Behavioral: Negative for agitation and behavioral problems. Physical Examination:    /63   Pulse 81   Temp 98 °F (36.7 °C) (Oral)   Resp 17   Ht 5' 10\" (1.778 m)   Wt 195 lb 15.8 oz (88.9 kg)   SpO2 99%   BMI 28.12 kg/m²    Physical Exam  Constitutional:       Appearance: Normal appearance.  He is obese. HENT:      Head: Normocephalic and atraumatic. Neck:      Musculoskeletal: Normal range of motion and neck supple. Cardiovascular:      Rate and Rhythm: Normal rate and regular rhythm. Pulmonary:      Effort: Respiratory distress (labored breathing; on high flow O2) present. Breath sounds: Rales (faint crackles bilaterally) present. No wheezing or rhonchi. Comments: Mild conversational dyspnea  Abdominal:      Palpations: Abdomen is soft. Tenderness: There is no abdominal tenderness. Musculoskeletal: Normal range of motion. Right lower leg: No edema. Left lower leg: No edema. Skin:     General: Skin is warm and dry. Neurological:      General: No focal deficit present. Mental Status: He is alert and oriented to person, place, and time. Cranial Nerves: No cranial nerve deficit.    Psychiatric:         Mood and Affect: Mood normal.         Behavior: Behavior normal.         LABS:  CBC:   Lab Results   Component Value Date    WBC 6.4 12/09/2020    RBC 2.91 12/09/2020    RBC 4.38 04/26/2012    HGB 7.6 12/09/2020    HCT 24.0 12/09/2020    MCV 82.3 12/09/2020    MCH 26.0 12/09/2020    MCHC 31.5 12/09/2020    RDW 31.8 12/09/2020     12/09/2020    MPV 8.3 04/26/2012     CBC with Differential:   Lab Results   Component Value Date    WBC 6.4 12/09/2020    RBC 2.91 12/09/2020    RBC 4.38 04/26/2012    HGB 7.6 12/09/2020    HCT 24.0 12/09/2020     12/09/2020    MCV 82.3 12/09/2020    MCH 26.0 12/09/2020    MCHC 31.5 12/09/2020    RDW 31.8 12/09/2020    LYMPHOPCT 3.5 12/08/2020    MONOPCT 2.4 12/08/2020    BASOPCT 0.3 12/08/2020    MONOSABS 0.2 12/08/2020    LYMPHSABS 0.3 12/08/2020    EOSABS 0.0 12/08/2020    BASOSABS 0.0 12/08/2020     CMP:    Lab Results   Component Value Date     12/09/2020    K 4.3 12/09/2020    K 4.6 08/30/2019     12/09/2020    CO2 15 12/09/2020    BUN 56 12/09/2020    CREATININE 1.52 12/09/2020    GFRAA 55.4 12/09/2020 LABGLOM 45.8 12/09/2020    GLUCOSE 158 12/09/2020    GLUCOSE 95 04/26/2012    PROT 5.7 12/07/2020    LABALBU 2.2 12/09/2020    LABALBU 4.5 04/26/2012    CALCIUM 8.7 12/09/2020    BILITOT 0.3 12/07/2020    ALKPHOS 48 12/07/2020    AST 47 12/07/2020    ALT 35 12/07/2020     BMP:    Lab Results   Component Value Date     12/09/2020    K 4.3 12/09/2020    K 4.6 08/30/2019     12/09/2020    CO2 15 12/09/2020    BUN 56 12/09/2020    LABALBU 2.2 12/09/2020    LABALBU 4.5 04/26/2012    CREATININE 1.52 12/09/2020    CALCIUM 8.7 12/09/2020    GFRAA 55.4 12/09/2020    LABGLOM 45.8 12/09/2020    GLUCOSE 158 12/09/2020    GLUCOSE 95 04/26/2012     Magnesium:    Lab Results   Component Value Date    MG 2.1 12/08/2020     Troponin:    Lab Results   Component Value Date    TROPONINI 0.065 12/02/2020       Radiology:    CT Chest/Abdomen/Pelvis WO Contrast 12/8/20:  Impression    THERE IS SHOTTY MEDIASTINAL LYMPHADENOPATHY IN BOTH SIDES ARE DIFFUSELY INVOLVED WITH PATCHY ALVEOLAR OPACITIES THROUGHOUT ALL SEGMENTS. IN ADDITION THERE ARE INCREASED RETICULAR MARKINGS MOST NOTABLY IN THE RIGHT APEX AND AT THE PERIPHERY OF     BOTH LUNGS INDICATING CHRONIC INTERSTITIAL DISEASE AND POSSIBLE EARLY PULMONARY FIBROSIS. SUPERINFECTION DUE TO COVID-19 VERSUS OTHER ETIOLOGIES MAY BE CONSIDERED.                EXAMINATION: CT ABDOMEN PELVIS WO CONTRAST, CT CHEST WO CONTRAST         TECHNIQUE: Contiguous axial CT sections of the abdomen and pelvis.  Imaging was obtained after IV contrast administration. [facility] Old CT scans at this facility use dose modulation, iterative reconstruction, and/or weight based dosing when appropriate to    reduce radiation dose to as low as reasonably achievable.         FINDINGS ABDOMEN AND PELVIS:         Liver: The liver is normal in size and attenuation without intra or extrahepatic bile duct dilatation.  There are no focal lesions.  There is no intra or extrahepatic bile duct dilatation.      scarring at right lung apex. Dependent subsegmental atelectatic change, right upper lobe, with mosaic morphology identified. Dependent subsegmental atelectatic change right lower lobe. Focal area of bullous change right lung base. No nodules, masses, pleural effusion. Left lung shows emphysematous change with fibrous scarring at left lung apex. Dependent subsegmental atelectatic change, left lung base. No osteoblastic, no osteolytic lesions. No CT evidence pulmonary embolism. Emphysema. Dependent subsegmental atelectatic changes discussed, right greater than left, with corresponding signs of air trapping, greatest in right lung. All CT scans at this facility use dose modulation, iterative reconstruction, and/or weight based dosing when appropriate to reduce radiation dose to as low as reasonably achievable. [facility] 12/2/20:   Procedure(s):  [facility], b/l coronary angio  Pre-operative Diagnosis:  SOB. CP  H&P Status: Completed and reviewed.      Post-operative Diagnosis:    LV EF of 60%, normal LVEDP  LM Separate ostium  LAD patent stents with mild LAD  CX patent stents  RCA patent stents     Findings:  See full report  Complications:  none  Primary Proceduralist:   Dr.Wes Harvey DO     Plan  RFM  Max med rx. Echocardiogram 12/2/20:   Conclusions   Summary   Left ventricular ejection fraction is visually estimated at 60%. Impaired relaxation compatible with diastolic dysfunction. ( reversed E/A   ratio)   Signature   ----------------------------------------------------------------   Electronically signed by Vipul Palacio(Interpreting physician)   on 12/02/2020 04:16 PM   ----------------------------------------------------------------   Findings  Left Ventricle  Left ventricular ejection fraction is visually estimated at 60%. Impaired relaxation compatible with diastolic dysfunction. ( reversed E/A  ratio)  Right Ventricle  Normal right ventricle structure and function.   Normal right ventricle systolic pressure. Left Atrium  Normal left atrium. Right Atrium  Normal right atrium. Mitral Valve  Normal mitral valve structure and function. Trace (1+) mitral regurgitation is present. Tricuspid Valve  There is Trace ( 1 +) tricuspid regurgitation with estimated RVSP of 19 mm  Hg. Aortic Valve  Normal aortic valve structure and function. Pulmonic Valve  Normal pulmonic valve structure and function. Pericardial Effusion  No evidence of pericardial effusion. Pleural Effusion  No evidence of pleural effusion. Aorta \ Miscellaneous  Miscellaneous normal findings were found. EKG 12/1/20: SR 89, no acute ischemic changes, QTc 401ms     Telemetry 12/2/20: SR/ST 90s-100s; PAF RVR with HR 140s yesterday between [id number]  Telemetry 12/3/20: SR 80s, occasional PVCs; PA-fib RVR early AM around 8:06  Telemetry 12/9/20: SR/ST 90s-100s, frequent PVCs      Assessment:    Active Hospital Problems    Diagnosis Date Noted    NSTEMI (non-ST elevated myocardial infarction) ([address] [I21.4] 12/02/2020    Anginal equivalent ([address] [I20.8] 12/01/2020    Acute chest pain [R07.9] 12/01/2020     1. NSTEMI s/p [facility] with patent previously placed stents--med Rx  2. Dyspnea on exertion--secondary to A-fib RVR +/- COPD vs other  3. Hx CAD s/p PCI of RCA in 2018 at [facility] [place] and PCI QIAN ostial LAD 1/12/19 and prox/mid circ 1/31/19  4. PA-fib RVR  5. Hx PE  6. Chronic anticoagulation with coumadin  7. Rheumatoid arthritis  8. HTN  9. Dyslipidemia  10. ? RML pneumonia per CXR  11. Chronic anemia--Hgb 7.6  12. Hx GIB/duodenal ulcer  13. Fever of unknown origin  14. STARR  15. Acute respiratory failure     Plan:  1.  Maximize medical therapy- discontinue aspirin given Hx GIB/anemia, Plavix 75 mg p.o. daily, Lopressor 100 mg p.o. twice daily for improved rate control, Cardizem 60 mg p.o. every 8 hours, Lipitor 80 mg tablet p.o. daily, lisinopril 10 mg p.o. daily on HOLD due to STARR, all other home medications as ordered, sublingual nitroglycerin as

## 2020-12-09 NOTE — PROGRESS NOTES
[facility] Department  Change in Status Communication Form      To the referring physician:    Due to an acute change in this patient's medical status, new Occupational Therapy Eval and Treatment orders are required. Pt. has transferred to TCU. Please reorder when pt. is medically stable to resume OOB activity, as appropriate. We thank you for your referral.     [facility].      Electronically signed by COLTEN Cervantes on 12/9/20 at 8:14 AM EST

## 2020-12-09 NOTE — PROGRESS NOTES
Physical Therapy  Physical Therapy   Facility/Department: [facility] MED SURG TC03/TC03-01    NAME: Karis Gao    : 1950 (78 y.o.)  MRN: [id number]    Account: [de-identified]  Gender: male    Hold PT treatment on this date d/t patient had a change in medical status. Pt moved from [place] [address] to [address] [place] [address]. Change in medical status discussed with supervising PT on this date. Please re-consult physical therapy when appropriate. Note written to attending hospitalist  via \"sticky note\" requesting updated Physical Therapy eval and treat orders when pt is appropriate for out of bed activity.       [facility]) Physical Therapy Department      Electronically signed by Charles Tiwari PTA on 20 at 9:23 AM EST

## 2020-12-10 NOTE — PROGRESS NOTES
INPATIENT PROGRESS NOTES    PATIENT NAME: Ngoc Leon  MRN: [id number]  SERVICE DATE:  December 10, 2020   SERVICE TIME:  2:10 PM      PRIMARY SERVICE: Pulmonary Disease    CHIEF COMPLAINTS: Fever and acute hypoxia    INTERVAL HPI: Patient seen and examined at bedside, Interval Notes, orders reviewed. Nursing notes noted    Feels better, he significantly improved since we started Solu-Medrol, shortness of breath mainly exertional, no chest pain, slept well, no fever for the last 2 days    Review of system:     GI Abdominal pain No  Skin Rash No    Social History     Tobacco Use    Smoking status: Former Smoker     Packs/day: 1.00     Years: 50.00     Pack years: 50.00     Types: Cigarettes     Last attempt to quit: 11/16/2017     Years since quitting: 3.0    Smokeless tobacco: Never Used   Substance Use Topics    Alcohol use: No         Problem Relation Age of Onset    High Blood Pressure Father     Heart Attack Father     Coronary Art Dis Father          OBJECTIVE    Body mass index is 27.84 kg/m². PHYSICAL EXAM:  Vitals:  BP (!) 151/57   Pulse 81   Temp 97.5 °F (36.4 °C) (Oral)   Resp 17   Ht 5' 10\" (1.778 m)   Wt 194 lb 0.1 oz (88 kg)   SpO2 (!) 89%   BMI 27.84 kg/m²     General: alert, cooperative, no distress  Head: normocephalic, atraumatic, no tenderness  Eyes:No gross abnormalities. ENT:  MMM no lesions  Neck:  supple and no masses  Chest : Minimal rales, good air movement, no wheezing, nontender, tympanic  Heart[de-identified] Heart sounds are normal.  Regular rate and rhythm without murmur, gallop or rub. ABD:  symmetric, soft, non-tender  Musculoskeletal : no cyanosis, no clubbing and no edema  Neuro:  Grossly normal  Skin: No rashes or nodules noted.   Lymph node:  no cervical nodes  Urology: No Pillai   Psychiatric: appropriate    DATA:   Recent Labs     12/09/20 0513 12/10/20  0555   WBC 6.4 7.7   HGB 7.6* 7.3*   HCT 24.0* 22.8*   MCV 82.3 82.6    205     Recent Labs     12/09/20 0513 12/10/20  0555   * 131*   K 4.3 5.1*    101   CO2 15* 17*   BUN 56* 52*   CREATININE 1.52* 1.06   GLUCOSE 158* 185*   CALCIUM 8.7 8.6   LABALBU 2.2* 2.4*   LABGLOM 45.8* >60.0   GFRAA 55.4* >60.0       MV Settings:     FiO2 : 50 %    No results for input(s): PHART, IIL9DGT, PO2ART, ZBI1YIM, BEART, W6KZGLWH in the last 72 hours.     O2 Device: High flow nasal cannula  O2 Flow Rate (L/min): 10 L/min    DIET GENERAL; No Caffeine     MEDICATIONS during current hospitalization:    Continuous Infusions:    Scheduled Meds:   enoxaparin  1 mg/kg Subcutaneous BID    methylPREDNISolone  40 mg Intravenous Q8H    pregabalin  75 mg Oral BID    SUMAtriptan  6 mg Subcutaneous Once    pill splitter   Does not apply Once    dilTIAZem  60 mg Oral 3 times per day    metoprolol tartrate  100 mg Oral BID    baclofen  10 mg Oral TID    clopidogrel  75 mg Oral Daily    CoQ-10  50 mg Oral BID    pantoprazole  40 mg Oral Daily    ezetimibe  10 mg Oral Daily    ferrous sulfate  325 mg Oral Daily with breakfast    folic acid  1 mg Oral Daily    gabapentin  100 mg Oral TID    [Held by provider] leflunomide  20 mg Oral Daily    [Held by provider] lisinopril  10 mg Oral Daily    [Held by provider] predniSONE  5 mg Oral Daily    [Held by provider] Tofacitinib Citrate ER  11 mg Oral Daily    magnesium oxide  400 mg Oral BID    sodium chloride flush  10 mL Intravenous 2 times per day    sodium chloride flush  10 mL Intravenous 2 times per day    atorvastatin  80 mg Oral Nightly       PRN Meds:ipratropium-albuterol, ketorolac, aspirin-acetaminophen-caffeine, sodium chloride flush, albuterol sulfate HFA, metoprolol, sodium chloride flush, acetaminophen **OR** acetaminophen, polyethylene glycol, promethazine **OR** ondansetron, nitroGLYCERIN    Radiology  Cta Chest W Wo Contrast    Result Date: 12/1/2020  EXAM: CT SCAN OF THE THORAX WITH CONTRAST/PE PROTOCOL/CTA CHEST COMPARISON: CHEST RADIOGRAPHS, DECEMBER 1, 2020, APRIL 2, 2019. REASON FOR EXAMINATION:  SHORTNESS OF BREATH FOR SEVERAL WEEKS TECHNIQUE: Helical CTA was performed through the chest utilizing 100 mL of Isovue 300 intravenous contrast medium. .  Images were obtained with bolus tracking in order to opacify the pulmonary arteries. Thick section coronal MIP 3D reconstructions were performed on a separate workstation. FINDINGS:  No intraluminal filling defects, pulmonary arterial tree. Thoracic aorta normal in course and caliber. Cardiac size normal. No pericardial effusion. Coronary calcification identified. No hilar, mediastinal, or axillary lymph node enlargement. Limited imaging upper abdomen shows small hiatal hernia. Adrenal glands without anomaly. Right lung shows emphysematous change, with fibrous scarring at right lung apex. Dependent subsegmental atelectatic change, right upper lobe, with mosaic morphology identified. Dependent subsegmental atelectatic change right lower lobe. Focal area of bullous change right lung base. No nodules, masses, pleural effusion. Left lung shows emphysematous change with fibrous scarring at left lung apex. Dependent subsegmental atelectatic change, left lung base. No osteoblastic, no osteolytic lesions. No CT evidence pulmonary embolism. Emphysema. Dependent subsegmental atelectatic changes discussed, right greater than left, with corresponding signs of air trapping, greatest in right lung. All CT scans at this facility use dose modulation, iterative reconstruction, and/or weight based dosing when appropriate to reduce radiation dose to as low as reasonably achievable. Xr Chest Portable    Result Date: 12/3/2020  XR CHEST PORTABLE : 12/3/2020 CLINICAL HISTORY:  Right lung pneumonia . COMPARISON: Portable chest and chest CTA 12/1/2020. TECHNIQUE: Two portable upright AP radiographs of the chest were obtained.  FINDINGS: Mild mid to lower lung field pulmonary infiltrates predominantly within the inferior aspect of the right upper lobe have not significantly changed from the prior studies. There is no significant pleural effusion, cardiomegaly, vascular congestion, pneumothorax, or displaced fractures identified. STABLE MILD PULMONARY INFILTRATES, PREDOMINANTLY OF THE INFERIOR ASPECT OF THE RIGHT UPPER LOBE. Xr Chest Portable    Result Date: 12/1/2020  EXAMINATION: XR CHEST PORTABLE. DATE AND TIME:12/1/2020 12:12 PM CLINICAL HISTORY: SHORTNESS OF BREATH   SOB  COMPARISONS: FEBRUARY 27, 2019  FINDINGS: Patchy area of increased opacity in the right midlung zone suspicious  for small infiltrate. A Chest x-ray is insensitive in mild or early COVID-19  infection. Furthermore the specificity of chest x-ray findings for COVID-19 pneumonia is not established. Remaining lung fields clear. Heart and mediastinum within normal limits. SUSPECT RIGHT UPPER LUNG ZONE INFILTRATE.        Chest x-ray shows nonspecific groundglass changes      IMPRESSION AND SUGGESTION:  Patient is at risk due to   · Acute hypoxic story failure, secondary to worsening pulmonary infiltrate  · Likely exacerbation of interstitial connective tissue disease versus drug-induced pneumonitis  · Patient responded to steroid and improved, fever resolved  · Mild obstructive airway disease, with mild emphysema currently no signs of exacerbation  · Paroxysmal A. fib with RVR  · Rheumatoid arthritis  · History of DVT and PE, on Coumadin  · Headache, consistent with migraine headache      Recommendations  · Continue Solu-Medrol  · Chest x-ray tomorrow  · INR is 4, will hold on bronchoscopy for now  · Target O2 88 to 90%  · We will need outpatient follow-up with high-resolution CT      Electronically signed by Linnette Pennington MD,  [facility]   on 12/10/2020 at 2:10 PM

## 2020-12-10 NOTE — FLOWSHEET NOTE
AM assessment completed. Patient resting in bed at this time with no complaints. Denies any chest pain at this time. Remains atrial fibrillation/NSR on the monitor. No edema noted. Pedal pulses palpable. Shortness of breath noted with any exertion and at rest. Lungs are very diminished bilaterally with expiratory wheezes throughout. SATS 95% on 10L high flow O2. Occasional moist, non-productive cough noted. He is A/Ox3 and is up with a stand-by assist in the room. Denies any pain with elimination. Last BM 12-9-20. Skin remains warm, dry and intact. Bruising noted to bilateral upper extremities. #20g SL to right forearm, flushed and patent. Vital signs stable and AM medications provided. Call light remains in reach.  Electronically signed by Kesha Major RN on 12/10/2020 at 12:31 PM

## 2020-12-10 NOTE — PROGRESS NOTES
Hospitalist Progress Note      Date of Admission: 12/1/2020  Chief Complaint:    Chief Complaint   Patient presents with    Shortness of Breath     for last  two weeks. Subjective:  No new complaints.   No nausea, vomiting, chest pain, or headache      Medications:    Infusion Medications   Scheduled Medications    enoxaparin  1 mg/kg Subcutaneous BID    methylPREDNISolone  40 mg Intravenous Q8H    pregabalin  75 mg Oral BID    SUMAtriptan  6 mg Subcutaneous Once    pill splitter   Does not apply Once    dilTIAZem  60 mg Oral 3 times per day    metoprolol tartrate  100 mg Oral BID    baclofen  10 mg Oral TID    clopidogrel  75 mg Oral Daily    CoQ-10  50 mg Oral BID    pantoprazole  40 mg Oral Daily    ezetimibe  10 mg Oral Daily    ferrous sulfate  325 mg Oral Daily with breakfast    folic acid  1 mg Oral Daily    gabapentin  100 mg Oral TID    [Held by provider] leflunomide  20 mg Oral Daily    [Held by provider] lisinopril  10 mg Oral Daily    [Held by provider] predniSONE  5 mg Oral Daily    [Held by provider] Tofacitinib Citrate ER  11 mg Oral Daily    magnesium oxide  400 mg Oral BID    sodium chloride flush  10 mL Intravenous 2 times per day    sodium chloride flush  10 mL Intravenous 2 times per day    atorvastatin  80 mg Oral Nightly     PRN Meds: ipratropium-albuterol, ketorolac, aspirin-acetaminophen-caffeine, sodium chloride flush, albuterol sulfate HFA, metoprolol, sodium chloride flush, acetaminophen **OR** acetaminophen, polyethylene glycol, promethazine **OR** ondansetron, nitroGLYCERIN    Intake/Output Summary (Last 24 hours) at 12/10/2020 1716  Last data filed at 12/10/2020 1657  Gross per 24 hour   Intake 720 ml   Output 1825 ml   Net -1105 ml     Exam:  /70   Pulse 93   Temp 97.5 °F (36.4 °C) (Oral)   Resp 18   Ht 5' 10\" (1.778 m)   Wt 194 lb 0.1 oz (88 kg)   SpO2 96%   BMI 27.84 kg/m²   Head: Normocephalic, atraumatic  Sclera clear  Neck JVD flat  Lungs: normal effort of breathing    Labs:   Recent Labs     12/08/20  1257 12/09/20  0513 12/10/20  0555   WBC 7.2 6.4 7.7   HGB 7.2* 7.6* 7.3*   HCT 22.9* 24.0* 22.8*    193 205     Recent Labs     12/08/20  0540 12/09/20  0513 12/10/20  0555   * 133* 131*   K 4.9 4.3 5.1*    101 101   CO2 15* 15* 17*   BUN 46* 56* 52*   CREATININE 1.45* 1.52* 1.06   CALCIUM 8.8 8.7 8.6   PHOS 3.0 5.0* 3.2     Recent Labs     12/08/20  0540 12/09/20  0513 12/10/20  0555   INR 2.8 3.2 4.1     No results for input(s): Parth Harper in the last 72 hours. Radiology:  CT CHEST WO CONTRAST   Final Result   THERE IS SHOTTY MEDIASTINAL LYMPHADENOPATHY IN BOTH SIDES ARE DIFFUSELY INVOLVED WITH PATCHY ALVEOLAR OPACITIES THROUGHOUT ALL SEGMENTS. IN ADDITION THERE ARE INCREASED RETICULAR MARKINGS MOST NOTABLY IN THE RIGHT APEX AND AT THE PERIPHERY OF    BOTH LUNGS INDICATING CHRONIC INTERSTITIAL DISEASE AND POSSIBLE EARLY PULMONARY FIBROSIS. SUPERINFECTION DUE TO COVID-19 VERSUS OTHER ETIOLOGIES MAY BE CONSIDERED. EXAMINATION: CT ABDOMEN PELVIS WO CONTRAST, CT CHEST WO CONTRAST       TECHNIQUE: Contiguous axial CT sections of the abdomen and pelvis. Imaging was obtained after IV contrast administration. .  All CT scans at this facility use dose modulation, iterative reconstruction, and/or weight based dosing when appropriate to    reduce radiation dose to as low as reasonably achievable. FINDINGS ABDOMEN AND PELVIS:      Liver: The liver is normal in size and attenuation without intra or extrahepatic bile duct dilatation. There are no focal lesions. There is no intra or extrahepatic bile duct dilatation. Gallbladder: No calcified gallstones. Normal gallbladder wall. No pericholecystic fluid. Spleen: There are no focal lesions or calcifications in the spleen. There is no splenomegaly        Pancreas:  The pancreas is normal in size and attenuation without focal lesions or dilatation of the pancreatic duct. Kidneys: There are no solid or cystic lesions. There is no hydronephrosis. Adrenal glands are negative. Bowel: There is no evidence of bowel obstruction. There are diverticula of the descending and sigmoid colon without evidence of diverticulitis. Appendix: There  is no CT evidence for appendicitis. Nodes: No lymphadenopathy. Aorta: No aneurysm        Peritoneum: No free fluid or free air. The abdominal wall is intact. Pelvis: There are no solid or cystic lesions. The urinary bladder is within normal limits. Bones: There are no acute osseous changes. IMPRESSION: NO ACUTE PATHOLOGY IN THE ABDOMEN AND PELVIS. CT ABDOMEN PELVIS WO CONTRAST Additional Contrast? None   Final Result   THERE IS SHOTTY MEDIASTINAL LYMPHADENOPATHY IN BOTH SIDES ARE DIFFUSELY INVOLVED WITH PATCHY ALVEOLAR OPACITIES THROUGHOUT ALL SEGMENTS. IN ADDITION THERE ARE INCREASED RETICULAR MARKINGS MOST NOTABLY IN THE RIGHT APEX AND AT THE PERIPHERY OF    BOTH LUNGS INDICATING CHRONIC INTERSTITIAL DISEASE AND POSSIBLE EARLY PULMONARY FIBROSIS. SUPERINFECTION DUE TO COVID-19 VERSUS OTHER ETIOLOGIES MAY BE CONSIDERED. EXAMINATION: CT ABDOMEN PELVIS WO CONTRAST, CT CHEST WO CONTRAST       TECHNIQUE: Contiguous axial CT sections of the abdomen and pelvis. Imaging was obtained after IV contrast administration. .  All CT scans at this facility use dose modulation, iterative reconstruction, and/or weight based dosing when appropriate to    reduce radiation dose to as low as reasonably achievable. FINDINGS ABDOMEN AND PELVIS:      Liver: The liver is normal in size and attenuation without intra or extrahepatic bile duct dilatation. There are no focal lesions. There is no intra or extrahepatic bile duct dilatation. Gallbladder: No calcified gallstones. Normal gallbladder wall. No pericholecystic fluid. Spleen:  There are no focal lesions or calcifications in the spleen. There is no splenomegaly        Pancreas: The pancreas is normal in size and attenuation without focal lesions or dilatation of the pancreatic duct. Kidneys: There are no solid or cystic lesions. There is no hydronephrosis. Adrenal glands are negative. Bowel: There is no evidence of bowel obstruction. There are diverticula of the descending and sigmoid colon without evidence of diverticulitis. Appendix: There  is no CT evidence for appendicitis. Nodes: No lymphadenopathy. Aorta: No aneurysm        Peritoneum: No free fluid or free air. The abdominal wall is intact. Pelvis: There are no solid or cystic lesions. The urinary bladder is within normal limits. Bones: There are no acute osseous changes. IMPRESSION: NO ACUTE PATHOLOGY IN THE ABDOMEN AND PELVIS. XR CHEST PORTABLE   Final Result   THERE IS INCREASING MULTIFOCAL TO COALESCENT AIRSPACE DISEASE WITHIN THE RIGHT UPPER RIGHT MIDDLE AND RIGHT LOWER LOBES AS COMPARED TO THE PRIOR EXAMINATION. COULD CONSIDER CT SCAN THE CHEST WITHOUT IV CONTRAST TO FURTHER EVALUATE IF CLINICALLY INDICATED      XR CHEST PORTABLE   Final Result      STABLE MILD PULMONARY INFILTRATES, PREDOMINANTLY OF THE INFERIOR ASPECT OF THE RIGHT UPPER LOBE. CTA CHEST W WO CONTRAST   Final Result      No CT evidence pulmonary embolism. Emphysema. Dependent subsegmental atelectatic changes discussed, right greater than left, with corresponding signs of air trapping, greatest in right lung. All CT scans at this facility use dose modulation, iterative reconstruction, and/or weight based dosing when appropriate to reduce radiation dose to as low as reasonably achievable. XR CHEST PORTABLE   Final Result   SUSPECT RIGHT UPPER LUNG ZONE INFILTRATE.                   XR CHEST (2 VW)    (Results Pending)     Assessment/Plan:    Shortness of breath might be secondary to interstitial pneumonia either from autoimmune disease or drug-induced secondary to leflunomide and methotrexate. Patient currently not receiving either medication. Steroids increased by pulm. Steroids likely benefitting resp status, patient continues to feel better. Following with pulm.      35 minutes total care time, >1/2 in unit/floor time and care coordination      Eva Galo MD

## 2020-12-10 NOTE — PROGRESS NOTES
Infectious Disease     Patient Name: Jimi Farr  Date: 12/10/2020  YOB: 1950  Medical Record Number: [id number]        Right lung pneumonia    Repeat COVID-19 testing negative  Fevers remain persistently elevated more than 102  All cultures are negative    CT scan shows shotty mediastinal lymphadenopathy diffuse patchy alveolar infiltrates markings consistent with chronic interstitial disease possible pulmonary fibrosis also consistent with COVID-19 infection    Transferred to intensive care unit for worsening oxygenation  Episode of hypotension      Review of Systems   Constitutional: Negative for fever. Respiratory: Negative for cough and shortness of breath. Cardiovascular: Negative. Gastrointestinal: Negative. Physical Exam   Constitutional: He is oriented to person, place, and time. No distress. Cardiovascular: Normal heart sounds. No murmur heard. Pulmonary/Chest: Breath sounds normal. No respiratory distress. He has no wheezes. He has no rales. Abdominal: Soft. Bowel sounds are normal. He exhibits no distension and no mass. There is no abdominal tenderness. There is no rebound. Neurological: He is alert and oriented to person, place, and time. Skin: He is not diaphoretic. Blood pressure (!) 151/57, pulse 92, temperature 97.5 °F (36.4 °C), temperature source Oral, resp. rate 17, height 5' 10\" (1.778 m), weight 194 lb 0.1 oz (88 kg), SpO2 95 %.       .   Lab Results   Component Value Date    WBC 7.7 12/10/2020    HGB 7.3 (L) 12/10/2020    HCT 22.8 (L) 12/10/2020    MCV 82.6 12/10/2020     12/10/2020     Lab Results   Component Value Date     12/10/2020    K 5.1 12/10/2020    K 4.6 08/30/2019     12/10/2020    CO2 17 12/10/2020    BUN 52 12/10/2020    CREATININE 1.06 12/10/2020    GLUCOSE 185 12/10/2020    GLUCOSE 95 04/26/2012    CALCIUM 8.6 12/10/2020          High sensitivity CRP [[id number]] (Abnormal)  Collected: 12/03/20 9254       Specimen: Blood  Updated: 12/03/20 1914        CRP High Sensitivity  231.0  mg/L         Blood cultures remain no growth      ASSESSMENT  PLAN:      Treated initially with atypical pneumonia    Did not respond to antibiotics    Fevers have decreased since patient was placed on steroids for interstitial inflammatory disease secondary to medication versus rheumatoid arthritis    Discontinue antibiotics at this time

## 2020-12-10 NOTE — FLOWSHEET NOTE
1900 Assumed care of pt from [facility].    2042 Notified by MR that pt converted back to AF. Dr. Jewell Cm notified via [employer] serve. 2045 PM assessment and medications completed. Rocephin hung and infusing. 46 Notified by MR that pt converted to SR again. 0425 Notified by MR that pt is frequently converting back and forth between AF and SR. Dr. Jewell Cm notified via perfect serve. Also informed that pt is not on a blood thinner.

## 2020-12-10 NOTE — PROGRESS NOTES
Progress Note  Patient: Asia Scott  Unit/Bed: Y585/I288-12  YOB: 1950  MRN: [id number]  Acct: [de-identified]   Admitting Diagnosis: Anginal equivalent [facility]) [I20.8]  NSTEMI (non-ST elevated myocardial infarction) [facility]) [I21.4]  Date:  12/1/2020  Hospital Day: 8    Chief Complaint:  Chest pain and pneumonia. Unidentified organism. FUO    Subjective  Patient was transferred by Dr Chandu Rojas to [facility] Because of hypoxemia and continuous fevers. The dose of steroids was increased and has done well since then. Hemoglobin 7.3. WBC 7.7 I am surprised the [facility] has not gone up despite being on high-dose steroids. In sinus now. Occasional PVCs    Review of Systems:   Review of Systems   Constitutional: Negative for activity change, appetite change, diaphoresis, fatigue and unexpected weight change. HENT: Negative for facial swelling, nosebleeds, trouble swallowing and voice change. Respiratory: Negative for apnea, chest tightness, shortness of breath and wheezing. Cardiovascular: Negative for chest pain, palpitations and leg swelling. Gastrointestinal: Negative for abdominal distention, anal bleeding, blood in stool, nausea and vomiting. Genitourinary: Negative for decreased urine volume and dysuria. Musculoskeletal: Negative for gait problem and myalgias. Skin: Negative for color change, pallor, rash and wound. Neurological: Negative for dizziness, syncope, facial asymmetry, weakness, light-headedness, numbness and headaches. Hematological: Does not bruise/bleed easily. Psychiatric/Behavioral: Negative for agitation, behavioral problems, confusion, hallucinations and suicidal ideas. The patient is not nervous/anxious. All other systems reviewed and are negative.         Physical Examination:    /70   Pulse 93   Temp 97.5 °F (36.4 °C) (Oral)   Resp 18   Ht 5' 10\" (1.778 m)   Wt 194 lb 0.1 oz (88 kg)   SpO2 96%   BMI 27.84 kg/m²    Physical Exam    LABS:  CBC:   Lab Results [facility] [I21.4] 12/02/2020    Anginal equivalent [facility] [I20.8] 12/01/2020    Acute chest pain [R07.9] 12/01/2020           Plan:  1. Anticoagulation on hold because INR is still more than 4  2. Continue other cardiac medications.   Electronically signed by Megan Mas MD on 12/10/2020 at 2:28 PM

## 2020-12-11 NOTE — PROGRESS NOTES
[place] [facility] [place] and transport. Family (Dr. Nic Shipley) has patient's belongings per request of the patients wife.  Electronically signed by Deb Tolentino RN on 12/11/2020 at 11:21 AM

## 2020-12-11 NOTE — SIGNIFICANT EVENT
CODE BLUE note    Initially rapid response was called because patient was in agonal breathing. He lost his pulse before the team arrived and CODE BLUE was called. Immediately CPR was initiated and patient given epinephrine. He was intubated and after multiple rounds of CPR amps of bicarb, patient had no response. He continued agonal rhythm and no pulse. ABG was done and showed hypoxia, severe acidosis, hypoglycemia, hyperkalemia. He was given calcium chloride, insulin, and multiple amps of bicarb. Patient did not achieve ROSC. His wife was contacted and asked to come to the hospital.  Surjit Souza device was placed for continued CPR until the wife arrived. CPR was terminated after the wife agreed. Patient did not have a pulse. He was pronounced dead at 06:35 AM on 12/11/2020. I spent 40 minutes of critical care time running the code.     Electronically signed by Magali Maldonado MD on 12/11/2020 at 7:10 AM

## 2020-12-11 NOTE — FLOWSHEET NOTE
Patient incontinent of stool. Patient up to bedside commode with assist. Patient back to bed. Respiratory notified for breathing treatment. Patient unresponsive at . Code blue called. CPR initiated.

## 2020-12-11 NOTE — DISCHARGE SUMMARY
Cardiology Discharge Summary      Patient Identification:  Meaghan Jones  : 1950  MRN: [id number]   Account: [de-identified]     Admit date: 2020  Discharge date: 21  Attending provider: No att. providers found        Primary care provider: Gaurav Pathak MD     Admission Diagnoses:  Chest pain. Febrile undetermined etiology      Discharge Diagnoses: Active Hospital Problems    Diagnosis Date Noted    NSTEMI (non-ST elevated myocardial infarction) ([facility] [address] [I21.4] 2020    Anginal equivalent ([facility] [address] [I20.8] 2020    Acute chest pain [R07.9] 2020          Hospital Course:   Meaghan Jones is a79 y.o. male admitted to [facility] on 2020 for chest pain. He recently had angioplasty stent. Patient underwent cardiac cath which showed patent stent. Multiple medical issues including her more than 25-year history of rheumatoid arthritis being followed at the [place] [facility] by Ap Zhang. He did not spike temperatures. ID was consulted pulmonary was consulted. He was started on antibiotics. He was negative for Covid. Patient has a longstanding history of anemia chronic anticoagulation. Because of his worsening respiratory status, he was then transferred to [facility] for intensive steroid therapy. Eduardo Vazquez He did respond clinically and symptomatically. Never spiked a fever. Was brought back to telemetry floor. On the night of 1210 he got short of breath respiratory arrest could not be resuscitated.   Please refer to CODE BLUE notes          Procedures:   NA     Consults:   ID  Pulmonary    Examination:  /70   Pulse 93   Temp 97.5 °F (36.4 °C) (Oral)   Resp 18   Ht 5' 10\" (1.778 m)   Wt 194 lb 0.1 oz (88 kg)   SpO2 91%   BMI 27.84 kg/m²    Physical Exam    Medications:  Discharge Medication List as of 2020 11:21 AM      CONTINUE these medications which have NOT CHANGED    Details   baclofen (LIORESAL) 10 MG tablet Take 10 mg by mouth 3 times dailyHistorical Med      ferrous sulfate (IRON 325) 325 (65 Fe) MG tablet Take 325 mg by mouth daily (with breakfast)Historical Med      leflunomide (ARAVA) 20 MG tablet Take 20 mg by mouth dailyHistorical Med      pregabalin (LYRICA) 75 MG capsule Take 75 mg by mouth 2 times daily. Historical Med      Tofacitinib Citrate (XELJANZ PO) Take 11 mg by mouth dailyHistorical Med      atorvastatin (LIPITOR) 80 MG tablet Take 1 tablet by mouth daily, Disp-30 tablet,R-3Normal      metoprolol tartrate (LOPRESSOR) 50 MG tablet Take 1.5 tablets by mouth 2 times daily, Disp-135 tablet,R-3Normal      warfarin (COUMADIN) 4 MG tablet Take 4 mg by mouth 4mg tuesdays and fridays  2mg all other daysHistorical Med      Cyanocobalamin (B-12 PO) Take by mouthHistorical Med      ezetimibe (ZETIA) 10 MG tablet Take 10 mg by mouth dailyHistorical Med      clopidogrel (PLAVIX) 75 MG tablet Take 75 mg by mouth dailyHistorical Med      esomeprazole Magnesium (NEXIUM) 40 MG PACK Take 40 mg by mouth dailyHistorical Med      folic acid (FOLVITE) 1 MG tablet Take 1 mg by mouth dailyHistorical Med      lisinopril (PRINIVIL;ZESTRIL) 10 MG tablet Take 10 mg by mouth dailyHistorical Med      predniSONE (DELTASONE) 5 MG tablet Take 5 mg by mouth dailyHistorical Med      VITAMIN D, CHOLECALCIFEROL, PO Take 4,000 Int'l Units by mouth daily Historical Med      gabapentin (NEURONTIN) 100 MG capsule Take 1 capsule by mouth 3 times daily for 30 days. , Disp-90 capsule, R-0Normal      Multiple Vitamins-Minerals (MULTIVITAMIN ADULT PO) Take by mouthHistorical Med      methotrexate (RHEUMATREX) 2.5 MG chemo tablet TAKE 10 TABLETS ONCE A WEEK WITH FOOD - NO ALCOHOL AND HOLD IF ON ANTIBIOTICS OR IF ILLHistorical Med      Coenzyme Q10 (COQ-10) 50 MG CAPS Take 50 mg by mouth 2 times dailyHistorical Med             Significant Diagnostics:   Radiology: Cta Chest W Wo Contrast    Result Date: 12/1/2020  EXAM: CT SCAN OF THE THORAX WITH CONTRAST/PE PROTOCOL/CTA CHEST COMPARISON: CHEST RADIOGRAPHS, DECEMBER 1, 2020, APRIL 2, 2019. REASON FOR EXAMINATION:  SHORTNESS OF BREATH FOR SEVERAL WEEKS TECHNIQUE: Helical CTA was performed through the chest utilizing 100 mL of Isovue 300 intravenous contrast medium. .  Images were obtained with bolus tracking in order to opacify the pulmonary arteries. Thick section coronal MIP 3D reconstructions were performed on a separate workstation. FINDINGS:  No intraluminal filling defects, pulmonary arterial tree. Thoracic aorta normal in course and caliber. Cardiac size normal. No pericardial effusion. Coronary calcification identified. No hilar, mediastinal, or axillary lymph node enlargement. Limited imaging upper abdomen shows small hiatal hernia. Adrenal glands without anomaly. Right lung shows emphysematous change, with fibrous scarring at right lung apex. Dependent subsegmental atelectatic change, right upper lobe, with mosaic morphology identified. Dependent subsegmental atelectatic change right lower lobe. Focal area of bullous change right lung base. No nodules, masses, pleural effusion. Left lung shows emphysematous change with fibrous scarring at left lung apex. Dependent subsegmental atelectatic change, left lung base. No osteoblastic, no osteolytic lesions. No CT evidence pulmonary embolism. Emphysema. Dependent subsegmental atelectatic changes discussed, right greater than left, with corresponding signs of air trapping, greatest in right lung. All CT scans at this facility use dose modulation, iterative reconstruction, and/or weight based dosing when appropriate to reduce radiation dose to as low as reasonably achievable. Xr Chest Portable    Result Date: 12/1/2020  EXAMINATION: XR CHEST PORTABLE.  DATE AND TIME:12/1/2020 12:12 PM CLINICAL HISTORY: SHORTNESS OF BREATH   SOB  COMPARISONS: FEBRUARY 27, 2019  FINDINGS: Patchy area of increased opacity in the right midlung zone suspicious  for small infiltrate. A Chest x-ray is insensitive in mild or early COVID-19  infection. Furthermore the specificity of chest x-ray findings for COVID-19 pneumonia is not established. Remaining lung fields clear. Heart and mediastinum within normal limits. SUSPECT RIGHT UPPER LUNG ZONE INFILTRATE.        Labs:   Recent Results (from the past 72 hour(s))   DEBBIE SCREEN WITH REFLEX    Collection Time: 12/08/20 12:57 PM   Result Value Ref Range    DEBBIE Ab, IgG SANTA None Detected None Detected   MPO/MS-3(ANCA) ABS    Collection Time: 12/08/20 12:57 PM   Result Value Ref Range    Myeloperoxidase Ab 0 0 - 19 AU/mL    Serine Protease 3 Ab 1 0 - 19 AU/mL   CBC Auto Differential    Collection Time: 12/08/20 12:57 PM   Result Value Ref Range    WBC 7.2 4.8 - 10.8 K/uL    RBC 2.77 (L) 4.70 - 6.10 M/uL    Hemoglobin 7.2 (L) 14.0 - 18.0 g/dL    Hematocrit 22.9 (L) 42.0 - 52.0 %    MCV 82.8 80.0 - 100.0 fL    MCH 26.1 (L) 27.0 - 31.3 pg    MCHC 31.6 (L) 33.0 - 37.0 %    RDW 31.5 (H) 11.5 - 14.5 %    Platelets 660 610 - 915 K/uL    PLATELET SLIDE REVIEW Normal     Neutrophils % 93.6 %    Lymphocytes % 3.5 %    Monocytes % 2.4 %    Eosinophils % 0.2 %    Basophils % 0.3 %    Neutrophils Absolute 6.8 (H) 1.4 - 6.5 K/uL    Lymphocytes Absolute 0.3 (L) 1.0 - 4.8 K/uL    Monocytes Absolute 0.2 0.2 - 0.8 K/uL    Eosinophils Absolute 0.0 0.0 - 0.7 K/uL    Basophils Absolute 0.0 0.0 - 0.2 K/uL    Anisocytosis 3+     Macrocytes 1+     Microcytes 1+     Polychromasia 1+     Hypochromia 1+     Poikilocytes 2+     Schistocytes 0     Acanthocytes 1+     Lien Cells 1+     Ovalocytes 1+    Magnesium    Collection Time: 12/08/20  6:56 PM   Result Value Ref Range    Magnesium 2.1 1.7 - 2.4 mg/dL   CBC    Collection Time: 12/09/20  5:13 AM   Result Value Ref Range    WBC 6.4 4.8 - 10.8 K/uL    RBC 2.91 (L) 4.70 - 6.10 M/uL    Hemoglobin 7.6 (L) 14.0 - 18.0 g/dL    Hematocrit 24.0 (L) 42.0 - 52.0 %    MCV 82.3 80.0 - 100.0 fL    MCH POC Glucose 460 (HH) 60 - 115 mg/dl    POC Creatinine 2.0 (H) 0.8 - 1.3 mg/dL    GFR Non- 33 (A) >60    GFR  40 (A) >60    Calcium, Ion 1.26 1.12 - 1.32 mmol/L    pH, Arterial 6.848 (LL) 7.350 - 7.450    pCO2, Arterial 106 (HH) 35 - 45 mm Hg    pO2, Arterial 41 (HH) 75 - 108 mm Hg    HCO3, Arterial 18.4 (L) 21.0 - 29.0 mmol/L    Base Excess, Arterial -15 (L) -3 - 3    O2 Sat, Arterial 39 (LL) 93 - 100 %    TCO2, Arterial 22 22 - 29    Lactate 18.33 (HH) 0.40 - 2.00 mmol/L    POC Hematocrit 19 (LL) 41 - 53 %    Hemoglobin 6.6 (LL) 13.5 - 17.5 gm/dL    FIO2 100.000     Sample Type ART     Performed on SEE BELOW              Follow-up visits:   Kerline Navarro MD  [address] [place] [address]  [phone]    On 2020  at 11am with ANNY Perry MD  [address] [facility]  [phone]    In 1 week  tremor    Yvonne Hutton MD  [address] [facility] [address]  [phone]    Schedule an appointment as soon as possible for a visit in 4 weeks      Mike Muñiz MD  [facility] [address] [place] [address]  [phone]             Assessment:  [address] Problems    Diagnosis Date Noted    NSTEMI (non-ST elevated myocardial infarction) ([address] [I21.4] 2020    Anginal equivalent ([address] [I20.8] 2020    Acute chest pain [R07.9] 2020         Plan:   1.   Patient         Electronically signed by Bhumi Gutierres MDon 2020 at 11:35 AM

## 2020-12-11 NOTE — SIGNIFICANT EVENT
Called to bedside to pronounce death after patient was noted to be in asystole at 6:35am on 12/11/2020. NO pupillary, corneal, doll's eye or gag reflex noted. NO heart sounds or pulse noted. NO Chest rise or Lung sounds noted.    NO Response to painful stimuli  Time of death declared at 2:26ZW.    Death certificate will be signed by Dr. Ally Butler MD    12/11/2020  7:11 AM

## 2020-12-11 NOTE — FLOWSHEET NOTE
5867: Code blue called. CPR initiated. 0600: Epi 1 mg given    0603: Epi 1 mg gven    0606: Airway placed by respiratory    0607:  Bicarb 1 amp given    0608: Epi 1 mg given    0612: Epi 1 mg given    0615: Bicarb 1 amp given    0617: Blood gases drawn    0618: Epi 1 mg given    0620: 10 units of insulin given    0621: Bicarb 1 amp given    0622: Epi 1 mg given    0623: Bicarb 1 amp given    0625: Bicarb 1 amp given    0627: Bicarb 1 amp given    0628: Epi 1 mg given    6669: Time of death called

## 2025-06-06 NOTE — PROGRESS NOTES
Called Pt to relay message below   Patient verbalized understanding and had no further questions at this time.     Report called to Vito Rincon RN in pre/post cath lab.

## (undated) DEVICE — TUBE SET 96 MM 64 MM H2O PERISTALTIC STD AUX CHANNEL

## (undated) DEVICE — GLOVE SURG SZ 85 STD WHT LTX SYN POLYMER BEAD REINF ANTI RL

## (undated) DEVICE — INSTINCT ENDOSCOPIC HEMOCLIP: Brand: INSTINCT

## (undated) DEVICE — ADAPTER FLSH PMP FLD MGMT GI IRRIG OFP 2 DISPOSABLE

## (undated) DEVICE — CATHETER SCLERO L240CM NDL 25GA L4MM SHTH DIA2.3MM CNTRST

## (undated) DEVICE — Device: Brand: ENDO SMARTCAP

## (undated) DEVICE — SONY PRINTER PAPER

## (undated) DEVICE — BRUSH ENDO CLN L90.5IN SHTH DIA1.7MM BRIST DIA5-7MM 2-6MM

## (undated) DEVICE — CONMED SCOPE SAVER BITE BLOCK, 20X27 MM: Brand: SCOPE SAVER

## (undated) DEVICE — ENDO CARRY-ON PROCEDURE KIT: Brand: ENDO CARRY-ON PROCEDURE KIT

## (undated) DEVICE — 1200CC COLLECTION UNIT 6MM X 6' PATIENT: Brand: MEDI-VAC